# Patient Record
Sex: MALE | Race: WHITE | NOT HISPANIC OR LATINO | Employment: OTHER | URBAN - METROPOLITAN AREA
[De-identification: names, ages, dates, MRNs, and addresses within clinical notes are randomized per-mention and may not be internally consistent; named-entity substitution may affect disease eponyms.]

---

## 2017-12-13 ENCOUNTER — TRANSFERRED RECORDS (OUTPATIENT)
Dept: HEALTH INFORMATION MANAGEMENT | Facility: CLINIC | Age: 62
End: 2017-12-13

## 2017-12-13 LAB
CREAT SERPL-MCNC: 0.86 MG/DL (ref 0.7–1.2)
GFR SERPL CREATININE-BSD FRML MDRD: >60 ML/MIN/1.73M2
POTASSIUM SERPL-SCNC: 4.4 MEQ/L (ref 3.4–5.1)

## 2018-08-17 ENCOUNTER — TRANSFERRED RECORDS (OUTPATIENT)
Dept: HEALTH INFORMATION MANAGEMENT | Facility: CLINIC | Age: 63
End: 2018-08-17

## 2018-09-04 ENCOUNTER — OFFICE VISIT (OUTPATIENT)
Dept: SURGERY | Facility: OTHER | Age: 63
End: 2018-09-04
Attending: SURGERY
Payer: COMMERCIAL

## 2018-09-04 VITALS
BODY MASS INDEX: 34.21 KG/M2 | HEART RATE: 75 BPM | OXYGEN SATURATION: 96 % | SYSTOLIC BLOOD PRESSURE: 140 MMHG | HEIGHT: 67 IN | RESPIRATION RATE: 16 BRPM | DIASTOLIC BLOOD PRESSURE: 86 MMHG | WEIGHT: 218 LBS | TEMPERATURE: 98.5 F

## 2018-09-04 DIAGNOSIS — Z12.11 SPECIAL SCREENING FOR MALIGNANT NEOPLASMS, COLON: Primary | ICD-10-CM

## 2018-09-04 DIAGNOSIS — Z86.0100 HISTORY OF COLONIC POLYPS: ICD-10-CM

## 2018-09-04 DIAGNOSIS — Z01.818 ENCOUNTER FOR PREOPERATIVE EXAMINATION FOR GENERAL SURGICAL PROCEDURE: ICD-10-CM

## 2018-09-04 PROCEDURE — 93000 ELECTROCARDIOGRAM COMPLETE: CPT | Performed by: INTERNAL MEDICINE

## 2018-09-04 PROCEDURE — 99203 OFFICE O/P NEW LOW 30 MIN: CPT | Performed by: SURGERY

## 2018-09-04 RX ORDER — BISACODYL 5 MG/1
TABLET, DELAYED RELEASE ORAL
Qty: 2 TABLET | Refills: 0 | Status: SHIPPED | OUTPATIENT
Start: 2018-09-04 | End: 2019-04-26

## 2018-09-04 RX ORDER — POLYETHYLENE GLYCOL 3350 17 G/17G
POWDER, FOR SOLUTION ORAL
Qty: 1 BOTTLE | Refills: 0 | Status: SHIPPED | OUTPATIENT
Start: 2018-09-04 | End: 2019-04-26

## 2018-09-04 ASSESSMENT — PAIN SCALES - GENERAL: PAINLEVEL: NO PAIN (0)

## 2018-09-04 NOTE — MR AVS SNAPSHOT
After Visit Summary   9/4/2018    Maxx Canales    MRN: [id number]           Patient Information     Date Of Birth          1955        Visit Information        Provider Department      9/4/2018 9:00 AM Mj Parsons MD [facility]        Today's Diagnoses     Special screening for malignant neoplasms, colon    -  1      Care Instructions    Thank you for allowing Dr. Parsons and our surgical team to participate in your care.  If you have a scheduling or an appointment question please contact Sofya [facility] Health Unit Coordinator at her direct line [phone].   For nursing questions call L[facility] at [phone]      You are scheduled for a: colonoscopy  Your procedure date is: 10/4/18    You need a friend or family member available to drive you home AND stay with you for 24 hours after you leave the hospital. You will not be allowed to drive yourself. IF you need to take a taxi or the bus you MUST have a responsible person to ride with you. YOUR PROCEDURE WILL BE CANCELLED IF YOU DO NOT HAVE A RESPONSIBLE ADULT TO DRIVE YOU HOME.              You need to call our Surgery Education Nurses 1-2 weeks prior to your surgery date at  [phone] or toll free [phone]. Please have you medication and allergy lists ready.      Stop your aspirin or other NSAIDs(Ibuprofen, Motrin, Aleve, Celebrex, Naproxen, etc...) 7 days before your surgery.      Hospital admitting will call you the day before your surgery with your arrival time. If you are scheduled on a Monday admitting will call you the Friday before.      Please call your primary care physician if you should become ill within 24 hours of scheduled surgery. (ex.vomiting, diarrhea, fever)    Surgery Education will contact you the day before your procedure between the hours of noon and 5 pm with the time you need to register in admitting at the hospital. Call Ivette with any questions [phone]    Day of surgery hold all  "medications until you arrive home you may resume all medications like normal.            Follow-ups after your visit        Who to contact     If you have questions or need follow up information about today's clinic visit or your schedule please contact [facility] directly at [phone].  Normal or non-critical lab and imaging results will be communicated to you by MyChart, letter or phone within 4 business days after the clinic has received the results. If you do not hear from us within 7 days, please contact the clinic through MyChart or phone. If you have a critical or abnormal lab result, we will notify you by phone as soon as possible.  Submit refill requests through [employer] or call your pharmacy and they will forward the refill request to us. Please allow 3 business days for your refill to be completed.          Additional Information About Your Visit        Care EveryWhere ID     This is your Care EveryWhere ID. This could be used by other organizations to access your [facility] medical records  [id number]        Your Vitals Were     Pulse Temperature Respirations Height Pulse Oximetry BMI (Body Mass Index)    75 98.5  F (36.9  C) (Tympanic) 16 5' 6.5\" (1.689 m) 96% 34.66 kg/m2       Blood Pressure from Last 3 Encounters:   09/04/18 140/86   10/14/15 139/97   10/21/13 103/68    Weight from Last 3 Encounters:   09/04/18 218 lb (98.9 kg)   10/16/13 205 lb (93 kg)   10/04/13 205 lb (93 kg)              Today, you had the following     No orders found for display         Today's Medication Changes          These changes are accurate as of 9/4/18  9:13 AM.  If you have any questions, ask your nurse or doctor.               Stop taking these medicines if you haven't already. Please contact your care team if you have questions.     oxyCODONE-acetaminophen 5-325 MG per tablet   Commonly known as:  PERCOCET   Stopped by:  Mj Parsons MD           vitamin C 500 MG Chew   Stopped by:  Mj Parsons " MD ROMANA                    Primary Care Provider Office Phone # Fax #    Kaila Javier [phone]       [address] [address]        Equal Access to Services     ANDRAALEX CEDRIC : Karen fajardo kaitlin Sojanie, waaxda luqadaha, qaybta kaalmada adewallaceyada, teresita allred laAnambrina devine. So [facility] [phone].    ATENCIÓN: Si habla español, tiene a aiken disposición servicios gratuitos de asistencia lingüística. Llame al [phone].    We comply with applicable federal civil rights laws and [place] laws. We do not discriminate on the basis of race, color, national origin, age, disability, sex, sexual orientation, or gender identity.            Thank you!     Thank you for choosing [facility]  for your care. Our goal is always to provide you with excellent care. Hearing back from our patients is one way we can continue to improve our services. Please take a few minutes to complete the written survey that you may receive in the mail after your visit with us. Thank you!             Your Updated Medication List - Protect others around you: Learn how to safely use, store and throw away your medicines at www.disposemymeds.org.          This list is accurate as of 9/4/18  9:13 AM.  Always use your most recent med list.                   Brand Name Dispense Instructions for use Diagnosis    ASPIRIN PO      Take 81 mg by mouth daily        lisinopril 20 MG tablet    PRINIVIL/ZESTRIL     Take 20 mg by mouth daily        MULTIVITAMIN PO           Vitamin D-3 1000 units Caps           ZOCOR 80 MG tablet   Generic drug:  simvastatin      Take by mouth At Bedtime

## 2018-09-04 NOTE — PROGRESS NOTES
Surgery Consult Clinic Note      RE: Maxx Canales  : 1955  DAYA: 2018      Chief Complaint:  Colon Cancer Screening   History of colon polyps     History of Present Illness:  Maxx Canales is a very pleasant 62 year old year old male who I am seeing at the request of Dr. Javier for evaluation of screening colon malignant neoplasm and consideration for colonoscopy.  He denies family history of colon or rectal cancer, blood in stool, changes in bowel habits, weight loss, abdominal pain.  Surgical hx: sigmoid colectomy.  He specifically denies fever, chills, nausea, vomiting, chest pain, shortness of breath or palpitations.      Medical history:  History reviewed. No pertinent past medical history.    Surgical history:  Past Surgical History:   Procedure Laterality Date     ABDOMEN SURGERY      sigmoid resection, diverticulitis     COLONOSCOPY  10/21/2013    Procedure: COLONOSCOPY;  COLONOSCOPY with biopsy/ polypectomy;  Surgeon: Carisa Brito DO;  Location: [facility] OR     EYE SURGERY         Family history:  Family History   Problem Relation Age of Onset     Diabetes Mother      HEART DISEASE Father        Medications:  Current Outpatient Prescriptions   Medication Sig Dispense Refill     ASPIRIN PO Take 81 mg by mouth daily       Cholecalciferol (VITAMIN D-3) 1000 UNITS CAPS        lisinopril (PRINIVIL,ZESTRIL) 20 MG tablet Take 20 mg by mouth daily       Multiple Vitamins-Minerals (MULTIVITAMIN OR)        simvastatin (ZOCOR) 80 MG tablet Take by mouth At Bedtime       Allergies:  The patientis allergic to atorvastatin.  .  Social history:  Social History   Substance Use Topics     Smoking status: Former Smoker     Types: Cigarettes     Quit date: 1999     Smokeless tobacco: Former User     Types: Chew      Comment: occasional cigar     Alcohol use Yes      Comment: 3x week     Marital status: .      Review of Systems:  10 point review of systems was obtained and negative other  "than what previously mentioned in HPI    Physical Examination:  /86 (BP Location: Right arm, Patient Position: Sitting, Cuff Size: Adult Regular)  Pulse 75  Temp 98.5  F (36.9  C) (Tympanic)  Resp 16  Ht 5' 6.5\" (1.689 m)  Wt 218 lb (98.9 kg)  SpO2 96%  BMI 34.66 kg/m2  General: AAOx4, NAD, WN/WD, ambulating without assistance  HEENT:NCAT, EOMI, PERRL Sclerae anicteric; Trachea mideline,   Chest:  No acute distress, CTAB   Cardiac:  regular rate and rhythm, no murmur   Abdomen: Non-distended   Extremities: Cursory exam unremarkable.  Skin: Warm, dry,   Neuro: no focal deficit,   Psych: happy, calm, asks appropriate questions      Assessment/Plan:  62 y.o. Female with history of sigmoid colectomy for diverticulitis presents for colon cancer screening. He has a history of an adenoma 5 years ago. Satisfactory candidate for colonoscopy.  The indications, risks, benefits and technical aspects of whole colon colonoscopy were outlined with risks including, but not limited to, perforation, bleeding and inability to visualize entire colon.  Management of each was reviewed.  The need of mechanical preparation of the colon was reviewed along with the use of monitored anesthetic care.  The patient's questions were asked and answered.      Mj Parsons      "

## 2018-09-04 NOTE — NURSING NOTE
"Chief Complaint   Patient presents with     Consult     referred by Sonam for colonoscopy, previously 5 years ago with polyps, no changes in boels and no family history of colon cancer       Initial /86 (BP Location: Right arm, Patient Position: Sitting, Cuff Size: Adult Regular)  Pulse 75  Temp 98.5  F (36.9  C) (Tympanic)  Resp 16  Ht 5' 6.5\" (1.689 m)  Wt 218 lb (98.9 kg)  SpO2 96%  BMI 34.66 kg/m2 Estimated body mass index is 34.66 kg/(m^2) as calculated from the following:    Height as of this encounter: 5' 6.5\" (1.689 m).    Weight as of this encounter: 218 lb (98.9 kg).  Medication Reconciliation: complete    Alisosn Hernandez LPN    "

## 2018-09-04 NOTE — PATIENT INSTRUCTIONS
Thank you for allowing Dr. Parsons and our surgical team to participate in your care.  If you have a scheduling or an appointment question please contact Sofya our Health Unit Coordinator at her direct line [phone].   For nursing questions call Loida at [phone]      You are scheduled for a: colonoscopy  Your procedure date is: 10/4/18    You need a friend or family member available to drive you home AND stay with you for 24 hours after you leave the hospital. You will not be allowed to drive yourself. IF you need to take a taxi or the bus you MUST have a responsible person to ride with you. YOUR PROCEDURE WILL BE CANCELLED IF YOU DO NOT HAVE A RESPONSIBLE ADULT TO DRIVE YOU HOME.              You need to call our Surgery Education Nurses 1-2 weeks prior to your surgery date at  [phone] or toll free [phone]. Please have you medication and allergy lists ready.      Stop your aspirin or other NSAIDs(Ibuprofen, Motrin, Aleve, Celebrex, Naproxen, etc...) 7 days before your surgery.      Hospital admitting will call you the day before your surgery with your arrival time. If you are scheduled on a Monday admitting will call you the Friday before.      Please call your primary care physician if you should become ill within 24 hours of scheduled surgery. (ex.vomiting, diarrhea, fever)    Surgery Education will contact you the day before your procedure between the hours of noon and 5 pm with the time you need to register in admitting at the hospital. Call Ivette with any questions [phone]    Day of surgery hold all medications until you arrive home you may resume all medications like normal.

## 2018-10-02 ENCOUNTER — TRANSFERRED RECORDS (OUTPATIENT)
Dept: HEALTH INFORMATION MANAGEMENT | Facility: CLINIC | Age: 63
End: 2018-10-02

## 2018-10-02 LAB
ALT SERPL-CCNC: 24 IU/L (ref 6–40)
CHOLEST SERPL-MCNC: 197 MG/DL (ref 114–200)
CREAT SERPL-MCNC: 0.83 MG/DL (ref 0.7–1.2)
GFR SERPL CREATININE-BSD FRML MDRD: >60 ML/MIN/1.73M2
GLUCOSE SERPL-MCNC: 108 MG/DL (ref 70–99)
HDLC SERPL-MCNC: 44 MG/DL (ref 40–60)
LDLC SERPL CALC-MCNC: 112 MG/DL
POTASSIUM SERPL-SCNC: 4 MEQ/L (ref 3.4–5.1)
TRIGL SERPL-MCNC: 205 MG/DL (ref 10–200)

## 2018-10-03 ENCOUNTER — ANESTHESIA EVENT (OUTPATIENT)
Dept: SURGERY | Facility: HOSPITAL | Age: 63
End: 2018-10-03
Payer: COMMERCIAL

## 2018-10-03 ASSESSMENT — LIFESTYLE VARIABLES: TOBACCO_USE: 1

## 2018-10-03 NOTE — ANESTHESIA PREPROCEDURE EVALUATION
Anesthesia Evaluation     . Pt has had prior anesthetic.     No history of anesthetic complications          ROS/MED HX    ENT/Pulmonary:     (+)PO risk factors obese, tobacco use, Past use quit 1999 packs/day  , . .    Neurologic:  - neg neurologic ROS     Cardiovascular:     (+) Dyslipidemia, ----. : . . . :. .       METS/Exercise Tolerance:     Hematologic:  - neg hematologic  ROS       Musculoskeletal:  - neg musculoskeletal ROS       GI/Hepatic:     (+) bowel prep, Other GI/Hepatic Diverticular Disease s/p Sigmoid Colectomy 1997      Renal/Genitourinary:  - ROS Renal section negative       Endo:     (+) Obesity, .      Psychiatric:  - neg psychiatric ROS       Infectious Disease:  - neg infectious disease ROS       Malignancy:      - no malignancy   Other:    - neg other ROS                 Physical Exam  Normal systems: dental    Airway   Mallampati: III  TM distance: >3 FB  Neck ROM: full    Dental     Cardiovascular   Rhythm and rate: regular and normal      Pulmonary    breath sounds clear to auscultation                    Anesthesia Plan      History & Physical Review  History and physical reviewed and following examination; no interval change.    ASA Status:  3 .    NPO Status:  > 8 hours    Plan for MAC with Propofol and Intravenous induction. Maintenance will be TIVA.  Reason for MAC:  Chronic cardiopulmonary disease (G9) and Other - see comments  PONV prophylaxis:  Ondansetron (or other 5HT-3)  Surgeon requests deep sedation. Patient is an ASA 3. Will provide MAC.      Postoperative Care  Postoperative pain management:  IV analgesics.      Consents  Anesthetic plan, risks, benefits and alternatives discussed with:  Patient..                          .

## 2018-10-04 ENCOUNTER — HOSPITAL ENCOUNTER (OUTPATIENT)
Facility: HOSPITAL | Age: 63
Discharge: HOME | End: 2018-10-04
Attending: SURGERY | Admitting: SURGERY
Payer: COMMERCIAL

## 2018-10-04 ENCOUNTER — SURGERY (OUTPATIENT)
Age: 63
End: 2018-10-04

## 2018-10-04 ENCOUNTER — ANESTHESIA (OUTPATIENT)
Dept: SURGERY | Facility: HOSPITAL | Age: 63
End: 2018-10-04
Payer: COMMERCIAL

## 2018-10-04 VITALS
RESPIRATION RATE: 18 BRPM | HEIGHT: 67 IN | SYSTOLIC BLOOD PRESSURE: 106 MMHG | WEIGHT: 213.3 LBS | DIASTOLIC BLOOD PRESSURE: 66 MMHG | TEMPERATURE: 98.2 F | OXYGEN SATURATION: 95 % | BODY MASS INDEX: 33.48 KG/M2

## 2018-10-04 PROCEDURE — 25000125 ZZHC RX 250: Performed by: NURSE ANESTHETIST, CERTIFIED REGISTERED

## 2018-10-04 PROCEDURE — 71000027 ZZH RECOVERY PHASE 2 EACH 15 MINS: Performed by: SURGERY

## 2018-10-04 PROCEDURE — 37000008 ZZH ANESTHESIA TECHNICAL FEE, 1ST 30 MIN: Performed by: SURGERY

## 2018-10-04 PROCEDURE — 25000128 H RX IP 250 OP 636: Performed by: NURSE ANESTHETIST, CERTIFIED REGISTERED

## 2018-10-04 PROCEDURE — 36000050 ZZH SURGERY LEVEL 2 1ST 30 MIN: Performed by: SURGERY

## 2018-10-04 PROCEDURE — 27210794 ZZH OR GENERAL SUPPLY STERILE: Performed by: SURGERY

## 2018-10-04 PROCEDURE — 25000128 H RX IP 250 OP 636: Performed by: ANESTHESIOLOGY

## 2018-10-04 PROCEDURE — 45385 COLONOSCOPY W/LESION REMOVAL: CPT | Performed by: ANESTHESIOLOGY

## 2018-10-04 PROCEDURE — 88305 TISSUE EXAM BY PATHOLOGIST: CPT | Mod: TC | Performed by: SURGERY

## 2018-10-04 PROCEDURE — 40000306 ZZH STATISTIC PRE PROC ASSESS II: Performed by: SURGERY

## 2018-10-04 PROCEDURE — 45380 COLONOSCOPY AND BIOPSY: CPT | Mod: PT | Performed by: SURGERY

## 2018-10-04 PROCEDURE — 01999 UNLISTED ANES PROCEDURE: CPT | Performed by: NURSE ANESTHETIST, CERTIFIED REGISTERED

## 2018-10-04 RX ORDER — FENTANYL CITRATE 50 UG/ML
25-50 INJECTION, SOLUTION INTRAMUSCULAR; INTRAVENOUS
Status: DISCONTINUED | OUTPATIENT
Start: 2018-10-04 | End: 2018-10-04 | Stop reason: HOSPADM

## 2018-10-04 RX ORDER — SODIUM CHLORIDE, SODIUM LACTATE, POTASSIUM CHLORIDE, CALCIUM CHLORIDE 600; 310; 30; 20 MG/100ML; MG/100ML; MG/100ML; MG/100ML
INJECTION, SOLUTION INTRAVENOUS CONTINUOUS
Status: DISCONTINUED | OUTPATIENT
Start: 2018-10-04 | End: 2018-10-04 | Stop reason: HOSPADM

## 2018-10-04 RX ORDER — PROPOFOL 10 MG/ML
INJECTION, EMULSION INTRAVENOUS PRN
Status: DISCONTINUED | OUTPATIENT
Start: 2018-10-04 | End: 2018-10-04

## 2018-10-04 RX ORDER — LIDOCAINE HYDROCHLORIDE 20 MG/ML
INJECTION, SOLUTION INFILTRATION; PERINEURAL PRN
Status: DISCONTINUED | OUTPATIENT
Start: 2018-10-04 | End: 2018-10-04

## 2018-10-04 RX ORDER — ONDANSETRON 4 MG/1
4 TABLET, ORALLY DISINTEGRATING ORAL EVERY 30 MIN PRN
Status: DISCONTINUED | OUTPATIENT
Start: 2018-10-04 | End: 2018-10-04 | Stop reason: HOSPADM

## 2018-10-04 RX ORDER — NALOXONE HYDROCHLORIDE 0.4 MG/ML
.1-.4 INJECTION, SOLUTION INTRAMUSCULAR; INTRAVENOUS; SUBCUTANEOUS
Status: DISCONTINUED | OUTPATIENT
Start: 2018-10-04 | End: 2018-10-04 | Stop reason: HOSPADM

## 2018-10-04 RX ORDER — MEPERIDINE HYDROCHLORIDE 50 MG/ML
12.5 INJECTION INTRAMUSCULAR; INTRAVENOUS; SUBCUTANEOUS
Status: DISCONTINUED | OUTPATIENT
Start: 2018-10-04 | End: 2018-10-04 | Stop reason: HOSPADM

## 2018-10-04 RX ORDER — ONDANSETRON 2 MG/ML
4 INJECTION INTRAMUSCULAR; INTRAVENOUS EVERY 30 MIN PRN
Status: DISCONTINUED | OUTPATIENT
Start: 2018-10-04 | End: 2018-10-04 | Stop reason: HOSPADM

## 2018-10-04 RX ORDER — HYDRALAZINE HYDROCHLORIDE 20 MG/ML
2.5-5 INJECTION INTRAMUSCULAR; INTRAVENOUS EVERY 10 MIN PRN
Status: DISCONTINUED | OUTPATIENT
Start: 2018-10-04 | End: 2018-10-04 | Stop reason: HOSPADM

## 2018-10-04 RX ORDER — DEXAMETHASONE SODIUM PHOSPHATE 4 MG/ML
4 INJECTION, SOLUTION INTRA-ARTICULAR; INTRALESIONAL; INTRAMUSCULAR; INTRAVENOUS; SOFT TISSUE EVERY 10 MIN PRN
Status: DISCONTINUED | OUTPATIENT
Start: 2018-10-04 | End: 2018-10-04 | Stop reason: HOSPADM

## 2018-10-04 RX ORDER — ALBUTEROL SULFATE 0.83 MG/ML
2.5 SOLUTION RESPIRATORY (INHALATION) EVERY 4 HOURS PRN
Status: DISCONTINUED | OUTPATIENT
Start: 2018-10-04 | End: 2018-10-04 | Stop reason: HOSPADM

## 2018-10-04 RX ORDER — LIDOCAINE 40 MG/G
CREAM TOPICAL
Status: DISCONTINUED | OUTPATIENT
Start: 2018-10-04 | End: 2018-10-04 | Stop reason: HOSPADM

## 2018-10-04 RX ORDER — HYDROMORPHONE HYDROCHLORIDE 1 MG/ML
.3-.5 INJECTION, SOLUTION INTRAMUSCULAR; INTRAVENOUS; SUBCUTANEOUS EVERY 10 MIN PRN
Status: DISCONTINUED | OUTPATIENT
Start: 2018-10-04 | End: 2018-10-04 | Stop reason: HOSPADM

## 2018-10-04 RX ORDER — FLUMAZENIL 0.1 MG/ML
0.2 INJECTION, SOLUTION INTRAVENOUS
Status: DISCONTINUED | OUTPATIENT
Start: 2018-10-04 | End: 2018-10-04 | Stop reason: HOSPADM

## 2018-10-04 RX ORDER — KETOROLAC TROMETHAMINE 30 MG/ML
30 INJECTION, SOLUTION INTRAMUSCULAR; INTRAVENOUS EVERY 6 HOURS PRN
Status: DISCONTINUED | OUTPATIENT
Start: 2018-10-04 | End: 2018-10-04 | Stop reason: HOSPADM

## 2018-10-04 RX ADMIN — PROPOFOL 50 MG: 10 INJECTION, EMULSION INTRAVENOUS at 11:08

## 2018-10-04 RX ADMIN — PROPOFOL 50 MG: 10 INJECTION, EMULSION INTRAVENOUS at 11:01

## 2018-10-04 RX ADMIN — PROPOFOL 20 MG: 10 INJECTION, EMULSION INTRAVENOUS at 11:14

## 2018-10-04 RX ADMIN — PROPOFOL 20 MG: 10 INJECTION, EMULSION INTRAVENOUS at 11:13

## 2018-10-04 RX ADMIN — LIDOCAINE HYDROCHLORIDE 40 MG: 20 INJECTION, SOLUTION INFILTRATION; PERINEURAL at 10:59

## 2018-10-04 RX ADMIN — PROPOFOL 20 MG: 10 INJECTION, EMULSION INTRAVENOUS at 11:11

## 2018-10-04 RX ADMIN — PROPOFOL 50 MG: 10 INJECTION, EMULSION INTRAVENOUS at 10:59

## 2018-10-04 RX ADMIN — SODIUM CHLORIDE, POTASSIUM CHLORIDE, SODIUM LACTATE AND CALCIUM CHLORIDE: 600; 310; 30; 20 INJECTION, SOLUTION INTRAVENOUS at 10:50

## 2018-10-04 RX ADMIN — PROPOFOL 50 MG: 10 INJECTION, EMULSION INTRAVENOUS at 11:04

## 2018-10-04 RX ADMIN — PROPOFOL 20 MG: 10 INJECTION, EMULSION INTRAVENOUS at 11:16

## 2018-10-04 NOTE — IP AVS SNAPSHOT
[facility] Preop/Phase II    [address] [address]    Phone:  [phone]                                       After Visit Summary   10/4/2018    Maxx Canales    MRN: [id number]           After Visit Summary Signature Page     I have received my discharge instructions, and my questions have been answered. I have discussed any challenges I see with this plan with the nurse or doctor.    ..........................................................................................................................................  Patient/Patient Representative Signature      ..........................................................................................................................................  Patient Representative Print Name and Relationship to Patient    ..................................................               ................................................  Date                                   Time    ..........................................................................................................................................  Reviewed by Signature/Title    ...................................................              ..............................................  Date                                               Time          22EPIC Rev 08/18

## 2018-10-04 NOTE — ANESTHESIA POSTPROCEDURE EVALUATION
Patient: Maxx Canales    Procedure(s):  COLONOSCOPY WITH POLYPECTOMY - Wound Class: IV-Dirty or Infected    Diagnosis:HX OF POLYPS  Diagnosis Additional Information: No value filed.    Anesthesia Type:  MAC    Note:  Anesthesia Post Evaluation    Patient location during evaluation: Phase 2 and Bedside  Patient participation: Able to fully participate in evaluation  Level of consciousness: awake and alert  Pain management: adequate  Airway patency: patent  Cardiovascular status: acceptable  Respiratory status: acceptable  Hydration status: stable  PONV: none     Anesthetic complications: None          Last vitals:  Vitals:    10/04/18 1135 10/04/18 1140 10/04/18 1145   BP:  108/68 106/66   Resp:  16 18   Temp:   98.2  F (36.8  C)   SpO2: 94% 94% 95%         Electronically Signed By: Michael Seay MD  October 4, 2018  1:32 PM

## 2018-10-04 NOTE — OR NURSING
Pt eating, drinking, and tolerating well. Passing gas. Denies pain. DIscharge instructions discussed with Pt and spouse. Requesting to leave, and doesn't want to wait to talk to surgeon. States he will wait for the phone call with the results. IV removed. Dressed independently, and escorted to SDS exit via ambulatory.

## 2018-10-04 NOTE — ANESTHESIA CARE TRANSFER NOTE
Patient: Maxx Canales    Procedure(s):  COLONOSCOPY WITH POLYPECTOMY - Wound Class: IV-Dirty or Infected    Diagnosis: HX OF POLYPS  Diagnosis Additional Information: No value filed.    Anesthesia Type:   MAC     Note:  Airway :Room Air  Patient transferred to:Phase II  Handoff Report: Identifed the Patient, Identified the Reponsible Provider, Reviewed the pertinent medical history, Discussed the surgical course, Reviewed Intra-OP anesthesia mangement and issues during anesthesia, Set expectations for post-procedure period and Allowed opportunity for questions and acknowledgement of understanding      Vitals: (Last set prior to Anesthesia Care Transfer)    CRNA VITALS  10/4/2018 1050 - 10/4/2018 1123      10/4/2018             Pulse: 78    Ht Rate: 78    SpO2: 98 %    Resp Rate (set): 8                Electronically Signed By: SEPIDEH Osman CRNA  October 4, 2018  11:23 AM

## 2018-10-04 NOTE — BRIEF OP NOTE
[facility] - Brief Operative Note                Pre-operative diagnosis:                     HX OF POLYPS   Post-operative diagnosis * No post-op diagnosis entered *   Procedure: Procedure(s):  COLONOSCOPY WITH POLYPECTOMY - Wound Class: IV-Dirty or Infected   Surgeon: Mj Parsons MD,    Anesthesia: Monitor Anesthesia Care    Estimated blood loss: * No values recorded between 10/4/2018 11:01 AM and 10/4/2018 11:21 AM *   Blood transfusion: No transfusion was given during surgery   Drains: None    Specimens:   ID Type Source Tests Collected by Time Destination   A : ASCENDING COLON POLYP x 2 Polyp Large Intestine, Right/Ascending SURGICAL PATHOLOGY EXAM Mj Parsons MD 10/4/2018 11:07 AM    B : rectum polyp x 2 Polyp Large Intestine, Rectum SURGICAL PATHOLOGY EXAM Mj Parsons MD 10/4/2018 11:14 AM       Findings: Sigmoid colectomy, ascending colon polyps x2, rectal polyps x 2    Complications: None   Condition: Stable   Comments: Details included in dictated operative note.

## 2018-10-04 NOTE — DISCHARGE INSTRUCTIONS
INSTRUCTIONS AFTER COLONOSCOPY    WHEN YOU ARE BACK HOME:    Plan to rest for an hour or two after you get home.    You may have some cramping or pressure until you pass gas.    You may resume your regular medications.    Eat a small, light meal at first, and then gradually return to normal meal sizes.  If you had a polyp removed:    Slight bleeding may occur.  You may have a slight blood stain on the toilet paper after a bowel movement.    To lessen the chance of bleeding, avoid heavy exercise for ONE WEEK.  This includes heavy lifting, vigorous sport activities, and heavy physical labor.  You may resume your normal sexual activity.      Avoid aspirin or aspirin products if instructed by your doctor.    WHAT TO WATCH FOR:  Problems rarely occur after the exam; however, it is important for you to watch for early signs of possible problems.  If you have     Unusual pain in your abdomen    Nausea and vomiting that persists    Excessive bleeding    Black or bloody bowel movements    Fever or temperature above 100.6 F  Please call your doctor ([facility] [phone]) or go to the nearest hospital emergency room.    Post-Anesthesia Patient Instructions    IMMEDIATELY FOLLOWING SURGERY:  Do not drive or operate machinery for the first twenty four hours after surgery.  Do not make any important decisions for twenty four hours after surgery or while taking narcotic pain medications or sedatives.  If you develop intractable nausea and vomiting or a severe headache please notify your doctor immediately.    FOLLOW-UP:  Please make an appointment with your surgeon as instructed. You do not need to follow up with anesthesia unless specifically instructed to do so.    WOUND CARE INSTRUCTIONS (if applicable):  Keep a dry clean dressing on the anesthesia/puncture wound site if there is drainage.  Once the wound has quit draining you may leave it open to air.  Generally you should leave the bandage intact for twenty four  hours unless there is drainage.  If the epidural site drains for more than 36-48 hours please call the anesthesia department.    QUESTIONS?:  Please feel free to call your physician or the hospital  if you have any questions, and they will be happy to assist you.

## 2018-10-04 NOTE — IP AVS SNAPSHOT
MRN:[id number]                      After Visit Summary   10/4/2018    Maxx Canales    MRN: [id number]           Thank you!     Thank you for choosing [facility] for your care. Our goal is always to provide you with excellent care. Hearing back from our patients is one way we can continue to improve our services. Please take a few minutes to complete the written survey that you may receive in the mail after you visit with us. Thank you!        Patient Information     Date Of Birth          1955        About your hospital stay     You were admitted on:  October 4, 2018 You last received care in the:  [facility]    You were discharged on:  October 4, 2018       Who to Call     For medical emergencies, please call 911.  For non-urgent questions about your medical care, please call your primary care provider or clinic, [phone]  For questions related to your surgery, please call your surgery clinic        Attending Provider     Provider Mj Steele MD General Surgery       Primary Care Provider Office Phone # Fax #    Kaila Javier [phone]      After Care Instructions     Discharge Instructions       Resume pre procedure diet            Discharge Instructions       Restart home medications.                  Further instructions from your care team           INSTRUCTIONS AFTER COLONOSCOPY    WHEN YOU ARE BACK HOME:    Plan to rest for an hour or two after you get home.    You may have some cramping or pressure until you pass gas.    You may resume your regular medications.    Eat a small, light meal at first, and then gradually return to normal meal sizes.  If you had a polyp removed:    Slight bleeding may occur.  You may have a slight blood stain on the toilet paper after a bowel movement.    To lessen the chance of bleeding, avoid heavy exercise for ONE WEEK.  This includes heavy lifting, vigorous sport activities, and heavy physical  labor.  You may resume your normal sexual activity.      Avoid aspirin or aspirin products if instructed by your doctor.    WHAT TO WATCH FOR:  Problems rarely occur after the exam; however, it is important for you to watch for early signs of possible problems.  If you have     Unusual pain in your abdomen    Nausea and vomiting that persists    Excessive bleeding    Black or bloody bowel movements    Fever or temperature above 100.6 F  Please call your doctor ([facility] [phone]) or go to the nearest hospital emergency room.    Post-Anesthesia Patient Instructions    IMMEDIATELY FOLLOWING SURGERY:  Do not drive or operate machinery for the first twenty four hours after surgery.  Do not make any important decisions for twenty four hours after surgery or while taking narcotic pain medications or sedatives.  If you develop intractable nausea and vomiting or a severe headache please notify your doctor immediately.    FOLLOW-UP:  Please make an appointment with your surgeon as instructed. You do not need to follow up with anesthesia unless specifically instructed to do so.    WOUND CARE INSTRUCTIONS (if applicable):  Keep a dry clean dressing on the anesthesia/puncture wound site if there is drainage.  Once the wound has quit draining you may leave it open to air.  Generally you should leave the bandage intact for twenty four hours unless there is drainage.  If the epidural site drains for more than 36-48 hours please call the anesthesia department.    QUESTIONS?:  Please feel free to call your physician or the hospital  if you have any questions, and they will be happy to assist you.       Pending Results     No orders found from 10/2/2018 to 10/5/2018.            Admission Information     Date & Time Provider Department Dept. Phone    10/4/2018 Mj Parsons MD [facility] Preop/Phase [phone]      Your Vitals Were     Blood Pressure Temperature Respirations Height Weight Pulse Oximetry    91/55 98.1  " F (36.7  C) (Oral) 16 1.689 m (5' 6.5\") 96.8 kg (213 lb 4.8 oz) 93%    BMI (Body Mass Index)                   33.91 kg/m2           Care EveryWhere ID     This is your Care EveryWhere ID. This could be used by other organizations to access your [facility] medical records  [id number]        Equal Access to Services     RADHA STEELE AH: Hadii fouzia ku hadasho Soomaali, waaxda luqadaha, qaybta kaalmada adeegyada, waxay idiin haywilliamjac colemanvenanciojaison devine. So [facility] [phone].    ATENCIÓN: Si habla español, tiene a aiken disposición servicios gratuitos de asistencia lingüística. Llame al [phone].    We comply with applicable federal civil rights laws and [place] laws. We do not discriminate on the basis of race, color, national origin, age, disability, sex, sexual orientation, or gender identity.               Review of your medicines      CONTINUE these medicines which have NOT CHANGED        Dose / Directions    ASPIRIN PO        Dose:  81 mg   Take 81 mg by mouth daily   Refills:  0       bisacodyl 5 MG EC tablet   Commonly known as:  DULCOLAX   Used for:  Special screening for malignant neoplasms, colon        Take 2 tablets at noon day before procedure   Quantity:  2 tablet   Refills:  0       lisinopril 20 MG tablet   Commonly known as:  PRINIVIL/ZESTRIL        Dose:  20 mg   Take 20 mg by mouth daily   Refills:  0       magnesium citrate solution   Used for:  Special screening for malignant neoplasms, colon        At 4 am day of procedure drink entire bottle   Quantity:  296 mL   Refills:  0       MULTIVITAMIN PO        Refills:  0       polyethylene glycol powder   Commonly known as:  MIRALAX   Used for:  Special screening for malignant neoplasms, colon        Mix 8.3 ounces with 64 ounces of gatorade at 6 pm the day before your procedure and drink 8 ounces of 10 min   Quantity:  1 Bottle   Refills:  0       Vitamin D-3 1000 units Caps        Refills:  0       ZOCOR 80 MG tablet   Generic drug:  simvastatin "        Take by mouth At Bedtime   Refills:  0                Protect others around you: Learn how to safely use, store and throw away your medicines at www.disposemymeds.org.             Medication List: This is a list of all your medications and when to take them. Check marks below indicate your daily home schedule. Keep this list as a reference.      Medications           Morning Afternoon Evening Bedtime As Needed    ASPIRIN PO   Take 81 mg by mouth daily                                bisacodyl 5 MG EC tablet   Commonly known as:  DULCOLAX   Take 2 tablets at noon day before procedure                                lisinopril 20 MG tablet   Commonly known as:  PRINIVIL/ZESTRIL   Take 20 mg by mouth daily                                magnesium citrate solution   At 4 am day of procedure drink entire bottle                                MULTIVITAMIN PO                                polyethylene glycol powder   Commonly known as:  MIRALAX   Mix 8.3 ounces with 64 ounces of gatorade at 6 pm the day before your procedure and drink 8 ounces of 10 min                                Vitamin D-3 1000 units Caps                                ZOCOR 80 MG tablet   Take by mouth At Bedtime   Generic drug:  simvastatin

## 2018-10-05 LAB — COPATH REPORT: NORMAL

## 2018-10-05 NOTE — OP NOTE
Maxx Canales MRN# [id number]   YOB: 1955 Age: 62 year old      Date of Admission:  10/4/2018  Date of Service:   10/04/18    Primary care provider: Kaila Javier    PREOPERATIVE DIAGNOSIS:  Colon Cancer screening        POSTOPERATIVE DIAGNOSIS:  Colon polyps x 4, sigmoid colectomy          PROCEDURE:  Colonoscopy polypectomy x 4            INDICATIONS:  Screening colonoscopy.      Specimen:   ID Type Source Tests Collected by Time Destination   A : ASCENDING COLON POLYP x 2 Polyp Large Intestine, Right/Ascending SURGICAL PATHOLOGY EXAM Mj Parsons MD 10/4/2018 11:07 AM    B : rectum polyp x 2 Polyp Large Intestine, Rectum SURGICAL PATHOLOGY EXAM Mj Parsons MD 10/4/2018 11:14 AM        SURGEON: Mj Parsons    DESCRIPTION OF PROCEDURE: Maxx Canales was brought into the endoscopy suite and placed in the left lateral decubitus position. After preprocedural pause and attended monitored anesthesia was administered, the external anus was inspected and was normal. Digital rectal exam was normal. The colonoscope was inserted and advanced under direct visualization to the level of the cecum which was identified by the appendiceal orifice and the ileocecal valve. The prep was excellent.. Upon slow withdrawal of the colonoscope, approximately 95% of the mucosa was directly visualized. The sigmoid colon was noted to be absent. There were 2 small polyps in the rectum and 2 in the ascending colon that were removed with biopsy forceps. The rest of the colon was without mucosal abnormality. There was no evidence of further polyps, inflammation, bleeding or AVMs. Retroflexion of the rectum was normal. The extra air was removed from the colon, and the colonoscope withdrawn. The patient tolerated the procedure well and was taken to postanesthesia care unit.     We invite the patient to return in 3-5 years for follow up screening evaluation, pending pathology related to polyps removed  .    Mj Parsons

## 2019-04-26 ENCOUNTER — OFFICE VISIT (OUTPATIENT)
Dept: FAMILY MEDICINE | Facility: OTHER | Age: 64
End: 2019-04-26
Attending: PHYSICIAN ASSISTANT
Payer: COMMERCIAL

## 2019-04-26 VITALS
TEMPERATURE: 97.6 F | HEIGHT: 67 IN | SYSTOLIC BLOOD PRESSURE: 134 MMHG | WEIGHT: 220 LBS | OXYGEN SATURATION: 96 % | DIASTOLIC BLOOD PRESSURE: 70 MMHG | HEART RATE: 74 BPM | BODY MASS INDEX: 34.53 KG/M2
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
## 2019-04-26 DIAGNOSIS — S30.861A TICK BITE OF ABDOMEN, INITIAL ENCOUNTER: Primary | ICD-10-CM

## 2019-04-26 PROCEDURE — 99202 OFFICE O/P NEW SF 15 MIN: CPT | Performed by: PHYSICIAN ASSISTANT

## 2019-04-26 RX ORDER — DOXYCYCLINE HYCLATE 100 MG
TABLET ORAL
Qty: 2 TABLET | Refills: 0 | Status: SHIPPED | OUTPATIENT
Start: 2019-04-26 | End: 2019-07-29

## 2019-04-26 ASSESSMENT — PAIN SCALES - GENERAL: PAINLEVEL: NO PAIN (0)

## 2019-04-26 ASSESSMENT — MIFFLIN-ST. JEOR: SCORE: 1748.6

## 2019-04-26 NOTE — PROGRESS NOTES
SUBJECTIVE:   Maxx Canales is a 63 year old male who presents to clinic today for the following   health issues:          Tick bite      Duration: 1 day    Description (location/character/radiation): red [place] with scab in middle    Intensity:  mild    Accompanying signs and symptoms: None    History (similar episodes/previous evaluation): None    Precipitating or alleviating factors: None    Therapies tried and outcome: None           Additional history: as documented    Reviewed  and updated as needed this visit by clinical staff         Reviewed and updated as needed this visit by Provider         Patient Active Problem List   Diagnosis     Diverticulitis of colon     Past Surgical History:   Procedure Laterality Date     ABDOMEN SURGERY      sigmoid resection, diverticulitis     COLONOSCOPY  10/21/2013    Procedure: COLONOSCOPY;  COLONOSCOPY with biopsy/ polypectomy;  Surgeon: Carisa Brito DO;  Location: [facility]     COLONOSCOPY N/A 10/4/2018    Procedure: COLONOSCOPY;  COLONOSCOPY WITH POLYPECTOMY;  Surgeon: Mj Parsons MD;  Location: [facility]     EYE SURGERY         Social History     Tobacco Use     Smoking status: Former Smoker     Types: Cigarettes     Last attempt to quit: 1999     Years since quittin.3     Smokeless tobacco: Former User     Types: Chew     Tobacco comment: occasional cigar   Substance Use Topics     Alcohol use: Yes     Comment: 3x week     Family History   Problem Relation Age of Onset     Diabetes Mother      Heart Disease Father          Current Outpatient Medications   Medication Sig Dispense Refill     ASPIRIN PO Take 81 mg by mouth daily       Cholecalciferol (VITAMIN D-3) 1000 UNITS CAPS        lisinopril (PRINIVIL,ZESTRIL) 20 MG tablet Take 20 mg by mouth daily       Multiple Vitamins-Minerals (MULTIVITAMIN OR)        simvastatin (ZOCOR) 80 MG tablet Take by mouth At Bedtime       Allergies   Allergen Reactions     Atorvastatin      Recent Labs   Lab Test  "10/02/18 12/13/17 10/14/15  1125     --   --    HDL 44  --   --    TRIG 205*  --   --    ALT 24  --  41   CR 0.83 0.86 0.81   GFRESTIMATED >60 >60 >90  Non  GFR Calc     GFRESTBLACK  --   --  >90  African American GFR Calc     POTASSIUM 4.0 4.4 4.2      BP Readings from Last 3 Encounters:   04/26/19 134/70   10/04/18 106/66   09/04/18 140/86    Wt Readings from Last 3 Encounters:   04/26/19 99.8 kg (220 lb)   10/04/18 96.8 kg (213 lb 4.8 oz)   09/04/18 98.9 kg (218 lb)                    ROS:  Constitutional, neuro, ENT, endocrine, pulmonary, cardiac, gastrointestinal, genitourinary, musculoskeletal, integument and psychiatric systems are negative, except as otherwise noted.    OBJECTIVE:                                                    /70 (BP Location: Left arm, Patient Position: Sitting, Cuff Size: Adult Large)   Pulse 74   Temp 97.6  F (36.4  C) (Tympanic)   Ht 1.689 m (5' 6.5\")   Wt 99.8 kg (220 lb)   SpO2 96%   BMI 34.98 kg/m    Body mass index is 34.98 kg/m .  GENERAL APPEARANCE: healthy, alert and no distress  SKIN: large bit on right posterior trunk by his belt line.   NEURO: Normal strength and tone, mentation intact and speech normal  PSYCH: mentation appears normal and affect normal/bright    Diagnostic test results:  Diagnostic Test Results:  No results found for this or any previous visit (from the past 24 hour(s)).     ASSESSMENT/PLAN:                                                        1. Tick bite of abdomen, initial encounter  Has been on less than 72 hours.  This does appear to be a deer tick.    - doxycycline hyclate (VIBRA-TABS) 100 MG tablet; Take 2 now.  Dispense: 2 tablet; Refill: 0    See Patient Instructions    Aviva Rai PA-C  [facility] - [place]        "

## 2019-04-26 NOTE — NURSING NOTE
"Chief Complaint   Patient presents with     Insect Bites       Initial /70 (BP Location: Left arm, Patient Position: Sitting, Cuff Size: Adult Large)   Pulse 74   Temp 97.6  F (36.4  C) (Tympanic)   Ht 1.689 m (5' 6.5\")   Wt 99.8 kg (220 lb)   SpO2 96%   BMI 34.98 kg/m   Estimated body mass index is 34.98 kg/m  as calculated from the following:    Height as of this encounter: 1.689 m (5' 6.5\").    Weight as of this encounter: 99.8 kg (220 lb).  Medication Reconciliation: complete    Felipa Jasmine LPN  "

## 2019-04-26 NOTE — PATIENT INSTRUCTIONS
Patient Education     Tick Bite, Antibiotic Treatment    Ticks are small arachnids that feed on the blood of rodents, rabbits, birds, deer, dogs and humans. The bite may cause a local reaction like that of a spider, with a small amount of local redness, itching and slight swelling. Sometimes there is no local reaction.  Most tick bites are harmless. But some ticks carry diseases, such as Lyme disease or [place] spotted fever. These can be passed to people at the time of the bite. Lyme disease is of greatest concern. Right now you have no symptoms of Lyme disease or other serious reaction to the bite. It is important to watch for the warning signs, which could appear weeks to months after the tick bite.  Home care  The following guidelines can help you care for your bite at home:    If itching is a problem, avoid tight clothing and anything that heats up your skin. This includes hot showers or baths and direct sunlight. This often makes the itching worse.    An ice pack will reduce local areas of redness and itching. Make your own ice pack by putting ice cubes in a zip-top plastic bag and wrapping it in a thin towel. Over-the-counter creams containing benzocaine may help with itching.    You can use an antihistamine with diphenhydramine if your doctor did not give you another antihistamine. This medicine may be used to reduce itching if large areas of the skin are involved. It is available at drugstores and grocery stores. If symptoms continue, talk with your doctor or pharmacist about other over-the counter medicines that may be helpful.    Your doctor may prescribe antibiotics to reduce your risk of getting Lyme disease. It is very important that you take them exactly as directed until they are completely finished.  Follow-up care  Follow up with your healthcare provider, or as advised.  Call 911  Call 911 if any of these occur:    Irregular or rapid heartbeat    Numbness, tingling, or weakness in the arms  or legs  When to seek medical advice  Call your healthcare provider right away if any of these occur:  Signs of local infection. Watch for these during the next few days:    Increasing redness around the bite site    Increased pain or swelling    Fever over 100.4 F (38 C), or as directed by your healthcare provider    Fluid draining from the bite area  Signs of tick-related disease. Watch for these over the next few weeks to months:    Circular, red, ring-like rash appears at the bite area within 1 to 3 weeks    Tiredness, fever or chills, nausea or vomiting    Neck pain or stiffness, headache, or confusion    Muscle or bone aches    Joint pain or swelling, especially in the knee    Weakness on one side of the face  Date Last Reviewed: 10/1/2016    4008-9788 [employer]. [address] [place] [address], [place] [address]. All rights reserved. This information is not intended as a substitute for professional medical care. Always follow your healthcare professional's instructions.

## 2019-07-17 NOTE — PROGRESS NOTES
Subjective     Maxx Canales is a 63 year old male who presents to clinic today for the following health issues:    HPI   New Patient/Transfer of Care    Patient Active Problem List   Diagnosis     Diverticulitis of colon     Class 1 obesity with serious comorbidity and body mass index (BMI) of 33.0 to 33.9 in adult, unspecified obesity type     Dysmetabolic syndrome X     Erectile dysfunction, unspecified erectile dysfunction type     Essential hypertension     Partially resolved traumatic cataract of right eye     Primary localized osteoarthrosis, lower leg     Pure hypercholesterolemia     Thalassemia     Past Surgical History:   Procedure Laterality Date     ABDOMEN SURGERY      sigmoid resection, diverticulitis     COLONOSCOPY  10/21/2013    Procedure: COLONOSCOPY;  COLONOSCOPY with biopsy/ polypectomy;  Surgeon: Carisa Brito DO;  [facility]: [facility]     COLONOSCOPY N/A 10/4/2018    Procedure: COLONOSCOPY;  COLONOSCOPY WITH POLYPECTOMY;  Surgeon: Mj Parsons MD;  Location: [facility]     EYE SURGERY  2018    right     IR RENAL STONE REMOVAL RIGHT  2018    8 cm     VASECTOMY         Social History     Tobacco Use     Smoking status: Former Smoker     Types: Cigarettes     Last attempt to quit: 1999     Years since quittin.5     Smokeless tobacco: Former User     Types: Chew     Tobacco comment: occasional cigar   Substance Use Topics     Alcohol use: Yes     Frequency: 4 or more times a week     Drinks per session: 1 or 2     Comment: 3x week     Family History   Problem Relation Age of Onset     Diabetes Mother      Heart Disease Father          Current Outpatient Medications   Medication Sig Dispense Refill     ASPIRIN PO Take 81 mg by mouth daily       Cholecalciferol (VITAMIN D-3) 1000 UNITS CAPS        lisinopril (PRINIVIL,ZESTRIL) 20 MG tablet Take 40 mg by mouth daily        Multiple Vitamins-Minerals (MULTIVITAMIN OR)        simvastatin (ZOCOR) 80 MG tablet Take by mouth At Bedtime    "    Allergies   Allergen Reactions     Atorvastatin      BP Readings from Last 3 Encounters:   07/29/19 136/78   04/26/19 134/70   10/04/18 106/66    Wt Readings from Last 3 Encounters:   07/29/19 92.1 kg (203 lb)   04/26/19 99.8 kg (220 lb)   10/04/18 96.8 kg (213 lb 4.8 oz)                      Reviewed and updated as needed this visit by Provider  Tobacco  Allergies  Meds  Problems  Med Hx  Surg Hx  Soc Hx        Review of Systems   ROS COMP: CONSTITUTIONAL: NEGATIVE for fever, chills, change in weight  INTEGUMENTARY/SKIN: NEGATIVE for worrisome rashes, moles or lesions  EYES: NEGATIVE for vision changes or irritation  ENT/MOUTH: NEGATIVE for ear, mouth and throat problems  RESP: NEGATIVE for significant cough or SOB  BREAST: NEGATIVE for masses, tenderness or discharge  CV: NEGATIVE for chest pain, palpitations or peripheral edema  GI: NEGATIVE for nausea, abdominal pain, heartburn, or change in bowel habits  : NEGATIVE for frequency, dysuria, or hematuria  MUSCULOSKELETAL: NEGATIVE for significant arthralgias or myalgia  NEURO: NEGATIVE for weakness, dizziness or paresthesias  ENDOCRINE: NEGATIVE for temperature intolerance, skin/hair changes  HEME: NEGATIVE for bleeding problems  PSYCHIATRIC: NEGATIVE for changes in mood or affect      Objective    /78   Pulse 77   Resp 20   Ht 1.689 m (5' 6.5\")   Wt 92.1 kg (203 lb)   SpO2 98%   BMI 32.27 kg/m     Body mass index is 32.27 kg/m .  Physical Exam   GENERAL: healthy, alert and no distress  EYES: Eyes grossly normal to inspection, PERRL and conjunctivae and sclerae normal  HENT: ear canals and TM's normal, nose and mouth without ulcers or lesions  NECK: no adenopathy, no asymmetry, masses, or scars and thyroid normal to palpation  RESP: lungs clear to auscultation - no rales, rhonchi or wheezes  CV: regular rate and rhythm, normal S1 S2, no S3 or S4, no murmur, click or rub, no peripheral edema and peripheral pulses strong  ABDOMEN: soft, " "nontender, no hepatosplenomegaly, no masses and bowel sounds normal  MS: no gross musculoskeletal defects noted, no edema  SKIN: no suspicious lesions or rashes  NEURO: Normal strength and tone, mentation intact and speech normal  PSYCH: mentation appears normal, affect normal/bright            Assessment & Plan     1. Encounter to establish care  PMH, PSH, SH, FH, medications, Allergies renviewed    2. Benign essential hypertension  He will follow up in October for his PE. Lab orders done  - CBC with platelets; Future  - Comprehensive metabolic panel; Future    3. Thalassemia, unspecified type      4. Hyperlipidemia LDL goal <100    - Lipid Profile; Future    5. Screening for prostate cancer  He had had this done annually, no FH history of prostate cancer  - PSA, screen; Future    6. Elevated glucose    - Hemoglobin A1c; Future     BMI:   Estimated body mass index is 32.27 kg/m  as calculated from the following:    Height as of this encounter: 1.689 m (5' 6.5\").    Weight as of this encounter: 92.1 kg (203 lb).   Weight management plan: Discussed healthy diet and exercise guidelines            Return in about 2 months (around 9/29/2019) for Physical Exam.    Alison Duffy MD  [facility] - [place]      "

## 2019-07-29 ENCOUNTER — OFFICE VISIT (OUTPATIENT)
Dept: FAMILY MEDICINE | Facility: OTHER | Age: 64
End: 2019-07-29
Attending: FAMILY MEDICINE
Payer: COMMERCIAL

## 2019-07-29 VITALS
HEART RATE: 77 BPM | OXYGEN SATURATION: 98 % | RESPIRATION RATE: 20 BRPM | SYSTOLIC BLOOD PRESSURE: 136 MMHG | WEIGHT: 203 LBS | HEIGHT: 67 IN | DIASTOLIC BLOOD PRESSURE: 78 MMHG | BODY MASS INDEX: 31.86 KG/M2

## 2019-07-29 DIAGNOSIS — E78.5 HYPERLIPIDEMIA LDL GOAL <100: ICD-10-CM

## 2019-07-29 DIAGNOSIS — D56.9 THALASSEMIA, UNSPECIFIED TYPE: ICD-10-CM

## 2019-07-29 DIAGNOSIS — R73.09 ELEVATED GLUCOSE: ICD-10-CM

## 2019-07-29 DIAGNOSIS — Z76.89 ENCOUNTER TO ESTABLISH CARE: Primary | ICD-10-CM

## 2019-07-29 DIAGNOSIS — Z12.5 SCREENING FOR PROSTATE CANCER: ICD-10-CM

## 2019-07-29 DIAGNOSIS — I10 BENIGN ESSENTIAL HYPERTENSION: ICD-10-CM

## 2019-07-29 PROBLEM — N52.9 ERECTILE DYSFUNCTION, UNSPECIFIED ERECTILE DYSFUNCTION TYPE: Status: ACTIVE | Noted: 2018-10-02

## 2019-07-29 PROBLEM — E66.811 CLASS 1 OBESITY WITH SERIOUS COMORBIDITY AND BODY MASS INDEX (BMI) OF 33.0 TO 33.9 IN ADULT, UNSPECIFIED OBESITY TYPE: Status: ACTIVE | Noted: 2017-09-28

## 2019-07-29 PROBLEM — H26.121: Status: ACTIVE | Noted: 2018-01-18

## 2019-07-29 PROCEDURE — 99214 OFFICE O/P EST MOD 30 MIN: CPT | Performed by: FAMILY MEDICINE

## 2019-07-29 SDOH — HEALTH STABILITY: MENTAL HEALTH: HOW MANY STANDARD DRINKS CONTAINING ALCOHOL DO YOU HAVE ON A TYPICAL DAY?: 1 OR 2

## 2019-07-29 SDOH — HEALTH STABILITY: MENTAL HEALTH: HOW OFTEN DO YOU HAVE A DRINK CONTAINING ALCOHOL?: 4 OR MORE TIMES A WEEK

## 2019-07-29 ASSESSMENT — ANXIETY QUESTIONNAIRES
GAD7 TOTAL SCORE: 0
7. FEELING AFRAID AS IF SOMETHING AWFUL MIGHT HAPPEN: NOT AT ALL
3. WORRYING TOO MUCH ABOUT DIFFERENT THINGS: NOT AT ALL
4. TROUBLE RELAXING: NOT AT ALL
6. BECOMING EASILY ANNOYED OR IRRITABLE: NOT AT ALL
5. BEING SO RESTLESS THAT IT IS HARD TO SIT STILL: NOT AT ALL
IF YOU CHECKED OFF ANY PROBLEMS ON THIS QUESTIONNAIRE, HOW DIFFICULT HAVE THESE PROBLEMS MADE IT FOR YOU TO DO YOUR WORK, TAKE CARE OF THINGS AT HOME, OR GET ALONG WITH OTHER PEOPLE: NOT DIFFICULT AT ALL
1. FEELING NERVOUS, ANXIOUS, OR ON EDGE: NOT AT ALL
2. NOT BEING ABLE TO STOP OR CONTROL WORRYING: NOT AT ALL

## 2019-07-29 ASSESSMENT — PATIENT HEALTH QUESTIONNAIRE - PHQ9: SUM OF ALL RESPONSES TO PHQ QUESTIONS 1-9: 2

## 2019-07-29 ASSESSMENT — PAIN SCALES - GENERAL: PAINLEVEL: NO PAIN (0)

## 2019-07-29 ASSESSMENT — MIFFLIN-ST. JEOR: SCORE: 1671.49

## 2019-07-29 NOTE — NURSING NOTE
"Chief Complaint   Patient presents with     Establish Care       Initial /78   Pulse 77   Resp 20   Ht 1.689 m (5' 6.5\")   Wt 92.1 kg (203 lb)   SpO2 98%   BMI 32.27 kg/m   Estimated body mass index is 32.27 kg/m  as calculated from the following:    Height as of this encounter: 1.689 m (5' 6.5\").    Weight as of this encounter: 92.1 kg (203 lb).  Medication Reconciliation: complete   Shlel Rinaldi      "

## 2019-07-30 ASSESSMENT — ANXIETY QUESTIONNAIRES: GAD7 TOTAL SCORE: 0

## 2019-10-29 NOTE — PROGRESS NOTES
3  SUBJECTIVE:   CC: Maxx Canales is an 63 year old male who presents for preventive health visit.     Healthy Habits:    Do you get at least three servings of calcium containing foods daily (dairy, green leafy vegetables, etc.)? yes    Amount of exercise or daily activities, outside of work: none    Problems taking medications regularly No    Medication side effects: No    Have you had an eye exam in the past two years? yes    Do you see a dentist twice per year? yes    Do you have sleep apnea, excessive snoring or daytime drowsiness?no      Hyperlipidemia Follow-Up      Are you having any of the following symptoms? (Select all that apply)  No complaints of shortness of breath, chest pain or pressure.  No increased sweating or nausea with activity.  No left-sided neck or arm pain.  No complaints of pain in calves when walking 1-2 blocks.    Are you regularly taking any medication or supplement to lower your cholesterol?   Yes- Zocor    Are you having muscle aches or other side effects that you think could be caused by your cholesterol lowering medication?  No    Hypertension Follow-up      Do you check your blood pressure regularly outside of the clinic? No     Are you following a low salt diet? Yes    Are your blood pressures ever more than 140 on the top number (systolic) OR more   than 90 on the bottom number (diastolic), for example 140/90? No      Today's PHQ-2 Score:   PHQ-2 (  [employer]) 2019   Q1: Little interest or pleasure in doing things 0   Q2: Feeling down, depressed or hopeless 0   PHQ-2 Score 0       Abuse: Current or Past(Physical, Sexual or Emotional)- No  Do you feel safe in your environment? Yes        Social History     Tobacco Use     Smoking status: Former Smoker     Types: Cigarettes     Last attempt to quit: 1999     Years since quittin.8     Smokeless tobacco: Former User     Types: Chew     Tobacco comment: occasional cigar   Substance Use Topics     Alcohol use: Yes      Frequency: 4 or more times a week     Drinks per session: 1 or 2     Comment: 3x week     If you drink alcohol do you typically have >3 drinks per day or >7 drinks per week? No                      Last PSA:   PSA   Date Value Ref Range Status   2019 0.44 0 - 4 ug/L Final     Comment:     Assay Method:  Chemiluminescence using Siemens Vista analyzer       Reviewed orders with patient. Reviewed health maintenance and updated orders accordingly - Yes  BP Readings from Last 3 Encounters:   19 122/74   19 136/78   19 134/70    Wt Readings from Last 3 Encounters:   19 95.3 kg (210 lb)   19 92.1 kg (203 lb)   19 99.8 kg (220 lb)                  Patient Active Problem List   Diagnosis     Diverticulitis of colon     Class 1 obesity with serious comorbidity and body mass index (BMI) of 33.0 to 33.9 in adult, unspecified obesity type     Dysmetabolic syndrome X     Erectile dysfunction, unspecified erectile dysfunction type     Essential hypertension     Partially resolved traumatic cataract of right eye     Primary localized osteoarthrosis, lower leg     Pure hypercholesterolemia     Thalassemia     Past Surgical History:   Procedure Laterality Date     ABDOMEN SURGERY      sigmoid resection, diverticulitis     COLONOSCOPY  10/21/2013    Procedure: COLONOSCOPY;  COLONOSCOPY with biopsy/ polypectomy;  Surgeon: Carisa Brito DO;  Location: [facility]     COLONOSCOPY N/A 10/4/2018    Procedure: COLONOSCOPY;  COLONOSCOPY WITH POLYPECTOMY;  Surgeon: Mj Parsons MD;  Location: [facility]     EYE SURGERY  2018    right     IR RENAL STONE REMOVAL RIGHT  2018    8 cm     VASECTOMY         Social History     Tobacco Use     Smoking status: Former Smoker     Types: Cigarettes     Last attempt to quit: 1999     Years since quittin.8     Smokeless tobacco: Former User     Types: Chew     Tobacco comment: occasional cigar   Substance Use Topics     Alcohol use: Yes     Frequency: 4  or more times a week     Drinks per session: 1 or 2     Comment: 3x week     Family History   Problem Relation Age of Onset     Diabetes Mother         healthy in 90s     Heart Disease Father 49        5 MIs,  of MI         Current Outpatient Medications   Medication Sig Dispense Refill     ASPIRIN PO Take 81 mg by mouth daily       lisinopril (PRINIVIL/ZESTRIL) 20 MG tablet Take 2 tablets (40 mg) by mouth daily 90 tablet 3     Multiple Vitamins-Minerals (MULTIVITAMIN OR)        simvastatin (ZOCOR) 80 MG tablet Take 1 tablet (80 mg) by mouth At Bedtime 90 tablet 3     Allergies   Allergen Reactions     Atorvastatin      Recent Labs   Lab Test 19  0914 10/02/18  10/14/15  1125   A1C 5.6  --   --   --    LDL 98 112  --   --    HDL 56 44  --   --    TRIG 262* 205*  --   --    ALT 29 24  --  41   CR 0.87 0.83   < > 0.81   GFRESTIMATED >90 >60   < > >90  Non  GFR Calc     GFRESTBLACK >90  --   --  >90  African American GFR Calc     POTASSIUM 4.0 4.0   < > 4.2    < > = values in this interval not displayed.        Reviewed and updated as needed this visit by clinical staff  Tobacco  Allergies  Meds  Med Hx  Surg Hx  Fam Hx  Soc Hx        Reviewed and updated as needed this visit by Provider  Tobacco  Allergies  Meds  Med Hx  Surg Hx  Fam Hx  Soc Hx       Past Medical History:   Diagnosis Date     Class 1 obesity with serious comorbidity and body mass index (BMI) of 33.0 to 33.9 in adult, unspecified obesity type 2017     Diverticulitis of colon 10/10/2013     Dysmetabolic syndrome X 10/3/2007     Erectile dysfunction, unspecified erectile dysfunction type 10/2/2018     Essential hypertension 2003    Overview:  IMO Update     Partially resolved traumatic cataract of right eye 2018     Primary localized osteoarthrosis, lower leg 2013     Pure hypercholesterolemia 2003     Thalassemia 10/3/2007      Past Surgical History:   Procedure Laterality Date     ABDOMEN  "SURGERY  1997    sigmoid resection, diverticulitis     COLONOSCOPY  10/21/2013    Procedure: COLONOSCOPY;  COLONOSCOPY with biopsy/ polypectomy;  Surgeon: Carisa Brito DO;  Location: [facility]     COLONOSCOPY N/A 10/4/2018    Procedure: COLONOSCOPY;  COLONOSCOPY WITH POLYPECTOMY;  Surgeon: Mj Parsons MD;  Location: [facility]     EYE SURGERY  2018    right     IR RENAL STONE REMOVAL RIGHT  2018    8 cm     VASECTOMY         ROS:  CONSTITUTIONAL: NEGATIVE for fever, chills, change in weight  INTEGUMENTARY/SKIN: NEGATIVE for worrisome rashes, moles or lesions  EYES: NEGATIVE for vision changes or irritation  ENT: NEGATIVE for ear, mouth and throat problems  RESP: NEGATIVE for significant cough or SOB  CV: NEGATIVE for chest pain, palpitations or peripheral edema  GI: NEGATIVE for nausea, abdominal pain, heartburn, or change in bowel habits   male: negative for dysuria, hematuria, decreased urinary stream, erectile dysfunction, urethral discharge  MUSCULOSKELETAL: NEGATIVE for significant arthralgias or myalgia  NEURO: NEGATIVE for weakness, dizziness or paresthesias  ENDOCRINE: NEGATIVE for temperature intolerance, skin/hair changes  HEME/ALLERGY/IMMUNE: NEGATIVE for bleeding problems  PSYCHIATRIC: NEGATIVE for changes in mood or affect    OBJECTIVE:   /74   Pulse 74   Resp 19   Ht 1.689 m (5' 6.5\")   Wt 95.3 kg (210 lb)   SpO2 98%   BMI 33.39 kg/m    EXAM:  GENERAL: healthy, alert and no distress  EYES: Eyes grossly normal to inspection, PERRL and conjunctivae and sclerae normal  HENT: ear canals and TM's normal, nose and mouth without ulcers or lesions  NECK: no adenopathy, no asymmetry, masses, or scars and thyroid normal to palpation  RESP: lungs clear to auscultation - no rales, rhonchi or wheezes  CV: regular rate and rhythm, normal S1 S2, no S3 or S4, no murmur, click or rub, no peripheral edema and peripheral pulses strong  ABDOMEN: soft, nontender, no hepatosplenomegaly, no masses and bowel " sounds normal   (male): normal male genitalia without lesions or urethral discharge, no hernia  RECTAL: normal sphincter tone, no rectal masses, prostate normal size, smooth, nontender without nodules or masses  MS: no gross musculoskeletal defects noted, no edema  SKIN: no suspicious lesions or rashes  NEURO: Normal strength and tone, mentation intact and speech normal  PSYCH: mentation appears normal, affect normal/bright    Results for orders placed or performed in visit on 11/05/19   Hemoglobin A1c     Status: None   Result Value Ref Range    Hemoglobin A1C 5.6 0 - 5.6 %   Lipid Profile     Status: Abnormal   Result Value Ref Range    Cholesterol 206 (H) <200 mg/dL    Triglycerides 262 (H) <150 mg/dL    HDL Cholesterol 56 >39 mg/dL    LDL Cholesterol Calculated 98 <100 mg/dL    Non HDL Cholesterol 150 (H) <130 mg/dL   Comprehensive metabolic panel     Status: Abnormal   Result Value Ref Range    Sodium 140 133 - 144 mmol/L    Potassium 4.0 3.4 - 5.3 mmol/L    Chloride 109 94 - 109 mmol/L    Carbon Dioxide 25 20 - 32 mmol/L    Anion Gap 6 3 - 14 mmol/L    Glucose 104 (H) 70 - 99 mg/dL    Urea Nitrogen 14 7 - 30 mg/dL    Creatinine 0.87 0.66 - 1.25 mg/dL    GFR Estimate >90 >60 mL/min/[1.73_m2]    GFR Estimate If Black >90 >60 mL/min/[1.73_m2]    Calcium 8.8 8.5 - 10.1 mg/dL    Bilirubin Total 0.9 0.2 - 1.3 mg/dL    Albumin 4.2 3.4 - 5.0 g/dL    Protein Total 7.9 6.8 - 8.8 g/dL    Alkaline Phosphatase 67 40 - 150 U/L    ALT 29 0 - 70 U/L    AST 19 0 - 45 U/L   CBC with platelets     Status: Abnormal   Result Value Ref Range    WBC 10.1 4.0 - 11.0 10e9/L    RBC Count 6.28 (H) 4.4 - 5.9 10e12/L    Hemoglobin 12.7 (L) 13.3 - 17.7 g/dL    Hematocrit 39.8 (L) 40.0 - 53.0 %    MCV 63 (L) 78 - 100 fl    MCH 20.2 (L) 26.5 - 33.0 pg    MCHC 31.9 31.5 - 36.5 g/dL    RDW 17.2 (H) 10.0 - 15.0 %    Platelet Count 183 150 - 450 10e9/L   PSA, screen     Status: None   Result Value Ref Range    PSA 0.44 0 - 4 ug/L   Estimated  "Average Glucose     Status: None   Result Value Ref Range    Estimated Average Glucose 114 mg/dL         ASSESSMENT/PLAN:   1. Routine general medical examination at a health care facility  Doing well. Influenza vaccine was given at work. PSA normal    2. Essential hypertension  Good control, medication renewed, recheck in 1 years. Labs reviewed   - lisinopril (PRINIVIL/ZESTRIL) 20 MG tablet; Take 2 tablets (40 mg) by mouth daily  Dispense: 90 tablet; Refill: 3    3. Pure hypercholesterolemia  Labs reviewed with him. Good control. LDL at goal. Discussed that simvastatin may not be covered by insurance per computer with renewal, discussed process for pre authorization. He has been on this for years and has had excellent control   - simvastatin (ZOCOR) 80 MG tablet; Take 1 tablet (80 mg) by mouth At Bedtime  Dispense: 90 tablet; Refill: 3    4. Dysmetabolic syndrome X  Watching diet, exercising     5. Thalassemia, unspecified type  Reviewed lab with him, stable       COUNSELING:  Reviewed preventive health counseling, as reflected in patient instructions       Regular exercise       Healthy diet/nutrition       Prostate cancer screening    Estimated body mass index is 33.39 kg/m  as calculated from the following:    Height as of this encounter: 1.689 m (5' 6.5\").    Weight as of this encounter: 95.3 kg (210 lb).    Weight management plan: Discussed healthy diet and exercise guidelines     reports that he quit smoking about 20 years ago. His smoking use included cigarettes. He has quit using smokeless tobacco.  His smokeless tobacco use included chew.      Counseling Resources:  ATP IV Guidelines  Pooled Cohorts Equation Calculator  FRAX Risk Assessment  ICSI Preventive Guidelines  Dietary Guidelines for Americans, 2010  USDA's MyPlate  ASA Prophylaxis  Lung CA Screening    Alison Duffy MD  [facility] [place]  "

## 2019-11-05 ENCOUNTER — OFFICE VISIT (OUTPATIENT)
Dept: FAMILY MEDICINE | Facility: OTHER | Age: 64
End: 2019-11-05
Attending: FAMILY MEDICINE
Payer: COMMERCIAL

## 2019-11-05 VITALS
RESPIRATION RATE: 19 BRPM | OXYGEN SATURATION: 98 % | SYSTOLIC BLOOD PRESSURE: 122 MMHG | BODY MASS INDEX: 32.96 KG/M2 | WEIGHT: 210 LBS | HEIGHT: 67 IN | HEART RATE: 74 BPM | DIASTOLIC BLOOD PRESSURE: 74 MMHG

## 2019-11-05 DIAGNOSIS — E78.5 HYPERLIPIDEMIA LDL GOAL <100: ICD-10-CM

## 2019-11-05 DIAGNOSIS — D56.9 THALASSEMIA, UNSPECIFIED TYPE: ICD-10-CM

## 2019-11-05 DIAGNOSIS — Z00.00 ROUTINE GENERAL MEDICAL EXAMINATION AT A HEALTH CARE FACILITY: Primary | ICD-10-CM

## 2019-11-05 DIAGNOSIS — E88.810 DYSMETABOLIC SYNDROME X: ICD-10-CM

## 2019-11-05 DIAGNOSIS — I10 ESSENTIAL HYPERTENSION: ICD-10-CM

## 2019-11-05 DIAGNOSIS — I10 BENIGN ESSENTIAL HYPERTENSION: ICD-10-CM

## 2019-11-05 DIAGNOSIS — Z12.5 SCREENING FOR PROSTATE CANCER: ICD-10-CM

## 2019-11-05 DIAGNOSIS — R73.09 ELEVATED GLUCOSE: ICD-10-CM

## 2019-11-05 DIAGNOSIS — E78.00 PURE HYPERCHOLESTEROLEMIA: ICD-10-CM

## 2019-11-05 LAB
ALBUMIN SERPL-MCNC: 4.2 G/DL (ref 3.4–5)
ALP SERPL-CCNC: 67 U/L (ref 40–150)
ALT SERPL W P-5'-P-CCNC: 29 U/L (ref 0–70)
ANION GAP SERPL CALCULATED.3IONS-SCNC: 6 MMOL/L (ref 3–14)
AST SERPL W P-5'-P-CCNC: 19 U/L (ref 0–45)
BILIRUB SERPL-MCNC: 0.9 MG/DL (ref 0.2–1.3)
BUN SERPL-MCNC: 14 MG/DL (ref 7–30)
CALCIUM SERPL-MCNC: 8.8 MG/DL (ref 8.5–10.1)
CHLORIDE SERPL-SCNC: 109 MMOL/L (ref 94–109)
CHOLEST SERPL-MCNC: 206 MG/DL
CO2 SERPL-SCNC: 25 MMOL/L (ref 20–32)
CREAT SERPL-MCNC: 0.87 MG/DL (ref 0.66–1.25)
ERYTHROCYTE [DISTWIDTH] IN BLOOD BY AUTOMATED COUNT: 17.2 % (ref 10–15)
EST. AVERAGE GLUCOSE BLD GHB EST-MCNC: 114 MG/DL
GFR SERPL CREATININE-BSD FRML MDRD: >90 ML/MIN/{1.73_M2}
GLUCOSE SERPL-MCNC: 104 MG/DL (ref 70–99)
HBA1C MFR BLD: 5.6 % (ref 0–5.6)
HCT VFR BLD AUTO: 39.8 % (ref 40–53)
HDLC SERPL-MCNC: 56 MG/DL
HGB BLD-MCNC: 12.7 G/DL (ref 13.3–17.7)
LDLC SERPL CALC-MCNC: 98 MG/DL
MCH RBC QN AUTO: 20.2 PG (ref 26.5–33)
MCHC RBC AUTO-ENTMCNC: 31.9 G/DL (ref 31.5–36.5)
MCV RBC AUTO: 63 FL (ref 78–100)
NONHDLC SERPL-MCNC: 150 MG/DL
PLATELET # BLD AUTO: 183 10E9/L (ref 150–450)
POTASSIUM SERPL-SCNC: 4 MMOL/L (ref 3.4–5.3)
PROT SERPL-MCNC: 7.9 G/DL (ref 6.8–8.8)
PSA SERPL-ACNC: 0.44 UG/L (ref 0–4)
RBC # BLD AUTO: 6.28 10E12/L (ref 4.4–5.9)
SODIUM SERPL-SCNC: 140 MMOL/L (ref 133–144)
TRIGL SERPL-MCNC: 262 MG/DL
WBC # BLD AUTO: 10.1 10E9/L (ref 4–11)

## 2019-11-05 PROCEDURE — 83036 HEMOGLOBIN GLYCOSYLATED A1C: CPT | Performed by: FAMILY MEDICINE

## 2019-11-05 PROCEDURE — G0103 PSA SCREENING: HCPCS | Performed by: FAMILY MEDICINE

## 2019-11-05 PROCEDURE — 36415 COLL VENOUS BLD VENIPUNCTURE: CPT | Performed by: FAMILY MEDICINE

## 2019-11-05 PROCEDURE — 80061 LIPID PANEL: CPT | Performed by: FAMILY MEDICINE

## 2019-11-05 PROCEDURE — 85027 COMPLETE CBC AUTOMATED: CPT | Performed by: FAMILY MEDICINE

## 2019-11-05 PROCEDURE — 80053 COMPREHEN METABOLIC PANEL: CPT | Performed by: FAMILY MEDICINE

## 2019-11-05 PROCEDURE — 99396 PREV VISIT EST AGE 40-64: CPT | Performed by: FAMILY MEDICINE

## 2019-11-05 RX ORDER — LISINOPRIL 20 MG/1
40 TABLET ORAL DAILY
Qty: 90 TABLET | Refills: 3 | Status: SHIPPED | OUTPATIENT
Start: 2019-11-05 | End: 2020-04-16 | Stop reason: DRUGHIGH

## 2019-11-05 RX ORDER — SIMVASTATIN 80 MG
80 TABLET ORAL AT BEDTIME
Qty: 90 TABLET | Refills: 3 | Status: SHIPPED | OUTPATIENT
Start: 2019-11-05 | End: 2020-11-09

## 2019-11-05 ASSESSMENT — ANXIETY QUESTIONNAIRES
1. FEELING NERVOUS, ANXIOUS, OR ON EDGE: NOT AT ALL
IF YOU CHECKED OFF ANY PROBLEMS ON THIS QUESTIONNAIRE, HOW DIFFICULT HAVE THESE PROBLEMS MADE IT FOR YOU TO DO YOUR WORK, TAKE CARE OF THINGS AT HOME, OR GET ALONG WITH OTHER PEOPLE: NOT DIFFICULT AT ALL
2. NOT BEING ABLE TO STOP OR CONTROL WORRYING: NOT AT ALL
4. TROUBLE RELAXING: NOT AT ALL
GAD7 TOTAL SCORE: 0
6. BECOMING EASILY ANNOYED OR IRRITABLE: NOT AT ALL
5. BEING SO RESTLESS THAT IT IS HARD TO SIT STILL: NOT AT ALL
3. WORRYING TOO MUCH ABOUT DIFFERENT THINGS: NOT AT ALL
7. FEELING AFRAID AS IF SOMETHING AWFUL MIGHT HAPPEN: NOT AT ALL

## 2019-11-05 ASSESSMENT — PATIENT HEALTH QUESTIONNAIRE - PHQ9: SUM OF ALL RESPONSES TO PHQ QUESTIONS 1-9: 0

## 2019-11-05 ASSESSMENT — MIFFLIN-ST. JEOR: SCORE: 1703.24

## 2019-11-05 ASSESSMENT — PAIN SCALES - GENERAL: PAINLEVEL: NO PAIN (0)

## 2019-11-05 NOTE — NURSING NOTE
"Chief Complaint   Patient presents with     Physical       Initial /74   Pulse 74   Resp 19   Ht 1.689 m (5' 6.5\")   Wt 95.3 kg (210 lb)   SpO2 98%   BMI 33.39 kg/m   Estimated body mass index is 33.39 kg/m  as calculated from the following:    Height as of this encounter: 1.689 m (5' 6.5\").    Weight as of this encounter: 95.3 kg (210 lb).  Medication Reconciliation: complete  Shell Rinaldi LPN    "

## 2019-11-06 ASSESSMENT — ANXIETY QUESTIONNAIRES: GAD7 TOTAL SCORE: 0

## 2020-01-16 DIAGNOSIS — I10 ESSENTIAL HYPERTENSION: ICD-10-CM

## 2020-01-16 RX ORDER — LISINOPRIL 20 MG/1
40 TABLET ORAL DAILY
Qty: 90 TABLET | Refills: 3 | OUTPATIENT
Start: 2020-01-16

## 2020-04-15 DIAGNOSIS — I10 BENIGN ESSENTIAL HYPERTENSION: Primary | ICD-10-CM

## 2020-04-15 RX ORDER — LISINOPRIL 40 MG/1
40 TABLET ORAL DAILY
Qty: 90 TABLET | Refills: 1 | Status: SHIPPED | OUTPATIENT
Start: 2020-04-15 | End: 2020-11-09

## 2020-04-15 NOTE — TELEPHONE ENCOUNTER
Patient called and is requesting that the lisinopril script be changed to a 40mg tablet take once a day and a 90 day supply.    lisinopril (PRINIVIL/ZESTRIL) 20 MG tablet       Last Written Prescription Date:  11/5/19  Last Fill Quantity: 90,   # refills: 3  Last Office Visit: 11/5/19  Future Office visit:       Routing refill request to provider for review/approval because:  Drug not on the [facility] or [facility] refill protocol or controlled substance

## 2020-05-14 ENCOUNTER — HOSPITAL ENCOUNTER (EMERGENCY)
Facility: HOSPITAL | Age: 65
Discharge: HOME | End: 2020-05-14
Attending: NURSE PRACTITIONER | Admitting: NURSE PRACTITIONER
Payer: COMMERCIAL

## 2020-05-14 VITALS
TEMPERATURE: 97.3 F | SYSTOLIC BLOOD PRESSURE: 134 MMHG | RESPIRATION RATE: 16 BRPM | OXYGEN SATURATION: 96 % | DIASTOLIC BLOOD PRESSURE: 79 MMHG

## 2020-05-14 DIAGNOSIS — R10.9 FLANK PAIN: Primary | ICD-10-CM

## 2020-05-14 DIAGNOSIS — R31.29 MICROSCOPIC HEMATURIA: ICD-10-CM

## 2020-05-14 LAB
ALBUMIN SERPL-MCNC: 4.3 G/DL (ref 3.4–5)
ALBUMIN UR-MCNC: 30 MG/DL
ALP SERPL-CCNC: 65 U/L (ref 40–150)
ALT SERPL W P-5'-P-CCNC: 38 U/L (ref 0–70)
ANION GAP SERPL CALCULATED.3IONS-SCNC: 5 MMOL/L (ref 3–14)
APPEARANCE UR: ABNORMAL
AST SERPL W P-5'-P-CCNC: 24 U/L (ref 0–45)
BACTERIA #/AREA URNS HPF: ABNORMAL /HPF
BASOPHILS # BLD AUTO: 0.1 10E9/L (ref 0–0.2)
BASOPHILS NFR BLD AUTO: 0.3 %
BILIRUB SERPL-MCNC: 0.7 MG/DL (ref 0.2–1.3)
BILIRUB UR QL STRIP: NEGATIVE
BUN SERPL-MCNC: 18 MG/DL (ref 7–30)
CALCIUM SERPL-MCNC: 9 MG/DL (ref 8.5–10.1)
CHLORIDE SERPL-SCNC: 109 MMOL/L (ref 94–109)
CO2 SERPL-SCNC: 24 MMOL/L (ref 20–32)
COLOR UR AUTO: YELLOW
CREAT SERPL-MCNC: 1.27 MG/DL (ref 0.66–1.25)
CRP SERPL-MCNC: <2.9 MG/L (ref 0–8)
DIFFERENTIAL METHOD BLD: ABNORMAL
EOSINOPHIL # BLD AUTO: 0.1 10E9/L (ref 0–0.7)
EOSINOPHIL NFR BLD AUTO: 0.7 %
ERYTHROCYTE [DISTWIDTH] IN BLOOD BY AUTOMATED COUNT: 16.7 % (ref 10–15)
GFR SERPL CREATININE-BSD FRML MDRD: 60 ML/MIN/{1.73_M2}
GLUCOSE SERPL-MCNC: 133 MG/DL (ref 70–99)
GLUCOSE UR STRIP-MCNC: NEGATIVE MG/DL
HCT VFR BLD AUTO: 37.6 % (ref 40–53)
HGB BLD-MCNC: 12.1 G/DL (ref 13.3–17.7)
HGB UR QL STRIP: ABNORMAL
HYALINE CASTS #/AREA URNS LPF: 35 /LPF
IMM GRANULOCYTES # BLD: 0.1 10E9/L (ref 0–0.4)
IMM GRANULOCYTES NFR BLD: 0.8 %
KETONES UR STRIP-MCNC: NEGATIVE MG/DL
LEUKOCYTE ESTERASE UR QL STRIP: NEGATIVE
LYMPHOCYTES # BLD AUTO: 1.7 10E9/L (ref 0.8–5.3)
LYMPHOCYTES NFR BLD AUTO: 11.7 %
MCH RBC QN AUTO: 20.2 PG (ref 26.5–33)
MCHC RBC AUTO-ENTMCNC: 32.2 G/DL (ref 31.5–36.5)
MCV RBC AUTO: 63 FL (ref 78–100)
MONOCYTES # BLD AUTO: 1.7 10E9/L (ref 0–1.3)
MONOCYTES NFR BLD AUTO: 11.6 %
MUCOUS THREADS #/AREA URNS LPF: PRESENT /LPF
NEUTROPHILS # BLD AUTO: 10.9 10E9/L (ref 1.6–8.3)
NEUTROPHILS NFR BLD AUTO: 74.9 %
NITRATE UR QL: NEGATIVE
NRBC # BLD AUTO: 0 10*3/UL
NRBC BLD AUTO-RTO: 0 /100
PH UR STRIP: 5.5 PH (ref 4.7–8)
PLATELET # BLD AUTO: 180 10E9/L (ref 150–450)
POTASSIUM SERPL-SCNC: 4 MMOL/L (ref 3.4–5.3)
PROT SERPL-MCNC: 7.9 G/DL (ref 6.8–8.8)
RBC # BLD AUTO: 5.98 10E12/L (ref 4.4–5.9)
RBC #/AREA URNS AUTO: >182 /HPF (ref 0–2)
SODIUM SERPL-SCNC: 138 MMOL/L (ref 133–144)
SOURCE: ABNORMAL
SP GR UR STRIP: 1.02 (ref 1–1.03)
UROBILINOGEN UR STRIP-MCNC: NORMAL MG/DL (ref 0–2)
WBC # BLD AUTO: 14.5 10E9/L (ref 4–11)
WBC #/AREA URNS AUTO: 6 /HPF (ref 0–5)

## 2020-05-14 PROCEDURE — 99284 EMERGENCY DEPT VISIT MOD MDM: CPT | Mod: Z6 | Performed by: NURSE PRACTITIONER

## 2020-05-14 PROCEDURE — 81001 URINALYSIS AUTO W/SCOPE: CPT | Performed by: NURSE PRACTITIONER

## 2020-05-14 PROCEDURE — 86140 C-REACTIVE PROTEIN: CPT | Performed by: NURSE PRACTITIONER

## 2020-05-14 PROCEDURE — 80053 COMPREHEN METABOLIC PANEL: CPT | Performed by: NURSE PRACTITIONER

## 2020-05-14 PROCEDURE — 85025 COMPLETE CBC W/AUTO DIFF WBC: CPT | Performed by: NURSE PRACTITIONER

## 2020-05-14 PROCEDURE — 25000132 ZZH RX MED GY IP 250 OP 250 PS 637: Performed by: NURSE PRACTITIONER

## 2020-05-14 PROCEDURE — 36415 COLL VENOUS BLD VENIPUNCTURE: CPT | Performed by: NURSE PRACTITIONER

## 2020-05-14 PROCEDURE — 99283 EMERGENCY DEPT VISIT LOW MDM: CPT

## 2020-05-14 RX ORDER — SULFAMETHOXAZOLE/TRIMETHOPRIM 800-160 MG
1 TABLET ORAL 2 TIMES DAILY
Qty: 6 TABLET | Refills: 0 | Status: SHIPPED | OUTPATIENT
Start: 2020-05-14 | End: 2020-05-17

## 2020-05-14 RX ORDER — SULFAMETHOXAZOLE/TRIMETHOPRIM 800-160 MG
1 TABLET ORAL ONCE
Status: COMPLETED | OUTPATIENT
Start: 2020-05-14 | End: 2020-05-14

## 2020-05-14 RX ORDER — NAPROXEN SODIUM 220 MG
220 TABLET ORAL 2 TIMES DAILY WITH MEALS
COMMUNITY
End: 2020-11-09

## 2020-05-14 RX ADMIN — SULFAMETHOXAZOLE AND TRIMETHOPRIM 1 TABLET: 800; 160 TABLET ORAL at 21:27

## 2020-05-14 ASSESSMENT — ENCOUNTER SYMPTOMS
BLOOD IN STOOL: 0
SHORTNESS OF BREATH: 0
ABDOMINAL PAIN: 1
LIGHT-HEADEDNESS: 0
DYSURIA: 0
DIFFICULTY URINATING: 0
CONSTIPATION: 0
DIZZINESS: 0
FEVER: 0
HEADACHES: 0
COUGH: 0
DIARRHEA: 0
FREQUENCY: 0
FLANK PAIN: 1
NAUSEA: 0
HEMATURIA: 0
SORE THROAT: 0
CHILLS: 0
VOMITING: 0

## 2020-05-14 NOTE — ED AVS SNAPSHOT
[facility]  [address] [address]  Phone:  [phone]                                    Maxx Canales   MRN: [id number]    Department:  [facility]   Date of Visit:  5/14/2020           After Visit Summary Signature Page    I have received my discharge instructions, and my questions have been answered. I have discussed any challenges I see with this plan with the nurse or doctor.    ..........................................................................................................................................  Patient/Patient Representative Signature      ..........................................................................................................................................  Patient Representative Print Name and Relationship to Patient    ..................................................               ................................................  Date                                   Time    ..........................................................................................................................................  Reviewed by Signature/Title    ...................................................              ..............................................  Date                                               Time          22EPIC Rev 08/18

## 2020-05-15 NOTE — ED TRIAGE NOTES
Pt stated at 1700 he all of a sudden got abdominal pain. Stated abdominal pain stopped but now c/o left flank pain. States history of kidney stones.

## 2020-05-15 NOTE — ED NOTES
Pt ambulatory to ED room 11 with c/o lower abd pain and left sided flank pain that has subsided. Pt states that he has had kidney stones x4 and this pain felt similar. Pt denies dysuria and n/v/d. Last BM was today and pt states that it was not as much as he normally goes.

## 2020-05-15 NOTE — DISCHARGE INSTRUCTIONS
(R10.9) Flank pain  (primary encounter diagnosis)  (R31.29) Microscopic hematuria  (R79.89) Creatinine elevation  64-year old male presents ambulatory from home with concerns of abrupt onset left sided abdominal/flank pain of 8/10 around 1830 today. Pain improved on his drive in, in triage 3/10, and currently 0/10. He has a history of diverticulitis with colectomy, renal calculi. No associated nausea, vomiting, diarrhea, constipation, urinary symptoms including dysuria, urinary frequency, gross hematuria. No CVA tenderness or abdominal tenderness on exam. Urinalysis with microscopic hematuria, WBC, bacteria- suspecting he passed a stone. Given WBCs and bacteria will treat with bactrim DS twice daily x 3 days. Low suspicion of diverticulitis given abrupt onset and resolution of pain. At this time he declines scan and with resolution of symptoms I think this is reasonable given how comfortable he is and no concerning exam findings.   No acute electrolyte abnormalities. Creatinine is slightly elevated at 1.27- recommend drinking plenty of fluids, avoid alcohol, avoid NSAIDs such as ibuprofen (Advil), naproxen (Aleve). Watch intake of carbohydrates/sugars- random glucose is 133. He has leukocytosis of 14.5 with left shift, afebrile, CRP is normal, continues to be asymptomatic. He is given first dose of bactrim in ED and will  prescription to complete course tomorrow.       RETURN TO THE ED WITH NEW OR WORSENING SYMPTOMS INCLUDING BUT NOT LIMITED TO RECURRENCE OF PAIN, VOMITING, URINARY SYMPTOMS, ETC.    FOLLOW-UP WITH YOUR PRIMARY CARE PROVIDER IN 5-7 DAYS.      Felipa Ortiz, CNP

## 2020-05-15 NOTE — ED NOTES
Discharge instructions reviewed with patient, and patient verbalized understanding. Rx sent to [facility] in [place]. Pt ambulated with a steady gait to the exit.

## 2020-05-15 NOTE — ED PROVIDER NOTES
"  History     Chief Complaint   Patient presents with     Flank Pain     HPI     Maxx Canales is a 64 year old male who presents ambulatory with concerns of sudden onset of abdominal pain at about 1830  8/10 pain for about 2 hours with about 1 hour of left flank pain. In triage pain was 3/10. Pain currently 0/10 pain. Denies associated nausea, vomiting, diarrhea, constipation. While he was in pain \"I was sweating like mad. The closer we got here the better I felt.\" Denies dysuria, urinary frequency, urinary urgency, hematuria. Denies recent cough, SOB, chest pain.     History of renal calculi, diverticulitis with colectomy.       Allergies:  Allergies   Allergen Reactions     Atorvastatin        Problem List:    Patient Active Problem List    Diagnosis Date Noted     Erectile dysfunction, unspecified erectile dysfunction type 10/02/2018     Priority: Medium     Partially resolved traumatic cataract of right eye 01/18/2018     Priority: Medium     Class 1 obesity with serious comorbidity and body mass index (BMI) of 33.0 to 33.9 in adult, unspecified obesity type 09/28/2017     Priority: Medium     Diverticulitis of colon 10/10/2013     Priority: Medium     Primary localized osteoarthrosis, lower leg 02/19/2013     Priority: Medium     Dysmetabolic syndrome X 10/03/2007     Priority: Medium     Thalassemia 10/03/2007     Priority: Medium     Pure hypercholesterolemia 05/12/2003     Priority: Medium     Essential hypertension 04/25/2003     Priority: Medium     Overview:   IMO Update          Past Medical History:    Past Medical History:   Diagnosis Date     Class 1 obesity with serious comorbidity and body mass index (BMI) of 33.0 to 33.9 in adult, unspecified obesity type 9/28/2017     Diverticulitis of colon 10/10/2013     Dysmetabolic syndrome X 10/3/2007     Erectile dysfunction, unspecified erectile dysfunction type 10/2/2018     Essential hypertension 4/25/2003     Partially resolved traumatic cataract of " right eye 2018     Primary localized osteoarthrosis, lower leg 2013     Pure hypercholesterolemia 2003     Thalassemia 10/3/2007       Past Surgical History:    Past Surgical History:   Procedure Laterality Date     ABDOMEN SURGERY      sigmoid resection, diverticulitis     COLONOSCOPY  10/21/2013    Procedure: COLONOSCOPY;  COLONOSCOPY with biopsy/ polypectomy;  Surgeon: Carisa Brito DO;  Location: [facility]     COLONOSCOPY N/A 10/4/2018    Procedure: COLONOSCOPY;  COLONOSCOPY WITH POLYPECTOMY;  Surgeon: Mj Parsons MD;  Location: [facility]     EYE SURGERY  2018    right     IR RENAL STONE REMOVAL RIGHT  2018    8 cm     VASECTOMY         Family History:    Family History   Problem Relation Age of Onset     Diabetes Mother         healthy in 90s     Heart Disease Father 49        5 MIs,  of MI       Social History:  Marital Status:   [2]  Social History     Tobacco Use     Smoking status: Former Smoker     Types: Cigarettes     Last attempt to quit: 1999     Years since quittin.3     Smokeless tobacco: Former User     Types: Chew     Tobacco comment: occasional cigar   Substance Use Topics     Alcohol use: Yes     Frequency: 4 or more times a week     Drinks per session: 1 or 2     Comment: 3x week     Drug use: No        Medications:    ASPIRIN PO  cholecalciferol (VITAMIN D3) 5000 units (125 mcg) capsule  lisinopril (ZESTRIL) 40 MG tablet  Multiple Vitamins-Minerals (MULTIVITAMIN OR)  naproxen sodium (ANAPROX) 220 MG tablet  simvastatin (ZOCOR) 80 MG tablet  sulfamethoxazole-trimethoprim (BACTRIM DS) 800-160 MG tablet          Review of Systems   Constitutional: Negative for chills and fever.   HENT: Negative for congestion and sore throat.    Respiratory: Negative for cough and shortness of breath.    Cardiovascular: Negative for chest pain.   Gastrointestinal: Positive for abdominal pain. Negative for blood in stool, constipation, diarrhea, nausea and vomiting.    Genitourinary: Positive for flank pain (left). Negative for difficulty urinating, dysuria, frequency and hematuria.   Skin: Negative for rash.   Neurological: Negative for dizziness, light-headedness and headaches.       Physical Exam   BP: 134/79  Heart Rate: 84  Temp: 97.3  F (36.3  C)  Resp: 16  SpO2: 96 %      Physical Exam  Constitutional:       General: He is not in acute distress.     Appearance: He is not ill-appearing, toxic-appearing or diaphoretic.   Cardiovascular:      Rate and Rhythm: Normal rate and regular rhythm.      Heart sounds: S1 normal and S2 normal. No murmur. No friction rub. No gallop.    Pulmonary:      Effort: Pulmonary effort is normal.      Breath sounds: Normal breath sounds.   Abdominal:      General: Abdomen is protuberant. Bowel sounds are normal.      Palpations: Abdomen is soft.      Tenderness: There is no abdominal tenderness. There is no right CVA tenderness, left CVA tenderness, guarding or rebound.   Skin:     General: Skin is warm and dry.      Capillary Refill: Capillary refill takes less than 2 seconds.   Neurological:      Mental Status: He is alert and oriented to person, place, and time.      GCS: GCS eye subscore is 4. GCS verbal subscore is 5. GCS motor subscore is 6.      Gait: Gait is intact.   Psychiatric:         Mood and Affect: Mood normal.         Speech: Speech normal.         Behavior: Behavior normal. Behavior is cooperative.         ED Course        Procedures               Results for orders placed or performed during the hospital encounter of 05/14/20 (from the past 24 hour(s))   UA reflex to Microscopic and Culture    Specimen: Midstream Urine   Result Value Ref Range    Color Urine Yellow     Appearance Urine Slightly Cloudy     Glucose Urine Negative NEG^Negative mg/dL    Bilirubin Urine Negative NEG^Negative    Ketones Urine Negative NEG^Negative mg/dL    Specific Gravity Urine 1.019 1.003 - 1.035    Blood Urine Large (A) NEG^Negative    pH Urine  5.5 4.7 - 8.0 pH    Protein Albumin Urine 30 (A) NEG^Negative mg/dL    Urobilinogen mg/dL Normal 0.0 - 2.0 mg/dL    Nitrite Urine Negative NEG^Negative    Leukocyte Esterase Urine Negative NEG^Negative    Source Midstream Urine     RBC Urine >182 (H) 0 - 2 /HPF    WBC Urine 6 (H) 0 - 5 /HPF    Bacteria Urine Few (A) NEG^Negative /HPF    Mucous Urine Present (A) NEG^Negative /LPF    Hyaline Casts 35 (A) OTO2^O - 2 /LPF   CBC with platelets differential   Result Value Ref Range    WBC 14.5 (H) 4.0 - 11.0 10e9/L    RBC Count 5.98 (H) 4.4 - 5.9 10e12/L    Hemoglobin 12.1 (L) 13.3 - 17.7 g/dL    Hematocrit 37.6 (L) 40.0 - 53.0 %    MCV 63 (L) 78 - 100 fl    MCH 20.2 (L) 26.5 - 33.0 pg    MCHC 32.2 31.5 - 36.5 g/dL    RDW 16.7 (H) 10.0 - 15.0 %    Platelet Count 180 150 - 450 10e9/L    Diff Method Automated Method     % Neutrophils 74.9 %    % Lymphocytes 11.7 %    % Monocytes 11.6 %    % Eosinophils 0.7 %    % Basophils 0.3 %    % Immature Granulocytes 0.8 %    Nucleated RBCs 0 0 /100    Absolute Neutrophil 10.9 (H) 1.6 - 8.3 10e9/L    Absolute Lymphocytes 1.7 0.8 - 5.3 10e9/L    Absolute Monocytes 1.7 (H) 0.0 - 1.3 10e9/L    Absolute Eosinophils 0.1 0.0 - 0.7 10e9/L    Absolute Basophils 0.1 0.0 - 0.2 10e9/L    Abs Immature Granulocytes 0.1 0 - 0.4 10e9/L    Absolute Nucleated RBC 0.0    Comprehensive metabolic panel   Result Value Ref Range    Sodium 138 133 - 144 mmol/L    Potassium 4.0 3.4 - 5.3 mmol/L    Chloride 109 94 - 109 mmol/L    Carbon Dioxide 24 20 - 32 mmol/L    Anion Gap 5 3 - 14 mmol/L    Glucose 133 (H) 70 - 99 mg/dL    Urea Nitrogen 18 7 - 30 mg/dL    Creatinine 1.27 (H) 0.66 - 1.25 mg/dL    GFR Estimate 60 (L) >60 mL/min/[1.73_m2]    GFR Estimate If Black 69 >60 mL/min/[1.73_m2]    Calcium 9.0 8.5 - 10.1 mg/dL    Bilirubin Total 0.7 0.2 - 1.3 mg/dL    Albumin 4.3 3.4 - 5.0 g/dL    Protein Total 7.9 6.8 - 8.8 g/dL    Alkaline Phosphatase 65 40 - 150 U/L    ALT 38 0 - 70 U/L    AST 24 0 - 45 U/L   CRP  inflammation   Result Value Ref Range    CRP Inflammation <2.9 0.0 - 8.0 mg/L       Medications   sulfamethoxazole-trimethoprim (BACTRIM DS) 800-160 MG per tablet 1 tablet (1 tablet Oral Given 5/14/20 2127)       Assessments & Plan (with Medical Decision Making)     I have reviewed the nursing notes.    I have reviewed the findings, diagnosis, plan and need for follow up with the patient.  (R10.9) Flank pain  (primary encounter diagnosis)  (R31.29) Microscopic hematuria  (R79.89) Creatinine elevation  64-year old male presents ambulatory from home with concerns of abrupt onset left sided abdominal/flank pain of 8/10 around 1830 today. Pain improved on his drive in, in triage 3/10, and currently 0/10. He has a history of diverticulitis with colectomy, renal calculi. No associated nausea, vomiting, diarrhea, constipation, urinary symptoms including dysuria, urinary frequency, gross hematuria. No CVA tenderness or abdominal tenderness on exam. Urinalysis with microscopic hematuria, WBC, bacteria- suspecting he passed a stone. Given WBCs and bacteria will treat with bactrim DS twice daily x 3 days. Low suspicion of diverticulitis given abrupt onset and resolution of pain. At this time he declines scan and with resolution of symptoms I think this is reasonable given how comfortable he is and no concerning exam findings.   No acute electrolyte abnormalities. Creatinine is slightly elevated at 1.27- recommend drinking plenty of fluids, avoid alcohol, avoid NSAIDs such as ibuprofen (Advil), naproxen (Aleve). Watch intake of carbohydrates/sugars- random glucose is 133. He has leukocytosis of 14.5 with left shift, afebrile, CRP is normal, continues to be asymptomatic. He is given first dose of bactrim in ED and will  prescription to complete course tomorrow.       RETURN TO THE ED WITH NEW OR WORSENING SYMPTOMS INCLUDING BUT NOT LIMITED TO RECURRENCE OF PAIN, VOMITING, URINARY SYMPTOMS, ETC.    FOLLOW-UP WITH YOUR  PRIMARY CARE PROVIDER IN 5-7 DAYS.      Felipa Ortiz CNP        Discharge Medication List as of 5/14/2020  9:28 PM      START taking these medications    Details   sulfamethoxazole-trimethoprim (BACTRIM DS) 800-160 MG tablet Take 1 tablet by mouth 2 times daily for 3 days, Disp-6 tablet,R-0, E-Prescribe             Final diagnoses:   Flank pain   Microscopic hematuria   Creatinine elevation       5/14/2020   [facility]     Felipa Ortiz CNP  05/14/20 3199

## 2020-10-26 NOTE — PROGRESS NOTES
3  SUBJECTIVE:   CC: Maxx Canales is an 64 year old male who presents for preventive health visit.     Healthy Habits:    Do you get at least three servings of calcium containing foods daily (dairy, green leafy vegetables, etc.)? yes    Amount of exercise or daily activities, outside of work: 7 day(s) per week    Problems taking medications regularly No    Medication side effects: No    Have you had an eye exam in the past two years? yes    Do you see a dentist twice per year? yes    Do you have sleep apnea, excessive snoring or daytime drowsiness?no      Hyperlipidemia Follow-Up      Are you regularly taking any medication or supplement to lower your cholesterol?   Yes- Zocor    Are you having muscle aches or other side effects that you think could be caused by your cholesterol lowering medication?  No    Hypertension Follow-up      Do you check your blood pressure regularly outside of the clinic? No     Are you following a low salt diet? Yes    Are your blood pressures ever more than 140 on the top number (systolic) OR more   than 90 on the bottom number (diastolic), for example 140/90? No      Today's PHQ-2 Score:   PHQ-2 (  [employer]) 2020   Q1: Little interest or pleasure in doing things 0 0   Q2: Feeling down, depressed or hopeless 0 0   PHQ-2 Score 0 0       Abuse: Current or Past(Physical, Sexual or Emotional)- NO  Do you feel safe in your environment? Yes        Social History     Tobacco Use     Smoking status: Former Smoker     Types: Cigarettes     Quit date: 1999     Years since quittin.8     Smokeless tobacco: Former User     Types: Chew     Tobacco comment: occasional cigar   Substance Use Topics     Alcohol use: Yes     Frequency: 4 or more times a week     Drinks per session: 1 or 2     Comment: 3x week     If you drink alcohol do you typically have >3 drinks per day or >7 drinks per week? No                      Last PSA:   PSA   Date Value Ref Range Status    2019 0.44 0 - 4 ug/L Final     Comment:     Assay Method:  Chemiluminescence using Siemens Vista analyzer       Reviewed orders with patient. Reviewed health maintenance and updated orders accordingly - Yes  BP Readings from Last 3 Encounters:   20 122/72   20 134/79   19 122/74    Wt Readings from Last 3 Encounters:   20 98 kg (216 lb)   19 95.3 kg (210 lb)   19 92.1 kg (203 lb)                  Patient Active Problem List   Diagnosis     Diverticulitis of colon     Class 1 obesity with serious comorbidity and body mass index (BMI) of 33.0 to 33.9 in adult, unspecified obesity type     Dysmetabolic syndrome X     Erectile dysfunction, unspecified erectile dysfunction type     Essential hypertension     Partially resolved traumatic cataract of right eye     Primary localized osteoarthrosis, lower leg     Pure hypercholesterolemia     Thalassemia     Elevated red blood cell count     Past Surgical History:   Procedure Laterality Date     ABDOMEN SURGERY      sigmoid resection, diverticulitis     COLONOSCOPY  10/21/2013    Procedure: COLONOSCOPY;  COLONOSCOPY with biopsy/ polypectomy;  Surgeon: Carisa Brito DO;  Location: [facility]     COLONOSCOPY N/A 10/4/2018    Procedure: COLONOSCOPY;  COLONOSCOPY WITH POLYPECTOMY;  Surgeon: Mj Parsons MD;  Location: [facility]     EYE SURGERY  2018    right     IR RENAL STONE REMOVAL RIGHT  2018    8 cm     VASECTOMY         Social History     Tobacco Use     Smoking status: Former Smoker     Types: Cigarettes     Quit date: 1999     Years since quittin.8     Smokeless tobacco: Former User     Types: Chew     Tobacco comment: occasional cigar   Substance Use Topics     Alcohol use: Yes     Frequency: 4 or more times a week     Drinks per session: 1 or 2     Comment: 3x week     Family History   Problem Relation Age of Onset     Diabetes Mother         healthy in 90s     Heart Disease Father 49        5 MIs,  of MI          Current Outpatient Medications   Medication Sig Dispense Refill     ASPIRIN PO Take 81 mg by mouth daily       cholecalciferol (VITAMIN D3) 5000 units (125 mcg) capsule Take by mouth daily       lisinopril (ZESTRIL) 40 MG tablet Take 1 tablet (40 mg) by mouth daily 90 tablet 3     Multiple Vitamins-Minerals (MULTIVITAMIN OR)        simvastatin (ZOCOR) 80 MG tablet Take 1 tablet (80 mg) by mouth At Bedtime 90 tablet 3     Allergies   Allergen Reactions     Atorvastatin      Recent Labs   Lab Test 11/09/20  0847 05/14/20 2034 11/05/19 0914 10/02/18   A1C  --   --  5.6  --    LDL 65  --  98 112   HDL 47  --  56 44   TRIG 286*  --  262* 205*   ALT 42 38 29 24   CR 0.85 1.27* 0.87 0.83   GFRESTIMATED >90 60* >90 >60   GFRESTBLACK >90 69 >90  --    POTASSIUM 4.4 4.0 4.0 4.0        Reviewed and updated as needed this visit by clinical staff  Tobacco  Allergies  Meds   Med Hx  Surg Hx  Fam Hx  Soc Hx        Reviewed and updated as needed this visit by Provider                Past Medical History:   Diagnosis Date     Class 1 obesity with serious comorbidity and body mass index (BMI) of 33.0 to 33.9 in adult, unspecified obesity type 9/28/2017     Diverticulitis of colon 10/10/2013     Dysmetabolic syndrome X 10/3/2007     Erectile dysfunction, unspecified erectile dysfunction type 10/2/2018     Essential hypertension 4/25/2003    Overview:  IMO Update     Partially resolved traumatic cataract of right eye 1/18/2018     Primary localized osteoarthrosis, lower leg 2/19/2013     Pure hypercholesterolemia 5/12/2003     Thalassemia 10/3/2007      Past Surgical History:   Procedure Laterality Date     ABDOMEN SURGERY  1997    sigmoid resection, diverticulitis     COLONOSCOPY  10/21/2013    Procedure: COLONOSCOPY;  COLONOSCOPY with biopsy/ polypectomy;  Surgeon: Carisa Brito DO;  Location: [facility]     COLONOSCOPY N/A 10/4/2018    Procedure: COLONOSCOPY;  COLONOSCOPY WITH POLYPECTOMY;  Surgeon: Mj Parsons MD;   Location: [facility]     EYE SURGERY  2018    right     IR RENAL STONE REMOVAL RIGHT  2018    8 cm     VASECTOMY         ROS:  CONSTITUTIONAL: NEGATIVE for fever, chills, change in weight  INTEGUMENTARY/SKIN: NEGATIVE for worrisome rashes, moles or lesions  EYES: NEGATIVE for vision changes or irritation  ENT: NEGATIVE for ear, mouth and throat problems  RESP: NEGATIVE for significant cough or SOB  CV: NEGATIVE for chest pain, palpitations or peripheral edema  GI: NEGATIVE for nausea, abdominal pain, heartburn, or change in bowel habits   male: negative for dysuria, hematuria, decreased urinary stream, erectile dysfunction, urethral discharge  MUSCULOSKELETAL: NEGATIVE for significant arthralgias or myalgia  NEURO: NEGATIVE for weakness, dizziness or paresthesias  PSYCHIATRIC: NEGATIVE for changes in mood or affect    OBJECTIVE:   There were no vitals taken for this visit.  EXAM:  GENERAL: healthy, alert and no distress  EYES: Eyes grossly normal to inspection, PERRL and conjunctivae and sclerae normal  HENT: ear canals and TM's normal, nose and mouth without ulcers or lesions  NECK: no adenopathy, no asymmetry, masses, or scars and thyroid normal to palpation  RESP: lungs clear to auscultation - no rales, rhonchi or wheezes  CV: regular rate and rhythm, normal S1 S2, no S3 or S4, no murmur, click or rub, no peripheral edema and peripheral pulses strong  ABDOMEN: soft, nontender, no hepatosplenomegaly, no masses and bowel sounds normal   (male): normal male genitalia without lesions or urethral discharge, no hernia  RECTAL: normal sphincter tone, no rectal masses, prostate normal size, smooth, nontender without nodules or masses  MS: no gross musculoskeletal defects noted, no edema  SKIN: no suspicious lesions or rashes  NEURO: Normal strength and tone, mentation intact and speech normal  PSYCH: mentation appears normal, affect normal/bright    Results for orders placed or performed in visit on 11/09/20   CBC with  platelets     Status: Abnormal   Result Value Ref Range    WBC 8.2 4.0 - 11.0 10e9/L    RBC Count 6.28 (H) 4.4 - 5.9 10e12/L    Hemoglobin 12.6 (L) 13.3 - 17.7 g/dL    Hematocrit 39.4 (L) 40.0 - 53.0 %    MCV 63 (L) 78 - 100 fl    MCH 20.1 (L) 26.5 - 33.0 pg    MCHC 32.0 31.5 - 36.5 g/dL    RDW 17.2 (H) 10.0 - 15.0 %    Platelet Count 180 150 - 450 10e9/L   Comprehensive metabolic panel     Status: Abnormal   Result Value Ref Range    Sodium 138 133 - 144 mmol/L    Potassium 4.4 3.4 - 5.3 mmol/L    Chloride 107 94 - 109 mmol/L    Carbon Dioxide 26 20 - 32 mmol/L    Anion Gap 5 3 - 14 mmol/L    Glucose 109 (H) 70 - 99 mg/dL    Urea Nitrogen 18 7 - 30 mg/dL    Creatinine 0.85 0.66 - 1.25 mg/dL    GFR Estimate >90 >60 mL/min/[1.73_m2]    GFR Estimate If Black >90 >60 mL/min/[1.73_m2]    Calcium 8.7 8.5 - 10.1 mg/dL    Bilirubin Total 0.8 0.2 - 1.3 mg/dL    Albumin 4.1 3.4 - 5.0 g/dL    Protein Total 7.8 6.8 - 8.8 g/dL    Alkaline Phosphatase 89 40 - 150 U/L    ALT 42 0 - 70 U/L    AST 26 0 - 45 U/L   Lipid Profile     Status: Abnormal   Result Value Ref Range    Cholesterol 169 <200 mg/dL    Triglycerides 286 (H) <150 mg/dL    HDL Cholesterol 47 >39 mg/dL    LDL Cholesterol Calculated 65 <100 mg/dL    Non HDL Cholesterol 122 <130 mg/dL         ASSESSMENT/PLAN:   1. Routine general medical examination at a health care facility  Doing very well  Labs done today are reviewed with him  HIV and hepatitis C screening are declined     2. Dysmetabolic syndrome X  Lab pending, glucose is borderline  - Hemoglobin A1c    3. Essential hypertension  Good control on current medications  - CBC with platelets  - Comprehensive metabolic panel    4. Pure hypercholesterolemia  Excellent control on zocor, renewed   - simvastatin (ZOCOR) 80 MG tablet; Take 1 tablet (80 mg) by mouth At Bedtime  Dispense: 90 tablet; Refill: 3    5. Thalassemia, unspecified type  Stable     6. Immunization due  Given todah  - PCV13, IM (6+ WK) - [id number]  -  "ADMIN 1st VACCINE    7. Encounter for hepatitis C screening test for low risk patient  Declined     8. Screening for HIV (human immunodeficiency virus)  Declined         9. Screening for prostate cancer  Lab pending, no symptoms   - PSA, screen    10. Benign essential hypertension  Renewed   - lisinopril (ZESTRIL) 40 MG tablet; Take 1 tablet (40 mg) by mouth daily  Dispense: 90 tablet; Refill: 3    12. Elevated red blood cell count  With thalassemia, discussed referral to hematology but since this has been long standing since at least 5 years, will continue to follow and reconsider in a year       COUNSELING:  Reviewed preventive health counseling, as reflected in patient instructions       Regular exercise       Healthy diet/nutrition       Immunizations    Vaccinated for: Pneumococcal          Estimated body mass index is 33.39 kg/m  as calculated from the following:    Height as of 11/5/19: 1.689 m (5' 6.5\").    Weight as of 11/5/19: 95.3 kg (210 lb).        He reports that he quit smoking about 21 years ago. His smoking use included cigarettes. He has quit using smokeless tobacco.  His smokeless tobacco use included chew.      Counseling Resources:  ATP IV Guidelines  Pooled Cohorts Equation Calculator  FRAX Risk Assessment  ICSI Preventive Guidelines  Dietary Guidelines for Americans, 2010  USDA's MyPlate  ASA Prophylaxis  Lung CA Screening    Alison Duffy MD  [facility] [place]  "

## 2020-11-09 ENCOUNTER — OFFICE VISIT (OUTPATIENT)
Dept: FAMILY MEDICINE | Facility: OTHER | Age: 65
End: 2020-11-09
Attending: FAMILY MEDICINE
Payer: COMMERCIAL

## 2020-11-09 ENCOUNTER — APPOINTMENT (OUTPATIENT)
Dept: LAB | Facility: OTHER | Age: 65
End: 2020-11-09
Attending: FAMILY MEDICINE
Payer: COMMERCIAL

## 2020-11-09 VITALS
TEMPERATURE: 97.9 F | SYSTOLIC BLOOD PRESSURE: 122 MMHG | HEART RATE: 87 BPM | OXYGEN SATURATION: 96 % | HEIGHT: 67 IN | DIASTOLIC BLOOD PRESSURE: 72 MMHG | WEIGHT: 216 LBS | BODY MASS INDEX: 33.9 KG/M2

## 2020-11-09 DIAGNOSIS — Z11.4 SCREENING FOR HIV (HUMAN IMMUNODEFICIENCY VIRUS): ICD-10-CM

## 2020-11-09 DIAGNOSIS — Z13.220 LIPID SCREENING: ICD-10-CM

## 2020-11-09 DIAGNOSIS — Z12.5 SCREENING FOR PROSTATE CANCER: ICD-10-CM

## 2020-11-09 DIAGNOSIS — E78.00 PURE HYPERCHOLESTEROLEMIA: ICD-10-CM

## 2020-11-09 DIAGNOSIS — E88.810 DYSMETABOLIC SYNDROME X: ICD-10-CM

## 2020-11-09 DIAGNOSIS — I10 BENIGN ESSENTIAL HYPERTENSION: ICD-10-CM

## 2020-11-09 DIAGNOSIS — R71.8 ELEVATED RED BLOOD CELL COUNT: ICD-10-CM

## 2020-11-09 DIAGNOSIS — Z11.59 ENCOUNTER FOR HEPATITIS C SCREENING TEST FOR LOW RISK PATIENT: ICD-10-CM

## 2020-11-09 DIAGNOSIS — I10 ESSENTIAL HYPERTENSION: ICD-10-CM

## 2020-11-09 DIAGNOSIS — D56.9 THALASSEMIA, UNSPECIFIED TYPE: ICD-10-CM

## 2020-11-09 DIAGNOSIS — Z00.00 ROUTINE GENERAL MEDICAL EXAMINATION AT A HEALTH CARE FACILITY: Primary | ICD-10-CM

## 2020-11-09 DIAGNOSIS — Z23 IMMUNIZATION DUE: ICD-10-CM

## 2020-11-09 LAB
ALBUMIN SERPL-MCNC: 4.1 G/DL (ref 3.4–5)
ALP SERPL-CCNC: 89 U/L (ref 40–150)
ALT SERPL W P-5'-P-CCNC: 42 U/L (ref 0–70)
ANION GAP SERPL CALCULATED.3IONS-SCNC: 5 MMOL/L (ref 3–14)
AST SERPL W P-5'-P-CCNC: 26 U/L (ref 0–45)
BILIRUB SERPL-MCNC: 0.8 MG/DL (ref 0.2–1.3)
BUN SERPL-MCNC: 18 MG/DL (ref 7–30)
CALCIUM SERPL-MCNC: 8.7 MG/DL (ref 8.5–10.1)
CHLORIDE SERPL-SCNC: 107 MMOL/L (ref 94–109)
CHOLEST SERPL-MCNC: 169 MG/DL
CO2 SERPL-SCNC: 26 MMOL/L (ref 20–32)
CREAT SERPL-MCNC: 0.85 MG/DL (ref 0.66–1.25)
ERYTHROCYTE [DISTWIDTH] IN BLOOD BY AUTOMATED COUNT: 17.2 % (ref 10–15)
EST. AVERAGE GLUCOSE BLD GHB EST-MCNC: 123 MG/DL
GFR SERPL CREATININE-BSD FRML MDRD: >90 ML/MIN/{1.73_M2}
GLUCOSE SERPL-MCNC: 109 MG/DL (ref 70–99)
HBA1C MFR BLD: 5.9 % (ref 0–5.6)
HCT VFR BLD AUTO: 39.4 % (ref 40–53)
HDLC SERPL-MCNC: 47 MG/DL
HGB BLD-MCNC: 12.6 G/DL (ref 13.3–17.7)
LDLC SERPL CALC-MCNC: 65 MG/DL
MCH RBC QN AUTO: 20.1 PG (ref 26.5–33)
MCHC RBC AUTO-ENTMCNC: 32 G/DL (ref 31.5–36.5)
MCV RBC AUTO: 63 FL (ref 78–100)
NONHDLC SERPL-MCNC: 122 MG/DL
PLATELET # BLD AUTO: 180 10E9/L (ref 150–450)
POTASSIUM SERPL-SCNC: 4.4 MMOL/L (ref 3.4–5.3)
PROT SERPL-MCNC: 7.8 G/DL (ref 6.8–8.8)
PSA SERPL-ACNC: 0.34 UG/L (ref 0–4)
RBC # BLD AUTO: 6.28 10E12/L (ref 4.4–5.9)
SODIUM SERPL-SCNC: 138 MMOL/L (ref 133–144)
TRIGL SERPL-MCNC: 286 MG/DL
WBC # BLD AUTO: 8.2 10E9/L (ref 4–11)

## 2020-11-09 PROCEDURE — 99396 PREV VISIT EST AGE 40-64: CPT | Mod: 25 | Performed by: FAMILY MEDICINE

## 2020-11-09 PROCEDURE — G0103 PSA SCREENING: HCPCS | Performed by: FAMILY MEDICINE

## 2020-11-09 PROCEDURE — 83036 HEMOGLOBIN GLYCOSYLATED A1C: CPT | Performed by: FAMILY MEDICINE

## 2020-11-09 PROCEDURE — 85027 COMPLETE CBC AUTOMATED: CPT | Performed by: FAMILY MEDICINE

## 2020-11-09 PROCEDURE — 80061 LIPID PANEL: CPT | Performed by: FAMILY MEDICINE

## 2020-11-09 PROCEDURE — 80053 COMPREHEN METABOLIC PANEL: CPT | Performed by: FAMILY MEDICINE

## 2020-11-09 PROCEDURE — 90471 IMMUNIZATION ADMIN: CPT | Performed by: FAMILY MEDICINE

## 2020-11-09 PROCEDURE — 36415 COLL VENOUS BLD VENIPUNCTURE: CPT | Performed by: FAMILY MEDICINE

## 2020-11-09 PROCEDURE — 90670 PCV13 VACCINE IM: CPT | Performed by: FAMILY MEDICINE

## 2020-11-09 RX ORDER — SIMVASTATIN 80 MG
80 TABLET ORAL AT BEDTIME
Qty: 90 TABLET | Refills: 3 | Status: ON HOLD | OUTPATIENT
Start: 2020-11-09 | End: 2021-02-10

## 2020-11-09 RX ORDER — LISINOPRIL 40 MG/1
40 TABLET ORAL DAILY
Qty: 90 TABLET | Refills: 3 | Status: SHIPPED | OUTPATIENT
Start: 2020-11-09 | End: 2021-11-15

## 2020-11-09 ASSESSMENT — ANXIETY QUESTIONNAIRES
6. BECOMING EASILY ANNOYED OR IRRITABLE: NOT AT ALL
7. FEELING AFRAID AS IF SOMETHING AWFUL MIGHT HAPPEN: NOT AT ALL
GAD7 TOTAL SCORE: 1
5. BEING SO RESTLESS THAT IT IS HARD TO SIT STILL: SEVERAL DAYS
2. NOT BEING ABLE TO STOP OR CONTROL WORRYING: NOT AT ALL
1. FEELING NERVOUS, ANXIOUS, OR ON EDGE: NOT AT ALL
3. WORRYING TOO MUCH ABOUT DIFFERENT THINGS: NOT AT ALL
4. TROUBLE RELAXING: NOT AT ALL

## 2020-11-09 ASSESSMENT — PATIENT HEALTH QUESTIONNAIRE - PHQ9: SUM OF ALL RESPONSES TO PHQ QUESTIONS 1-9: 1

## 2020-11-09 ASSESSMENT — MIFFLIN-ST. JEOR: SCORE: 1725.46

## 2020-11-09 ASSESSMENT — PAIN SCALES - GENERAL: PAINLEVEL: NO PAIN (0)

## 2020-11-09 NOTE — NURSING NOTE
"Chief Complaint   Patient presents with     Physical       Initial /72 (BP Location: Left arm, Patient Position: Chair, Cuff Size: Adult Large)   Pulse 87   Temp 97.9  F (36.6  C) (Tympanic)   Ht 1.689 m (5' 6.5\")   Wt 98 kg (216 lb)   SpO2 96%   BMI 34.34 kg/m   Estimated body mass index is 34.34 kg/m  as calculated from the following:    Height as of this encounter: 1.689 m (5' 6.5\").    Weight as of this encounter: 98 kg (216 lb).  Medication Reconciliation: complete  KULDIP LORD LPN  "

## 2020-11-10 ASSESSMENT — ANXIETY QUESTIONNAIRES: GAD7 TOTAL SCORE: 1

## 2021-02-09 ENCOUNTER — OFFICE VISIT (OUTPATIENT)
Dept: FAMILY MEDICINE | Facility: OTHER | Age: 66
End: 2021-02-09
Attending: FAMILY MEDICINE
Payer: COMMERCIAL

## 2021-02-09 ENCOUNTER — APPOINTMENT (OUTPATIENT)
Dept: CT IMAGING | Facility: HOSPITAL | Age: 66
End: 2021-02-09
Attending: NURSE PRACTITIONER
Payer: COMMERCIAL

## 2021-02-09 ENCOUNTER — HOSPITAL ENCOUNTER (INPATIENT)
Facility: OTHER | Age: 66
LOS: 2 days | Discharge: HOME | End: 2021-02-11
Attending: FAMILY MEDICINE | Admitting: FAMILY MEDICINE
Payer: COMMERCIAL

## 2021-02-09 ENCOUNTER — ANESTHESIA EVENT (OUTPATIENT)
Dept: SURGERY | Facility: OTHER | Age: 66
End: 2021-02-09
Payer: COMMERCIAL

## 2021-02-09 ENCOUNTER — HOSPITAL ENCOUNTER (OUTPATIENT)
Facility: OTHER | Age: 66
End: 2021-02-09
Attending: UROLOGY | Admitting: UROLOGY
Payer: COMMERCIAL

## 2021-02-09 ENCOUNTER — HOSPITAL ENCOUNTER (EMERGENCY)
Facility: HOSPITAL | Age: 66
Discharge: ANOTHER HOSPITAL | End: 2021-02-09
Attending: NURSE PRACTITIONER | Admitting: NURSE PRACTITIONER
Payer: COMMERCIAL

## 2021-02-09 ENCOUNTER — APPOINTMENT (OUTPATIENT)
Dept: GENERAL RADIOLOGY | Facility: OTHER | Age: 66
End: 2021-02-09
Attending: UROLOGY
Payer: COMMERCIAL

## 2021-02-09 ENCOUNTER — NURSE TRIAGE (OUTPATIENT)
Dept: FAMILY MEDICINE | Facility: OTHER | Age: 66
End: 2021-02-09

## 2021-02-09 ENCOUNTER — ANESTHESIA (OUTPATIENT)
Dept: SURGERY | Facility: OTHER | Age: 66
End: 2021-02-09
Payer: COMMERCIAL

## 2021-02-09 VITALS
SYSTOLIC BLOOD PRESSURE: 105 MMHG | TEMPERATURE: 99.5 F | HEART RATE: 96 BPM | DIASTOLIC BLOOD PRESSURE: 62 MMHG | OXYGEN SATURATION: 94 % | RESPIRATION RATE: 16 BRPM

## 2021-02-09 VITALS
HEART RATE: 117 BPM | OXYGEN SATURATION: 94 % | DIASTOLIC BLOOD PRESSURE: 72 MMHG | TEMPERATURE: 103.1 F | BODY MASS INDEX: 36.1 KG/M2 | RESPIRATION RATE: 19 BRPM | SYSTOLIC BLOOD PRESSURE: 128 MMHG | HEIGHT: 67 IN | WEIGHT: 230 LBS

## 2021-02-09 DIAGNOSIS — N10 ACUTE PYELONEPHRITIS: ICD-10-CM

## 2021-02-09 DIAGNOSIS — R30.0 DYSURIA: ICD-10-CM

## 2021-02-09 DIAGNOSIS — A41.9 SEPSIS WITHOUT ACUTE ORGAN DYSFUNCTION, DUE TO UNSPECIFIED ORGANISM (H): ICD-10-CM

## 2021-02-09 DIAGNOSIS — K59.03 DRUG-INDUCED CONSTIPATION: Primary | ICD-10-CM

## 2021-02-09 DIAGNOSIS — N10 PYELONEPHRITIS, ACUTE: Primary | ICD-10-CM

## 2021-02-09 DIAGNOSIS — Q62.11 HYDRONEPHROSIS WITH URETEROPELVIC JUNCTION (UPJ) OBSTRUCTION: ICD-10-CM

## 2021-02-09 DIAGNOSIS — R82.90 ABNORMAL URINE FINDINGS: Primary | ICD-10-CM

## 2021-02-09 DIAGNOSIS — R30.0 DYSURIA: Primary | ICD-10-CM

## 2021-02-09 DIAGNOSIS — N10 ACUTE PYELONEPHRITIS: Primary | ICD-10-CM

## 2021-02-09 PROBLEM — N13.30 HYDRONEPHROSIS OF LEFT KIDNEY: Status: ACTIVE | Noted: 2021-02-09

## 2021-02-09 PROBLEM — N20.0 RENAL LITHIASIS: Status: ACTIVE | Noted: 2021-02-09

## 2021-02-09 LAB
ALBUMIN SERPL-MCNC: 3.6 G/DL (ref 3.4–5)
ALBUMIN UR-MCNC: 30 MG/DL
ALP SERPL-CCNC: 79 U/L (ref 40–150)
ALT SERPL W P-5'-P-CCNC: 25 U/L (ref 0–70)
AMORPH CRY #/AREA URNS HPF: ABNORMAL /HPF
ANION GAP SERPL CALCULATED.3IONS-SCNC: 7 MMOL/L (ref 3–14)
ANISOCYTOSIS BLD QL SMEAR: SLIGHT
APPEARANCE UR: ABNORMAL
AST SERPL W P-5'-P-CCNC: 17 U/L (ref 0–45)
BACTERIA #/AREA URNS HPF: ABNORMAL /HPF
BASOPHILS # BLD AUTO: 0 10E9/L (ref 0–0.2)
BASOPHILS NFR BLD AUTO: 0.2 %
BILIRUB SERPL-MCNC: 1 MG/DL (ref 0.2–1.3)
BILIRUB UR QL STRIP: NEGATIVE
BUN SERPL-MCNC: 12 MG/DL (ref 7–30)
CALCIUM SERPL-MCNC: 9 MG/DL (ref 8.5–10.1)
CHLORIDE SERPL-SCNC: 102 MMOL/L (ref 94–109)
CO2 SERPL-SCNC: 24 MMOL/L (ref 20–32)
COLOR UR AUTO: YELLOW
CREAT SERPL-MCNC: 1.01 MG/DL (ref 0.66–1.25)
DIFFERENTIAL METHOD BLD: ABNORMAL
EOSINOPHIL # BLD AUTO: 0 10E9/L (ref 0–0.7)
EOSINOPHIL NFR BLD AUTO: 0 %
ERYTHROCYTE [DISTWIDTH] IN BLOOD BY AUTOMATED COUNT: 15.7 % (ref 10–15)
FLUAV RNA RESP QL NAA+PROBE: NEGATIVE
FLUBV RNA RESP QL NAA+PROBE: NEGATIVE
GFR SERPL CREATININE-BSD FRML MDRD: 78 ML/MIN/{1.73_M2}
GLUCOSE SERPL-MCNC: 131 MG/DL (ref 70–99)
GLUCOSE UR STRIP-MCNC: NEGATIVE MG/DL
HCT VFR BLD AUTO: 35.6 % (ref 40–53)
HGB BLD-MCNC: 11.6 G/DL (ref 13.3–17.7)
HGB UR QL STRIP: ABNORMAL
HYPOCHROMIA BLD QL: PRESENT
IMM GRANULOCYTES # BLD: 0.2 10E9/L (ref 0–0.4)
IMM GRANULOCYTES NFR BLD: 0.9 %
KETONES UR STRIP-MCNC: NEGATIVE MG/DL
LABORATORY COMMENT REPORT: NORMAL
LACTATE BLD-SCNC: 1.7 MMOL/L (ref 0.7–2)
LEUKOCYTE ESTERASE UR QL STRIP: ABNORMAL
LYMPHOCYTES # BLD AUTO: 1.7 10E9/L (ref 0.8–5.3)
LYMPHOCYTES NFR BLD AUTO: 9.2 %
MCH RBC QN AUTO: 20.2 PG (ref 26.5–33)
MCHC RBC AUTO-ENTMCNC: 32.6 G/DL (ref 31.5–36.5)
MCV RBC AUTO: 62 FL (ref 78–100)
MONOCYTES # BLD AUTO: 2.4 10E9/L (ref 0–1.3)
MONOCYTES NFR BLD AUTO: 13.4 %
MUCOUS THREADS #/AREA URNS LPF: PRESENT /LPF
NEUTROPHILS # BLD AUTO: 13.7 10E9/L (ref 1.6–8.3)
NEUTROPHILS NFR BLD AUTO: 76.3 %
NITRATE UR QL: NEGATIVE
NRBC # BLD AUTO: 0 10*3/UL
NRBC BLD AUTO-RTO: 0 /100
PH UR STRIP: 6 PH (ref 4.7–8)
PLATELET # BLD AUTO: 212 10E9/L (ref 150–450)
PLATELET # BLD EST: ABNORMAL 10*3/UL
POIKILOCYTOSIS BLD QL SMEAR: SLIGHT
POTASSIUM SERPL-SCNC: 3.9 MMOL/L (ref 3.4–5.3)
PROCALCITONIN SERPL-MCNC: 0.11 NG/ML
PROT SERPL-MCNC: 7.8 G/DL (ref 6.8–8.8)
RBC # BLD AUTO: 5.73 10E12/L (ref 4.4–5.9)
RBC #/AREA URNS AUTO: 17 /HPF (ref 0–2)
RBC MORPH BLD: ABNORMAL
RSV RNA SPEC QL NAA+PROBE: NEGATIVE
SARS-COV-2 RNA RESP QL NAA+PROBE: NEGATIVE
SODIUM SERPL-SCNC: 133 MMOL/L (ref 133–144)
SOURCE: ABNORMAL
SP GR UR STRIP: 1.02 (ref 1–1.03)
SPECIMEN SOURCE: NORMAL
SQUAMOUS #/AREA URNS AUTO: 1 /HPF (ref 0–1)
TOXIC GRANULES BLD QL SMEAR: PRESENT
UROBILINOGEN UR STRIP-MCNC: NORMAL MG/DL (ref 0–2)
WBC # BLD AUTO: 18 10E9/L (ref 4–11)
WBC #/AREA URNS AUTO: >182 /HPF (ref 0–5)
WBC CLUMPS #/AREA URNS HPF: PRESENT /HPF

## 2021-02-09 PROCEDURE — 250N000013 HC RX MED GY IP 250 OP 250 PS 637: Performed by: NURSE PRACTITIONER

## 2021-02-09 PROCEDURE — 250N000011 HC RX IP 250 OP 636: Performed by: NURSE PRACTITIONER

## 2021-02-09 PROCEDURE — 87086 URINE CULTURE/COLONY COUNT: CPT | Performed by: FAMILY MEDICINE

## 2021-02-09 PROCEDURE — 250N000013 HC RX MED GY IP 250 OP 250 PS 637: Performed by: FAMILY MEDICINE

## 2021-02-09 PROCEDURE — 87088 URINE BACTERIA CULTURE: CPT | Performed by: FAMILY MEDICINE

## 2021-02-09 PROCEDURE — 258N000003 HC RX IP 258 OP 636: Performed by: FAMILY MEDICINE

## 2021-02-09 PROCEDURE — C1758 CATHETER, URETERAL: HCPCS | Performed by: UROLOGY

## 2021-02-09 PROCEDURE — 999N000180 XR SURGERY CARM FLUORO LESS THAN 5 MIN: Mod: TC

## 2021-02-09 PROCEDURE — 99284 EMERGENCY DEPT VISIT MOD MDM: CPT | Performed by: NURSE PRACTITIONER

## 2021-02-09 PROCEDURE — 87636 SARSCOV2 & INF A&B AMP PRB: CPT | Performed by: NURSE PRACTITIONER

## 2021-02-09 PROCEDURE — 710N000010 HC RECOVERY PHASE 1, LEVEL 2, PER MIN: Performed by: UROLOGY

## 2021-02-09 PROCEDURE — 255N000002 HC RX 255 OP 636: Performed by: RADIOLOGY

## 2021-02-09 PROCEDURE — C1769 GUIDE WIRE: HCPCS | Performed by: UROLOGY

## 2021-02-09 PROCEDURE — 272N000001 HC OR GENERAL SUPPLY STERILE: Performed by: UROLOGY

## 2021-02-09 PROCEDURE — 99285 EMERGENCY DEPT VISIT HI MDM: CPT | Mod: 25

## 2021-02-09 PROCEDURE — 250N000009 HC RX 250: Performed by: NURSE ANESTHETIST, CERTIFIED REGISTERED

## 2021-02-09 PROCEDURE — 74177 CT ABD & PELVIS W/CONTRAST: CPT

## 2021-02-09 PROCEDURE — 84145 PROCALCITONIN (PCT): CPT | Performed by: NURSE PRACTITIONER

## 2021-02-09 PROCEDURE — 120N000001 HC R&B MED SURG/OB

## 2021-02-09 PROCEDURE — 250N000011 HC RX IP 250 OP 636: Performed by: NURSE ANESTHETIST, CERTIFIED REGISTERED

## 2021-02-09 PROCEDURE — 81001 URINALYSIS AUTO W/SCOPE: CPT | Performed by: FAMILY MEDICINE

## 2021-02-09 PROCEDURE — 80053 COMPREHEN METABOLIC PANEL: CPT | Performed by: NURSE PRACTITIONER

## 2021-02-09 PROCEDURE — 99223 1ST HOSP IP/OBS HIGH 75: CPT | Mod: 25 | Performed by: UROLOGY

## 2021-02-09 PROCEDURE — 99140 ANES COMP EMERGENCY COND: CPT | Performed by: NURSE ANESTHETIST, CERTIFIED REGISTERED

## 2021-02-09 PROCEDURE — 87186 SC STD MICRODIL/AGAR DIL: CPT | Performed by: FAMILY MEDICINE

## 2021-02-09 PROCEDURE — C9803 HOPD COVID-19 SPEC COLLECT: HCPCS

## 2021-02-09 PROCEDURE — 258N000003 HC RX IP 258 OP 636: Performed by: NURSE PRACTITIONER

## 2021-02-09 PROCEDURE — 96375 TX/PRO/DX INJ NEW DRUG ADDON: CPT

## 2021-02-09 PROCEDURE — C2617 STENT, NON-COR, TEM W/O DEL: HCPCS | Performed by: UROLOGY

## 2021-02-09 PROCEDURE — 52332 CYSTOSCOPY AND TREATMENT: CPT | Performed by: NURSE ANESTHETIST, CERTIFIED REGISTERED

## 2021-02-09 PROCEDURE — 52332 CYSTOSCOPY AND TREATMENT: CPT | Performed by: UROLOGY

## 2021-02-09 PROCEDURE — 258N000003 HC RX IP 258 OP 636: Performed by: NURSE ANESTHETIST, CERTIFIED REGISTERED

## 2021-02-09 PROCEDURE — 85025 COMPLETE CBC W/AUTO DIFF WBC: CPT | Performed by: NURSE PRACTITIONER

## 2021-02-09 PROCEDURE — 99214 OFFICE O/P EST MOD 30 MIN: CPT | Performed by: FAMILY MEDICINE

## 2021-02-09 PROCEDURE — 255N000002 HC RX 255 OP 636: Performed by: UROLOGY

## 2021-02-09 PROCEDURE — 83605 ASSAY OF LACTIC ACID: CPT | Performed by: NURSE PRACTITIONER

## 2021-02-09 PROCEDURE — 99222 1ST HOSP IP/OBS MODERATE 55: CPT | Mod: AI | Performed by: FAMILY MEDICINE

## 2021-02-09 PROCEDURE — 0T778DZ DILATION OF LEFT URETER WITH INTRALUMINAL DEVICE, VIA NATURAL OR ARTIFICIAL OPENING ENDOSCOPIC: ICD-10-PCS | Performed by: UROLOGY

## 2021-02-09 PROCEDURE — 96365 THER/PROPH/DIAG IV INF INIT: CPT | Mod: XU

## 2021-02-09 PROCEDURE — 87040 BLOOD CULTURE FOR BACTERIA: CPT | Performed by: NURSE PRACTITIONER

## 2021-02-09 PROCEDURE — 36415 COLL VENOUS BLD VENIPUNCTURE: CPT | Performed by: NURSE PRACTITIONER

## 2021-02-09 PROCEDURE — 360N000082 HC SURGERY LEVEL 2 W/ FLUORO, PER MIN: Performed by: UROLOGY

## 2021-02-09 PROCEDURE — 74420 UROGRAPHY RTRGR +-KUB: CPT | Mod: 26 | Performed by: UROLOGY

## 2021-02-09 PROCEDURE — 370N000017 HC ANESTHESIA TECHNICAL FEE, PER MIN: Performed by: UROLOGY

## 2021-02-09 DEVICE — URETERAL STENT
Type: IMPLANTABLE DEVICE | Site: URETHRA | Status: NON-FUNCTIONAL
Brand: CONTOUR™
Removed: 2021-02-19

## 2021-02-09 RX ORDER — NALOXONE HYDROCHLORIDE 0.4 MG/ML
0.2 INJECTION, SOLUTION INTRAMUSCULAR; INTRAVENOUS; SUBCUTANEOUS
Status: DISCONTINUED | OUTPATIENT
Start: 2021-02-09 | End: 2021-02-10

## 2021-02-09 RX ORDER — OXYCODONE AND ACETAMINOPHEN 5; 325 MG/1; MG/1
1-2 TABLET ORAL EVERY 4 HOURS PRN
Status: DISCONTINUED | OUTPATIENT
Start: 2021-02-09 | End: 2021-02-11 | Stop reason: HOSPADM

## 2021-02-09 RX ORDER — LIDOCAINE HYDROCHLORIDE 20 MG/ML
INJECTION, SOLUTION INFILTRATION; PERINEURAL PRN
Status: DISCONTINUED | OUTPATIENT
Start: 2021-02-09 | End: 2021-02-09

## 2021-02-09 RX ORDER — NALOXONE HYDROCHLORIDE 0.4 MG/ML
0.4 INJECTION, SOLUTION INTRAMUSCULAR; INTRAVENOUS; SUBCUTANEOUS
Status: DISCONTINUED | OUTPATIENT
Start: 2021-02-09 | End: 2021-02-11 | Stop reason: HOSPADM

## 2021-02-09 RX ORDER — LIDOCAINE 40 MG/G
CREAM TOPICAL
Status: CANCELLED | OUTPATIENT
Start: 2021-02-09

## 2021-02-09 RX ORDER — ONDANSETRON 2 MG/ML
4 INJECTION INTRAMUSCULAR; INTRAVENOUS EVERY 6 HOURS PRN
Status: DISCONTINUED | OUTPATIENT
Start: 2021-02-09 | End: 2021-02-11 | Stop reason: HOSPADM

## 2021-02-09 RX ORDER — SODIUM CHLORIDE 9 MG/ML
INJECTION, SOLUTION INTRAVENOUS CONTINUOUS
Status: DISCONTINUED | OUTPATIENT
Start: 2021-02-09 | End: 2021-02-09 | Stop reason: HOSPADM

## 2021-02-09 RX ORDER — ONDANSETRON 4 MG/1
4 TABLET, ORALLY DISINTEGRATING ORAL EVERY 6 HOURS PRN
Status: DISCONTINUED | OUTPATIENT
Start: 2021-02-09 | End: 2021-02-11 | Stop reason: HOSPADM

## 2021-02-09 RX ORDER — HYDROMORPHONE HYDROCHLORIDE 1 MG/ML
.3-.5 INJECTION, SOLUTION INTRAMUSCULAR; INTRAVENOUS; SUBCUTANEOUS EVERY 5 MIN PRN
Status: DISCONTINUED | OUTPATIENT
Start: 2021-02-09 | End: 2021-02-09 | Stop reason: HOSPADM

## 2021-02-09 RX ORDER — FENTANYL CITRATE 50 UG/ML
25-50 INJECTION, SOLUTION INTRAMUSCULAR; INTRAVENOUS
Status: DISCONTINUED | OUTPATIENT
Start: 2021-02-09 | End: 2021-02-09 | Stop reason: HOSPADM

## 2021-02-09 RX ORDER — ONDANSETRON 2 MG/ML
4 INJECTION INTRAMUSCULAR; INTRAVENOUS EVERY 8 HOURS PRN
Status: DISCONTINUED | OUTPATIENT
Start: 2021-02-09 | End: 2021-02-09 | Stop reason: HOSPADM

## 2021-02-09 RX ORDER — PROPOFOL 10 MG/ML
INJECTION, EMULSION INTRAVENOUS PRN
Status: DISCONTINUED | OUTPATIENT
Start: 2021-02-09 | End: 2021-02-09

## 2021-02-09 RX ORDER — ACETAMINOPHEN 325 MG/1
650 TABLET ORAL EVERY 4 HOURS PRN
Status: DISCONTINUED | OUTPATIENT
Start: 2021-02-09 | End: 2021-02-11 | Stop reason: HOSPADM

## 2021-02-09 RX ORDER — SODIUM CHLORIDE 9 MG/ML
INJECTION, SOLUTION INTRAVENOUS CONTINUOUS
Status: CANCELLED | OUTPATIENT
Start: 2021-02-09

## 2021-02-09 RX ORDER — IOPAMIDOL 612 MG/ML
100 INJECTION, SOLUTION INTRAVASCULAR ONCE
Status: COMPLETED | OUTPATIENT
Start: 2021-02-09 | End: 2021-02-09

## 2021-02-09 RX ORDER — NALOXONE HYDROCHLORIDE 0.4 MG/ML
0.4 INJECTION, SOLUTION INTRAMUSCULAR; INTRAVENOUS; SUBCUTANEOUS
Status: DISCONTINUED | OUTPATIENT
Start: 2021-02-09 | End: 2021-02-10

## 2021-02-09 RX ORDER — PROPOFOL 10 MG/ML
INJECTION, EMULSION INTRAVENOUS CONTINUOUS PRN
Status: DISCONTINUED | OUTPATIENT
Start: 2021-02-09 | End: 2021-02-09

## 2021-02-09 RX ORDER — ONDANSETRON 2 MG/ML
4 INJECTION INTRAMUSCULAR; INTRAVENOUS EVERY 30 MIN PRN
Status: DISCONTINUED | OUTPATIENT
Start: 2021-02-09 | End: 2021-02-09 | Stop reason: HOSPADM

## 2021-02-09 RX ORDER — LIDOCAINE 40 MG/G
CREAM TOPICAL
Status: DISCONTINUED | OUTPATIENT
Start: 2021-02-09 | End: 2021-02-11 | Stop reason: HOSPADM

## 2021-02-09 RX ORDER — CEFTRIAXONE SODIUM 2 G/50ML
2 INJECTION, SOLUTION INTRAVENOUS ONCE
Status: COMPLETED | OUTPATIENT
Start: 2021-02-09 | End: 2021-02-09

## 2021-02-09 RX ORDER — NALOXONE HYDROCHLORIDE 0.4 MG/ML
0.2 INJECTION, SOLUTION INTRAMUSCULAR; INTRAVENOUS; SUBCUTANEOUS
Status: DISCONTINUED | OUTPATIENT
Start: 2021-02-09 | End: 2021-02-11 | Stop reason: HOSPADM

## 2021-02-09 RX ORDER — ACETAMINOPHEN 325 MG/1
975 TABLET ORAL ONCE
Status: COMPLETED | OUTPATIENT
Start: 2021-02-09 | End: 2021-02-09

## 2021-02-09 RX ORDER — SODIUM CHLORIDE 9 MG/ML
INJECTION, SOLUTION INTRAVENOUS CONTINUOUS
Status: DISCONTINUED | OUTPATIENT
Start: 2021-02-09 | End: 2021-02-11

## 2021-02-09 RX ORDER — ONDANSETRON 4 MG/1
4 TABLET, ORALLY DISINTEGRATING ORAL EVERY 30 MIN PRN
Status: DISCONTINUED | OUTPATIENT
Start: 2021-02-09 | End: 2021-02-09 | Stop reason: HOSPADM

## 2021-02-09 RX ADMIN — PROPOFOL 50 MG: 10 INJECTION, EMULSION INTRAVENOUS at 18:48

## 2021-02-09 RX ADMIN — PROPOFOL 75 MCG/KG/MIN: 10 INJECTION, EMULSION INTRAVENOUS at 18:35

## 2021-02-09 RX ADMIN — ONDANSETRON 4 MG: 2 INJECTION INTRAMUSCULAR; INTRAVENOUS at 13:22

## 2021-02-09 RX ADMIN — FENTANYL CITRATE 50 MCG: 50 INJECTION, SOLUTION INTRAMUSCULAR; INTRAVENOUS at 19:14

## 2021-02-09 RX ADMIN — PROPOFOL 50 MG: 10 INJECTION, EMULSION INTRAVENOUS at 18:42

## 2021-02-09 RX ADMIN — IOPAMIDOL 100 ML: 612 INJECTION, SOLUTION INTRAVENOUS at 14:17

## 2021-02-09 RX ADMIN — ACETAMINOPHEN 650 MG: 325 TABLET, FILM COATED ORAL at 20:00

## 2021-02-09 RX ADMIN — LIDOCAINE HYDROCHLORIDE 60 MG: 20 INJECTION, SOLUTION INFILTRATION; PERINEURAL at 18:35

## 2021-02-09 RX ADMIN — SODIUM CHLORIDE: 9 INJECTION, SOLUTION INTRAVENOUS at 17:39

## 2021-02-09 RX ADMIN — SODIUM CHLORIDE: 9 INJECTION, SOLUTION INTRAVENOUS at 19:21

## 2021-02-09 RX ADMIN — PROPOFOL 50 MG: 10 INJECTION, EMULSION INTRAVENOUS at 18:36

## 2021-02-09 RX ADMIN — PROPOFOL 50 MG: 10 INJECTION, EMULSION INTRAVENOUS at 18:35

## 2021-02-09 RX ADMIN — CEFTRIAXONE SODIUM 2 G: 2 INJECTION, SOLUTION INTRAVENOUS at 13:30

## 2021-02-09 RX ADMIN — ACETAMINOPHEN 975 MG: 325 TABLET, FILM COATED ORAL at 13:25

## 2021-02-09 RX ADMIN — SODIUM CHLORIDE 500 ML: 9 INJECTION, SOLUTION INTRAVENOUS at 13:29

## 2021-02-09 ASSESSMENT — ENCOUNTER SYMPTOMS
WOUND: 0
LIGHT-HEADEDNESS: 0
RHINORRHEA: 0
APPETITE CHANGE: 1
COLOR CHANGE: 0
CONSTIPATION: 1
SORE THROAT: 0
HEMATURIA: 0
FLANK PAIN: 1
FATIGUE: 1
PALPITATIONS: 0
BLOOD IN STOOL: 0
DIZZINESS: 0
ABDOMINAL PAIN: 1
DYSURIA: 1
COUGH: 0
NAUSEA: 0
FEVER: 1
DIARRHEA: 0
CHILLS: 1
VOMITING: 0
SHORTNESS OF BREATH: 0
HEADACHES: 0
FREQUENCY: 0

## 2021-02-09 ASSESSMENT — MIFFLIN-ST. JEOR: SCORE: 1783.96

## 2021-02-09 ASSESSMENT — LIFESTYLE VARIABLES: TOBACCO_USE: 1

## 2021-02-09 ASSESSMENT — PAIN SCALES - GENERAL: PAINLEVEL: NO PAIN (0)

## 2021-02-09 ASSESSMENT — ACTIVITIES OF DAILY LIVING (ADL): ADLS_ACUITY_SCORE: 15

## 2021-02-09 NOTE — PROGRESS NOTES
"    Assessment & Plan     Pyelonephritis, acute  Rule out sepsis  Discussed with ER staff, Felipa, he is taken there for further evaluation and treatment to rule out sepsis      Review of the result(s) of each unique test - UA  20 minutes spent on the date of the encounter doing chart review, review of test results, interpretation of tests, patient visit, documentation and discussion with other provider(s)        BMI:   Estimated body mass index is 36.57 kg/m  as calculated from the following:    Height as of this encounter: 1.689 m (5' 6.5\").    Weight as of this encounter: 104.3 kg (230 lb).           No follow-ups on file.    Alison Duffy MD  [facility] - ANDREW Lilly is a 65 year old who presents for the following health issues     HPI       Genitourinary - Male  Onset/Duration: 2 weeks  Description:   Dysuria (painful urination): YES}  Hematuria (blood in urine): no  Frequency: YES  Waking at night to urinate: YES  Hesitancy (delay in urine): no  Retention (unable to empty): no  Decrease in urinary flow: no  Incontinence: no  Progression of Symptoms:  worsening  Accompanying Signs & Symptoms:  Fever: YES  Back/Flank pain: YES  Urethral discharge: no  Testicle lumps/masses/pain: no  Nausea and/or vomiting: no  Abdominal pain: no  History:   History of frequent UTI s: YES  History of kidney stones: YES  History of hernias: no  Personal or Family history of Prostate problems: no  Sexually active: YES  Precipitating or alleviating factors: None  Therapies tried and outcome: tried AZO     Symptoms for two weeks, with urgency, dysuria. Tried Azo for 2 days but symptoms returned. Now with fever, chills, left flank pain        Review of Systems   Constitutional, HEENT, cardiovascular, pulmonary, gi and gu systems are negative, except as otherwise noted.      Objective    Ht 1.689 m (5' 6.5\")   Wt 104.3 kg (230 lb)   BMI 36.57 kg/m    Body mass index is 36.57 kg/m .  Physical Exam "   GENERAL: fatigued , alert and no distress  EYES: Eyes grossly normal to inspection, PERRL and conjunctivae and sclerae normal  HENT: ear canals and TM's normal, nose and mouth without ulcers or lesions  NECK: no adenopathy, no asymmetry, masses, or scars and thyroid normal to palpation  RESP: lungs clear to auscultation - no rales, rhonchi or wheezes  CV: regular rate and rhythm, normal S1 S2, no S3 or S4, no murmur, click or rub, no peripheral edema and peripheral pulses strong  ABDOMEN: soft, nontender, no hepatosplenomegaly, no masses and bowel sounds normal  MS: no gross musculoskeletal defects noted, no edema  MS: left flank tenderness  NEURO: Normal strength and tone, mentation intact and speech normal  PSYCH: mentation appears normal and fatigued

## 2021-02-09 NOTE — ANESTHESIA PREPROCEDURE EVALUATION
Anesthesia Pre-Procedure Evaluation    Patient: Maxx Canales   MRN: [id number] : 1955        Preoperative Diagnosis: Acute pyelonephritis [N10]  Sepsis without acute organ dysfunction, due to unspecified organism (H) [A41.9]   Procedure : Procedure(s):  Left retrograde pyelogram with stent placement     Past Medical History:   Diagnosis Date     Class 1 obesity with serious comorbidity and body mass index (BMI) of 33.0 to 33.9 in adult, unspecified obesity type 2017     Diverticulitis of colon 10/10/2013     Dysmetabolic syndrome X 10/3/2007     Erectile dysfunction, unspecified erectile dysfunction type 10/2/2018     Essential hypertension 2003    Overview:  IMO Update     Partially resolved traumatic cataract of right eye 2018     Primary localized osteoarthrosis, lower leg 2013     Pure hypercholesterolemia 2003     Thalassemia 10/3/2007      Past Surgical History:   Procedure Laterality Date     ABDOMEN SURGERY      sigmoid resection, diverticulitis     COLONOSCOPY  10/21/2013    Procedure: COLONOSCOPY;  COLONOSCOPY with biopsy/ polypectomy;  Surgeon: Carisa Brito DO;  Location: [facility]     COLONOSCOPY N/A 10/4/2018    Procedure: COLONOSCOPY;  COLONOSCOPY WITH POLYPECTOMY;  Surgeon: Mj Parsons MD;  Location: [facility] OR     EYE SURGERY  2018    right     IR RENAL STONE REMOVAL RIGHT  2018    8 cm     VASECTOMY        Allergies   Allergen Reactions     Atorvastatin       Social History     Tobacco Use     Smoking status: Former Smoker     Types: Cigarettes     Quit date: 1999     Years since quittin.1     Smokeless tobacco: Former User     Types: Chew     Tobacco comment: occasional cigar   Substance Use Topics     Alcohol use: Yes     Frequency: 4 or more times a week     Drinks per session: 1 or 2     Comment: 3x week      Wt Readings from Last 1 Encounters:   21 104.3 kg (230 lb)        Anesthesia Evaluation   Pt has had prior anesthetic.     No  history of anesthetic complications       ROS/MED HX  ENT/Pulmonary:     (+) tobacco use, Past use,     Neurologic:  - neg neurologic ROS     Cardiovascular:     (+) Dyslipidemia hypertension-----    METS/Exercise Tolerance: >4 METS    Hematologic: Comments: Hx of thalassemia      Musculoskeletal:  - neg musculoskeletal ROS     GI/Hepatic:  - neg GI/hepatic ROS     Renal/Genitourinary:     (+) Nephrolithiasis ,     Endo:     (+) Obesity,     Psychiatric/Substance Use:  - neg psychiatric ROS     Infectious Disease:     (+) Recent Fever,     Malignancy:  - neg malignancy ROS     Other:  - neg other ROS          Physical Exam    Airway        Mallampati: II   TM distance: > 3 FB   Neck ROM: full   Mouth opening: > 3 cm    Respiratory Devices and Support         Dental  no notable dental history         Cardiovascular   cardiovascular exam normal       Rhythm and rate: regular and tachycardia     Pulmonary   pulmonary exam normal        breath sounds clear to auscultation           OUTSIDE LABS:  CBC:   Lab Results   Component Value Date    WBC 18.0 (H) 02/09/2021    WBC 8.2 11/09/2020    HGB 11.6 (L) 02/09/2021    HGB 12.6 (L) 11/09/2020    HCT 35.6 (L) 02/09/2021    HCT 39.4 (L) 11/09/2020     02/09/2021     11/09/2020     BMP:   Lab Results   Component Value Date     02/09/2021     11/09/2020    POTASSIUM 3.9 02/09/2021    POTASSIUM 4.4 11/09/2020    CHLORIDE 102 02/09/2021    CHLORIDE 107 11/09/2020    CO2 24 02/09/2021    CO2 26 11/09/2020    BUN 12 02/09/2021    BUN 18 11/09/2020    CR 1.01 02/09/2021    CR 0.85 11/09/2020     (H) 02/09/2021     (H) 11/09/2020     COAGS: No results found for: PTT, INR, FIBR  POC: No results found for: BGM, HCG, HCGS  HEPATIC:   Lab Results   Component Value Date    ALBUMIN 3.6 02/09/2021    PROTTOTAL 7.8 02/09/2021    ALT 25 02/09/2021    AST 17 02/09/2021    ALKPHOS 79 02/09/2021    BILITOTAL 1.0 02/09/2021     OTHER:   Lab Results    Component Value Date    LACT 1.7 02/09/2021    A1C 5.9 (H) 11/09/2020    BABAR 9.0 02/09/2021    CRP <2.9 05/14/2020       Anesthesia Plan    ASA Status:  2, emergent    NPO Status:  NPO Appropriate    Anesthesia Type: MAC.   Induction: Propofol.           Consents    Anesthesia Plan(s) and associated risks, benefits, and realistic alternatives discussed. Questions answered and patient/representative(s) expressed understanding.     - Discussed with:  Patient      - Extended Intubation/Ventilatory Support Discussed: no Extended Intubation.      - Patient is DNR/DNI Status: No    Use of blood products discussed: Yes.     - Discussed with: Patient.     - Consented: consented to blood products     Postoperative Care            Comments:                SEPIDEH VASQUEZ CRNA

## 2021-02-09 NOTE — ED PROVIDER NOTES
"  History     Chief Complaint   Patient presents with     Flank Pain     lt flank pain x 2 weeks, notes taking AZO for uti symptoms. temp 102.2 in triage     HPI     Maxx Canales is a 65 year old male who presents from clinic for evaluation of possible pyelonephrosis and sepsis. Appointment in the clinic today for left flank pain on going x 2 weeks. He was taking Azo and symptoms improved. 2-3 days ago left flank pain returned. This morning left flank pain was severe but currently rates at 0/10. He has not taken anything for discomfort. Associated fever, chills started this morning. He has had some abdominal discomfort the last couple days \"because I'm all bound up.\" Usual bowel pattern is 1-2 times daily. Last BM was yesterday - small amount. Took Dulcolax this morning and on Saturday. Denies nausea, vomiting. He does endorse history of renal calculi requiring surgical intervention.       Denies upper respiratory symptoms, cough, dyspnea, chest pain, palpitations, generalized body aches, headache, new rashes.     No known exposures including COVID.         Allergies:  Allergies   Allergen Reactions     Atorvastatin        Problem List:    Patient Active Problem List    Diagnosis Date Noted     Acute pyelonephritis 02/09/2021     Priority: Medium     Added automatically from request for surgery [id number]       Sepsis without acute organ dysfunction, due to unspecified organism (H) 02/09/2021     Priority: Medium     Added automatically from request for surgery [id number]       Elevated red blood cell count 11/09/2020     Priority: Medium     Erectile dysfunction, unspecified erectile dysfunction type 10/02/2018     Priority: Medium     Partially resolved traumatic cataract of right eye 01/18/2018     Priority: Medium     Class 1 obesity with serious comorbidity and body mass index (BMI) of 33.0 to 33.9 in adult, unspecified obesity type 09/28/2017     Priority: Medium     Diverticulitis of colon 10/10/2013     " Priority: Medium     Primary localized osteoarthrosis, lower leg 2013     Priority: Medium     Dysmetabolic syndrome X 10/03/2007     Priority: Medium     Thalassemia 10/03/2007     Priority: Medium     Pure hypercholesterolemia 2003     Priority: Medium     Essential hypertension 2003     Priority: Medium     Overview:   IMO Update          Past Medical History:    Past Medical History:   Diagnosis Date     Class 1 obesity with serious comorbidity and body mass index (BMI) of 33.0 to 33.9 in adult, unspecified obesity type 2017     Diverticulitis of colon 10/10/2013     Dysmetabolic syndrome X 10/3/2007     Erectile dysfunction, unspecified erectile dysfunction type 10/2/2018     Essential hypertension 2003     Partially resolved traumatic cataract of right eye 2018     Primary localized osteoarthrosis, lower leg 2013     Pure hypercholesterolemia 2003     Thalassemia 10/3/2007       Past Surgical History:    Past Surgical History:   Procedure Laterality Date     ABDOMEN SURGERY      sigmoid resection, diverticulitis     COLONOSCOPY  10/21/2013    Procedure: COLONOSCOPY;  COLONOSCOPY with biopsy/ polypectomy;  Surgeon: Carisa Brito DO;  Location: [facility]     COLONOSCOPY N/A 10/4/2018    Procedure: COLONOSCOPY;  COLONOSCOPY WITH POLYPECTOMY;  Surgeon: Mj Parsons MD;  Location: [facility]     EYE SURGERY  2018    right     IR RENAL STONE REMOVAL RIGHT  2018    8 cm     VASECTOMY         Family History:    Family History   Problem Relation Age of Onset     Diabetes Mother         healthy in 90s     Heart Disease Father 49        5 MIs,  of MI       Social History:  Marital Status:   [2]  Social History     Tobacco Use     Smoking status: Former Smoker     Types: Cigarettes     Quit date: 1999     Years since quittin.1     Smokeless tobacco: Former User     Types: Chew     Tobacco comment: occasional cigar   Substance Use Topics     Alcohol use: Yes      Frequency: 4 or more times a week     Drinks per session: 1 or 2     Comment: 3x week     Drug use: No        Medications:         ASPIRIN PO       cholecalciferol (VITAMIN D3) 5000 units (125 mcg) capsule       lisinopril (ZESTRIL) 40 MG tablet       Multiple Vitamins-Minerals (MULTIVITAMIN OR)       simvastatin (ZOCOR) 80 MG tablet          Review of Systems   Constitutional: Positive for appetite change (decreased), chills, fatigue and fever.   HENT: Negative for congestion, ear pain, rhinorrhea and sore throat.    Respiratory: Negative for cough and shortness of breath.    Cardiovascular: Negative for chest pain and palpitations.   Gastrointestinal: Positive for abdominal pain and constipation. Negative for blood in stool, diarrhea, nausea and vomiting.   Genitourinary: Positive for dysuria and flank pain (left). Negative for frequency and hematuria.   Musculoskeletal:        Denies generalized body aches   Skin: Negative for color change, rash and wound.   Neurological: Negative for dizziness, light-headedness and headaches.       Physical Exam   BP: 154/87  Pulse: 116  Temp: (!) 102.2  F (39  C)  Resp: 16  SpO2: 96 %      Physical Exam  Constitutional:       General: He is not in acute distress.     Appearance: He is not toxic-appearing.   HENT:      Mouth/Throat:      Lips: Pink.      Mouth: Mucous membranes are moist.      Pharynx: Oropharynx is clear. Uvula midline.   Neck:      Musculoskeletal: Neck supple.   Cardiovascular:      Rate and Rhythm: Normal rate and regular rhythm.      Heart sounds: S1 normal and S2 normal. No murmur. No friction rub. No gallop.    Pulmonary:      Effort: Pulmonary effort is normal.      Breath sounds: Normal breath sounds. No wheezing, rhonchi or rales.   Abdominal:      General: Bowel sounds are normal. There is no distension.      Palpations: Abdomen is soft.      Tenderness: There is no abdominal tenderness. There is no right CVA tenderness, left CVA tenderness,  guarding or rebound.   Musculoskeletal:      Right lower leg: No edema.      Left lower leg: No edema.      Comments: FROM of upper and lower extremities   Lymphadenopathy:      Cervical: No cervical adenopathy.   Skin:     General: Skin is warm and dry.      Capillary Refill: Capillary refill takes less than 2 seconds.   Neurological:      Mental Status: He is alert and oriented to person, place, and time.      Coordination: Coordination is intact.      Gait: Gait is intact.   Psychiatric:         Mood and Affect: Mood normal.         Speech: Speech normal.         Behavior: Behavior normal. Behavior is cooperative.         ED Course     ED Course as of Feb 09 1500   Tue Feb 09, 2021   1434 Consulted with hospitalist regarding potential admission. Will await CT results to re-consult.  Felipa Ortiz CNP on 2/9/2021 at 2:34 PM        1444 Consulted with on-call urologist, Dr. Reeves, regarding HPI, exam, labs and imaging. Plan for stent placement.   Will call [facility] transfer line to consult with hospitalist for transfer.  Felipa Ortiz CNP on 2/9/2021 at 2:46 PM        1455 Patient updated on all results and need for transfer to have urology evaluation/intervention. He declines ambulance transfer and initially states he will drive there. Discussed given how sick he is he should not drive himself. He is planning to call his wife.   Felipa Ortiz CNP on 2/9/2021 at 2:57 PM        1458 Consulted with hospitalist Dr. Bey at [facility] who accepts patient for transfer.  Felipa Ortiz CNP on 2/9/2021 at 2:58 PM          Procedures      Results for orders placed or performed during the hospital encounter of 02/09/21 (from the past 24 hour(s))   CBC with platelets differential   Result Value Ref Range    WBC 18.0 (H) 4.0 - 11.0 10e9/L    RBC Count 5.73 4.4 - 5.9 10e12/L    Hemoglobin 11.6 (L) 13.3 - 17.7 g/dL    Hematocrit 35.6 (L) 40.0 - 53.0 %    MCV 62 (L) 78 - 100 fl    MCH 20.2 (L) 26.5 - 33.0 pg    MCHC 32.6  31.5 - 36.5 g/dL    RDW 15.7 (H) 10.0 - 15.0 %    Platelet Count 212 150 - 450 10e9/L    Diff Method Automated Method     % Neutrophils 76.3 %    % Lymphocytes 9.2 %    % Monocytes 13.4 %    % Eosinophils 0.0 %    % Basophils 0.2 %    % Immature Granulocytes 0.9 %    Nucleated RBCs 0 0 /100    Absolute Neutrophil 13.7 (H) 1.6 - 8.3 10e9/L    Absolute Lymphocytes 1.7 0.8 - 5.3 10e9/L    Absolute Monocytes 2.4 (H) 0.0 - 1.3 10e9/L    Absolute Eosinophils 0.0 0.0 - 0.7 10e9/L    Absolute Basophils 0.0 0.0 - 0.2 10e9/L    Abs Immature Granulocytes 0.2 0 - 0.4 10e9/L    Absolute Nucleated RBC 0.0     Anisocytosis Slight     Poikilocytosis Slight     Hypochromasia Present     Toxic Granulation Present     RBC Morphology Consistent with reported results     Platelet Estimate       Automated count confirmed.  Platelet morphology is normal.   Comprehensive metabolic panel   Result Value Ref Range    Sodium 133 133 - 144 mmol/L    Potassium 3.9 3.4 - 5.3 mmol/L    Chloride 102 94 - 109 mmol/L    Carbon Dioxide 24 20 - 32 mmol/L    Anion Gap 7 3 - 14 mmol/L    Glucose 131 (H) 70 - 99 mg/dL    Urea Nitrogen 12 7 - 30 mg/dL    Creatinine 1.01 0.66 - 1.25 mg/dL    GFR Estimate 78 >60 mL/min/[1.73_m2]    GFR Estimate If Black >90 >60 mL/min/[1.73_m2]    Calcium 9.0 8.5 - 10.1 mg/dL    Bilirubin Total 1.0 0.2 - 1.3 mg/dL    Albumin 3.6 3.4 - 5.0 g/dL    Protein Total 7.8 6.8 - 8.8 g/dL    Alkaline Phosphatase 79 40 - 150 U/L    ALT 25 0 - 70 U/L    AST 17 0 - 45 U/L   Lactic acid whole blood   Result Value Ref Range    Lactic Acid 1.7 0.7 - 2.0 mmol/L   Blood culture    Specimen: Blood   Result Value Ref Range    Specimen Description Blood     Culture Micro No growth after 1 hour    Procalcitonin   Result Value Ref Range    Procalcitonin 0.11 ng/ml   Symptomatic Influenza A/B & SARS-CoV2 (COVID-19) Virus PCR Multiplex    Specimen: Nasopharyngeal   Result Value Ref Range    Flu A/B & SARS-COV-2 PCR Source Nasopharyngeal      SARS-CoV-2 PCR Result NEGATIVE     Influenza A PCR Negative NEG^Negative    Influenza B PCR Negative NEG^Negative    Respiratory Syncytial Virus PCR Negative NEG^Negative    Flu A/B & SARS-CoV-2 PCR Comment       Testing was performed using the Xpert Xpress SARS-CoV2/Flu/RSV Assay on the [employer]   GeneXpert Instrument. Additional information about the Emergency Use Authorization (EUA)   assay can be found via the Lab Guide.     CT Abdomen Pelvis w Contrast    Narrative    Exam:CT ABDOMEN PELVIS W CONTRAST    History: 65 years Male Abdominal pain, acute, nonlocalized; Flank  pain, recurrent stone disease suspected - left; sepsis, pyelonephritis  versus abscess versus infected stone, new constiaption     Comparisons:    Technique: Axial CT imaging of the abdomen and pelvis was performed  with intervenous contrast. Coronal and sagittal reconstructions were  obtained      FINDINGS:  Lung bases:The lung bases are clear    Abdomen:  Liver:Unremarkable  Gallbladder and biliary tree:No calcified gallstones are present.  There is no evidence of biliary dilatation.  Pancreas:Unremarkable  Spleen:Unremarkable  Adrenals:Normal    Kidneys and ureters: There is left-sided hydronephrosis and  hydroureter. There is a distal left ureteral calculus measuring 8 x 6  x 10 mm in diameter. The calculus is approximately 2 cm above the  ureterovesical junction. There is left-sided perinephric fat  stranding.    There are 4 calculi at the upper pole of the left kidney. The largest  is 7 mm in diameter. There are 4 very small calculi in the right  kidney, largest is approximately 2 mm.    Lymph nodes:There is no significant lymphadenopathy    Bowel:No abnormally distended or thickened loops of bowel are present.  There is no evidence of bowel obstruction.    Appendix: Small appendicoliths are present. The appendix is otherwise  normal.    Vessels:Unremarkable    Osseous structures: There is degenerative change of the lumbar  spine.    Pelvis:There is no evidence of mass or lymphadenopathy. No abnormal  fluid collections are present.            Impression    IMPRESSION: There is a large distal left ureteral calculus measuring 6  x 8 x 10 mm in diameter position 2 cm above the ureterovesical  junction with left-sided hydroureter and hydronephrosis.    Smaller nonobstructing calculi are present bilaterally.    JANNETH PEARL MD       Medications   0.9% sodium chloride BOLUS (500 mLs Intravenous New Bag 2/9/21 1329)     Followed by   sodium chloride 0.9% infusion (has no administration in time range)   ondansetron (ZOFRAN) injection 4 mg (4 mg Intravenous Given 2/9/21 1322)   cefTRIAXone IN D5W (ROCEPHIN) intermittent infusion 2 g (2 g Intravenous New Bag 2/9/21 1330)   acetaminophen (TYLENOL) tablet 975 mg (975 mg Oral Given 2/9/21 1325)   sodium chloride (PF) 0.9% PF flush 60 mL (60 mLs Intravenous Given 2/9/21 1417)   iopamidol (ISOVUE-300) IV solution 61% 100 mL (100 mLs Intravenous Given 2/9/21 1417)       Assessments & Plan (with Medical Decision Making)     I have reviewed the nursing notes.    I have reviewed the findings, diagnosis, plan and need for follow up with the patient.  (N10) Acute pyelonephritis  (primary encounter diagnosis)  (A41.9) Sepsis without acute organ dysfunction, due to unspecified organism (H)  (Q62.11) Hydronephrosis with ureteropelvic junction (UPJ) obstruction  Pleasant, ill but nontoxic appearing 65 year old male presents ambulatory from the clinic for further evaluation of pyelonephritis and possible sepsis. Flank pain x 2 weeks, improved with Azo and returned about 2-3 days ago. History of renal calculi and was having severe left flank pain this morning which precipitated clinic visit. Upon arrival to ED flank pain improved but he is ill-appearing with VS concerning for sepsis. Patient meets sepsis criteria +febrile 102.2, tachycardia 116, WBC 18,000.  He is treated in the ED with IV fluid bolus, IV  rocephin, acetaminophen. Given his history of renal calculi and recent change in bowel habits with intermittent abdominal pain - imaging obtained - obstructing stone with left sided hydroureter and hydronephrosis. Consults as above. Plan for transfer to [facility] for urology evaluation/managment.      Felipa Ortiz CNP           New Prescriptions    No medications on file       Final diagnoses:   Acute pyelonephritis   Sepsis without acute organ dysfunction, due to unspecified organism (H)   Hydronephrosis with ureteropelvic junction (UPJ) obstruction       2/9/2021   [facility]     Felipa Ortiz CNP  02/09/21 1500

## 2021-02-09 NOTE — PROGRESS NOTES
[facility] [place] ADMISSION NOTE    Patient admitted to room 313 at approximately 1700 via ambulation from [facility]- direct admit. Patient was accompanied by spouse.     Verbal SBAR report received from DAREN Hartley  prior to patient arrival.     Patient ambulated to bed independently. Patient alert and oriented X 3. The patient is not having any pain.  . Admission vital signs: Blood pressure (!) 160/70, pulse 110, temperature 100.4  F (38  C), temperature source Tympanic, resp. rate 22, SpO2 98 %. Patient was oriented to plan of care, call light, bed controls, tv, telephone, bathroom and visiting hours.         Marisel Blue

## 2021-02-09 NOTE — TELEPHONE ENCOUNTER
"    Additional Information    Negative: Shock suspected (e.g., cold/pale/clammy skin, too weak to stand, low BP, rapid pulse)    Negative: Sounds like a life-threatening emergency to the triager    Negative: Followed a genital area injury    Negative: Followed a genital area injury (penis, scrotum)    Negative: Vaginal discharge    Negative: Pus (white, yellow) or bloody discharge from end of penis    Negative: [1] Taking antibiotic for urinary tract infection (UTI) AND [2] female    Negative: [1] Taking antibiotic for urinary tract infection (UTI) AND [2] male    Negative: [1] Discomfort (pain, burning or stinging) when passing urine AND [2] pregnant    Negative: [1] Discomfort (pain, burning or stinging) when passing urine AND [2] postpartum (from 0 to 6 weeks after delivery)    Negative: [1] Discomfort (pain, burning or stinging) when passing urine AND [2] female    Negative: [1] Discomfort (pain, burning or stinging) when passing urine AND [2] male    Negative: Pain or itching in the vulvar area    Negative: Pain in scrotum is main symptom    Negative: Blood in the urine is main symptom    Negative: Symptoms arising from use of a urinary catheter (Calvillo or Coude)    Negative: [1] Unable to urinate (or only a few drops) > 4 hours AND     [2] bladder feels very full (e.g., palpable bladder or strong urge to urinate)    Negative: [1] Decreased urination and [2] drinking very little AND [2] dehydration suspected (e.g., dark urine, no urine > 12 hours, very dry mouth, very lightheaded)    Negative: Patient sounds very sick or weak to the triager    Fever > 100.5 F (38.1 C)    Answer Assessment - Initial Assessment Questions  1. SYMPTOM: \"What's the main symptom you're concerned about?\" (e.g., frequency, incontinence)      Urgency, dysuria, incontinence  2. ONSET: \"When did the  symptoms  start?\"      Last week  3. PAIN: \"Is there any pain?\" If so, ask: \"How bad is it?\" (Scale: 1-10; mild, moderate, severe)      Last " "week- yes, currently very mild  4. CAUSE: \"What do you think is causing the symptoms?\"      UTI?  5. OTHER SYMPTOMS: \"Do you have any other symptoms?\" (e.g., fever, flank pain, blood in urine, pain with urination)      Fever- 101.2, L flank pain, no hematuria  6. PREGNANCY: \"Is there any chance you are pregnant?\" \"When was your last menstrual period?\"      no    Protocols used: URINARY SYMPTOMS-A-AH      "

## 2021-02-09 NOTE — ED NOTES
Wife here, patient being discharged from our ER will take private vehicle to [facility].  No questions voiced.  Transfer paperwork with patient to take to [facility].

## 2021-02-09 NOTE — NURSING NOTE
"Chief Complaint   Patient presents with     UTI       Initial /72   Pulse 117   Temp 103.1  F (39.5  C) (Tympanic)   Resp 19   Ht 1.689 m (5' 6.5\")   Wt 104.3 kg (230 lb)   SpO2 94%   BMI 36.57 kg/m   Estimated body mass index is 36.57 kg/m  as calculated from the following:    Height as of this encounter: 1.689 m (5' 6.5\").    Weight as of this encounter: 104.3 kg (230 lb).  Medication Reconciliation: complete  Shell Rinaldi LPN    "

## 2021-02-09 NOTE — DISCHARGE INSTRUCTIONS

## 2021-02-10 ENCOUNTER — APPOINTMENT (OUTPATIENT)
Dept: GENERAL RADIOLOGY | Facility: OTHER | Age: 66
End: 2021-02-10
Attending: FAMILY MEDICINE
Payer: COMMERCIAL

## 2021-02-10 LAB
ANION GAP SERPL CALCULATED.3IONS-SCNC: 10 MMOL/L (ref 3–14)
BUN SERPL-MCNC: 12 MG/DL (ref 7–25)
CALCIUM SERPL-MCNC: 8.1 MG/DL (ref 8.6–10.3)
CHLORIDE SERPL-SCNC: 104 MMOL/L (ref 98–107)
CO2 SERPL-SCNC: 23 MMOL/L (ref 21–31)
CREAT SERPL-MCNC: 1.04 MG/DL (ref 0.7–1.3)
ERYTHROCYTE [DISTWIDTH] IN BLOOD BY AUTOMATED COUNT: 15.6 % (ref 10–15)
GFR SERPL CREATININE-BSD FRML MDRD: 72 ML/MIN/{1.73_M2}
GLUCOSE SERPL-MCNC: 151 MG/DL (ref 70–105)
HCT VFR BLD AUTO: 31.1 % (ref 40–53)
HGB BLD-MCNC: 9.9 G/DL (ref 13.3–17.7)
LACTATE BLD-SCNC: 0.9 MMOL/L (ref 0.7–2)
LACTATE BLD-SCNC: 1.6 MMOL/L (ref 0.7–2)
MCH RBC QN AUTO: 19.9 PG (ref 26.5–33)
MCHC RBC AUTO-ENTMCNC: 31.8 G/DL (ref 31.5–36.5)
MCV RBC AUTO: 62 FL (ref 78–100)
PLATELET # BLD AUTO: 154 10E9/L (ref 150–450)
POTASSIUM SERPL-SCNC: 3.6 MMOL/L (ref 3.5–5.1)
PROCALCITONIN SERPL-MCNC: 0.61 NG/ML
RBC # BLD AUTO: 4.98 10E12/L (ref 4.4–5.9)
SODIUM SERPL-SCNC: 137 MMOL/L (ref 134–144)
WBC # BLD AUTO: 15 10E9/L (ref 4–11)

## 2021-02-10 PROCEDURE — 84145 PROCALCITONIN (PCT): CPT | Performed by: FAMILY MEDICINE

## 2021-02-10 PROCEDURE — 258N000003 HC RX IP 258 OP 636: Performed by: FAMILY MEDICINE

## 2021-02-10 PROCEDURE — 87040 BLOOD CULTURE FOR BACTERIA: CPT | Performed by: FAMILY MEDICINE

## 2021-02-10 PROCEDURE — 74019 RADEX ABDOMEN 2 VIEWS: CPT

## 2021-02-10 PROCEDURE — 250N000013 HC RX MED GY IP 250 OP 250 PS 637: Performed by: FAMILY MEDICINE

## 2021-02-10 PROCEDURE — 36415 COLL VENOUS BLD VENIPUNCTURE: CPT | Performed by: FAMILY MEDICINE

## 2021-02-10 PROCEDURE — 80048 BASIC METABOLIC PNL TOTAL CA: CPT | Performed by: FAMILY MEDICINE

## 2021-02-10 PROCEDURE — 83605 ASSAY OF LACTIC ACID: CPT | Performed by: FAMILY MEDICINE

## 2021-02-10 PROCEDURE — 250N000011 HC RX IP 250 OP 636: Performed by: FAMILY MEDICINE

## 2021-02-10 PROCEDURE — 99232 SBSQ HOSP IP/OBS MODERATE 35: CPT | Performed by: FAMILY MEDICINE

## 2021-02-10 PROCEDURE — 120N000001 HC R&B MED SURG/OB

## 2021-02-10 PROCEDURE — 85027 COMPLETE CBC AUTOMATED: CPT | Performed by: FAMILY MEDICINE

## 2021-02-10 RX ORDER — DOCUSATE SODIUM 100 MG/1
100 CAPSULE, LIQUID FILLED ORAL 2 TIMES DAILY
Status: DISCONTINUED | OUTPATIENT
Start: 2021-02-10 | End: 2021-02-11

## 2021-02-10 RX ORDER — CEFTRIAXONE SODIUM 2 G/50ML
2 INJECTION, SOLUTION INTRAVENOUS EVERY 24 HOURS
Status: DISCONTINUED | OUTPATIENT
Start: 2021-02-10 | End: 2021-02-11

## 2021-02-10 RX ORDER — SIMVASTATIN 80 MG
80 TABLET ORAL DAILY
COMMUNITY
End: 2021-11-15

## 2021-02-10 RX ORDER — SENNOSIDES 8.6 MG
2 TABLET ORAL 2 TIMES DAILY PRN
Status: DISCONTINUED | OUTPATIENT
Start: 2021-02-10 | End: 2021-02-11 | Stop reason: HOSPADM

## 2021-02-10 RX ADMIN — DOCUSATE SODIUM 100 MG: 100 CAPSULE, LIQUID FILLED ORAL at 14:20

## 2021-02-10 RX ADMIN — ACETAMINOPHEN 650 MG: 325 TABLET, FILM COATED ORAL at 18:01

## 2021-02-10 RX ADMIN — OXYCODONE AND ACETAMINOPHEN 1 TABLET: 5; 325 TABLET ORAL at 13:03

## 2021-02-10 RX ADMIN — DOCUSATE SODIUM 100 MG: 100 CAPSULE, LIQUID FILLED ORAL at 21:25

## 2021-02-10 RX ADMIN — CEFTRIAXONE SODIUM 2 G: 2 INJECTION, SOLUTION INTRAVENOUS at 10:18

## 2021-02-10 RX ADMIN — ACETAMINOPHEN 650 MG: 325 TABLET, FILM COATED ORAL at 01:38

## 2021-02-10 RX ADMIN — OXYCODONE AND ACETAMINOPHEN 1 TABLET: 5; 325 TABLET ORAL at 06:35

## 2021-02-10 RX ADMIN — SODIUM CHLORIDE: 9 INJECTION, SOLUTION INTRAVENOUS at 06:24

## 2021-02-10 ASSESSMENT — ACTIVITIES OF DAILY LIVING (ADL)
ADLS_ACUITY_SCORE: 13

## 2021-02-10 NOTE — ANESTHESIA POSTPROCEDURE EVALUATION
Patient: Maxx Canales    Procedure(s):  Left retrograde pyelogram with stent placement    Diagnosis:Acute pyelonephritis [N10]  Sepsis without acute organ dysfunction, due to unspecified organism (H) [A41.9]  Diagnosis Additional Information: No value filed.    Anesthesia Type:  MAC    Note:  Disposition: Inpatient   Postop Pain Control: Uneventful            Sign Out: Well controlled pain   PONV: No   Neuro/Psych: Uneventful            Sign Out: Acceptable/Baseline neuro status   Airway/Respiratory: Uneventful            Sign Out: Acceptable/Baseline resp. status   CV/Hemodynamics: Uneventful            Sign Out: Acceptable CV status   Other NRE: NONE   DID A NON-ROUTINE EVENT OCCUR? No         Last vitals:  Vitals:    02/09/21 1905 02/09/21 1910 02/09/21 1915   BP: 124/73 127/72    Pulse: 98 96    Resp: 22 20    Temp:   101.4  F (38.6  C)   SpO2: 96% 95% 95%       Last vitals prior to Anesthesia Care Transfer:  CRNA VITALS  2/9/2021 1829 - 2/9/2021 1919 2/9/2021             Resp Rate (set):  10          Electronically Signed By: SEPIDEH VASQUEZ CRNA  February 9, 2021  7:19 PM

## 2021-02-10 NOTE — PROGRESS NOTES
[facility]    Medicine Progress Note - Hospitalist Service       Date of Admission:  2/9/2021  Assessment & Plan     Maxx Canales is a 65 year old male admitted on 2/9/2021. He presented due to development of fever as well as acute worsening left flank pain.      He was found in the emergency department to have an obstructing left ureteral stone with evidence of urinary tract infection and early sepsis as evidenced by leukocytosis, fever and tachycardia.    Patient was treated with ureteral stenting yesterday evening and has had resolution of his flank pain.  He continues to have dysuria and hematuria.    Principal Problem:    Renal lithiasis    Assessment: Patient has history of recurrent stones.  Currently with left 8 x 6 x 10 mm calculus 2 cm above the ureterovesicular junction.    Plan: Stent placed with improvement in symptoms.  Appreciate urology intervention.  Definitive treatment planned.  Active Problems:    Acute pyelonephritis    Assessment: Patient initially with leukocytosis, fever and tachycardia in the setting of obstructing stone.  Now with resolution of tachycardia, improvement in leukocytosis and fever.    Plan: Continue Rocephin and follow urine culture.    Sepsis without acute organ dysfunction, due to unspecified organism (H)    Assessment: Initially.  WBC 18.0.  Temp 103.1.  Pulse 110-117.  No hypotension.  Now doing much better after IV fluid support, IV Rocephin and stent placement.    Plan: Continue IV fluid support, titrate based on oral intake.  Continue Rocephin every 24 hours.    Hydronephrosis of left kidney    Assessment: Due to obstructing stone.  Symptomatically resolved with stent placement    Plan: Appreciate urology intervention.         Diet: Regular Diet Adult    DVT Prophylaxis: Pneumatic Compression Devices  Calvillo Catheter: in place, indication: /GI/GYN Pelvic Procedure  Code Status: Full Code           Disposition Plan   Expected discharge:  Tomorrow, recommended to prior living arrangement once adequate pain management/ tolerating PO medications and antibiotic plan established.  Entered: Scott Bey MD 02/10/2021, 11:54 AM       The patient's care was discussed with the Patient and Patient's Family.    Scott Bey MD  Hospitalist Service  [facility]  Contact information available via [facility] Paging/Directory    ______________________________________________________________________    Interval History   Patient reports significant improvement in his left flank pain since stent placement.  He has had ongoing burning and pain with urination.  Reports his urine continues to be blood-tinged.  Fever has improved substantially since 0100.  Appetite improving.  Currently no nausea.  Denies any chest pain, shortness of breath or other cardiopulmonary symptoms.      Data reviewed today: I reviewed all medications, new labs and imaging results over the last 24 hours. I personally reviewed no images or EKG's today.    Physical Exam   Vital Signs: Temp: 100.1  F (37.8  C) Temp src: Tympanic BP: 127/64 Pulse: 95   Resp: 16 SpO2: 92 % O2 Device: None (Room air) Oxygen Delivery: 2 LPM  Weight: 0 lbs 0 oz  Constitutional: awake, alert, cooperative, no apparent distress, and appears stated age  Respiratory: Clear to auscultation bilaterally  Cardiovascular: Regular rate and rhythm  GI: Abdomen soft, nontender.  Bowel sounds heard in all quadrants.  No rebound or guarding.  No CVA tenderness  Neuropsychiatric: General: normal, calm and normal eye contact  Orientation: oriented to self, place, time and situation  Memory and insight: memory for past and recent events intact and thought process normal    Data   Recent Labs   Lab 02/10/21  0415 02/09/21  1310   WBC 15.0* 18.0*   HGB 9.9* 11.6*   MCV 62* 62*    212    133   POTASSIUM 3.6 3.9   CHLORIDE 104 102   CO2 23 24   BUN 12 12   CR 1.04 1.01   ANIONGAP 10 7   BABAR 8.1* 9.0    * 131*   ALBUMIN  --  3.6   PROTTOTAL  --  7.8   BILITOTAL  --  1.0   ALKPHOS  --  79   ALT  --  25   AST  --  17     Recent Results (from the past 24 hour(s))   CT Abdomen Pelvis w Contrast    Narrative    Exam:CT ABDOMEN PELVIS W CONTRAST    History: 65 years Male Abdominal pain, acute, nonlocalized; Flank  pain, recurrent stone disease suspected - left; sepsis, pyelonephritis  versus abscess versus infected stone, new constiaption     Comparisons:    Technique: Axial CT imaging of the abdomen and pelvis was performed  with intervenous contrast. Coronal and sagittal reconstructions were  obtained      FINDINGS:  Lung bases:The lung bases are clear    Abdomen:  Liver:Unremarkable  Gallbladder and biliary tree:No calcified gallstones are present.  There is no evidence of biliary dilatation.  Pancreas:Unremarkable  Spleen:Unremarkable  Adrenals:Normal    Kidneys and ureters: There is left-sided hydronephrosis and  hydroureter. There is a distal left ureteral calculus measuring 8 x 6  x 10 mm in diameter. The calculus is approximately 2 cm above the  ureterovesical junction. There is left-sided perinephric fat  stranding.    There are 4 calculi at the upper pole of the left kidney. The largest  is 7 mm in diameter. There are 4 very small calculi in the right  kidney, largest is approximately 2 mm.    Lymph nodes:There is no significant lymphadenopathy    Bowel:No abnormally distended or thickened loops of bowel are present.  There is no evidence of bowel obstruction.    Appendix: Small appendicoliths are present. The appendix is otherwise  normal.    Vessels:Unremarkable    Osseous structures: There is degenerative change of the lumbar spine.    Pelvis:There is no evidence of mass or lymphadenopathy. No abnormal  fluid collections are present.            Impression    IMPRESSION: There is a large distal left ureteral calculus measuring 6  x 8 x 10 mm in diameter position 2 cm above the  ureterovesical  junction with left-sided hydroureter and hydronephrosis.    Smaller nonobstructing calculi are present bilaterally.    JANNETH PEARL MD   XR Surgery LAURYN L/T 5 Min Fluoro    Narrative    This exam was marked as non-reportable because it will not be read by a   radiologist or a [place] non-radiologist provider.

## 2021-02-10 NOTE — H&P
[facility]    History and Physical - Hospitalist Service       Date of Admission:  2/9/2021    Assessment & Plan   Maxx Canales is a 65 year old male admitted on 2/9/2021. He presented due to acute worsening of 3-week history of intermittent left flank pain.  Today patient developed fever as well.    He was found in the emergency department to have an obstructing left ureteral stone with evidence of urinary tract infection and early sepsis as evidenced by leukocytosis, fever and tachycardia.    Principal Problem:    Renal lithiasis    Assessment: Patient has history of recurrent stones.  Currently with left 8 x 6 x 10 mm calculus 2 cm above the ureterovesicular junction.    Plan: Patient will be admitted.  Appreciate urology consult.  Will need intervention.  Active Problems:    Acute pyelonephritis    Assessment: Patient with leukocytosis, fever and tachycardia in the setting of obstructing stone.    Plan: In addition to restored ureteral flow patient will be continued on Rocephin and followed closely.  Continue with IV fluids.    Sepsis without acute organ dysfunction, due to unspecified organism (H)    Assessment: WBC 18.0.  Temp 103.1.  Pulse 110-117.  No hypotension.    Plan: IV fluid support, Rocephin every 24 hours and urologic intervention.    Hydronephrosis of left kidney    Assessment: Due to obstructing stone.    Plan: Appreciate urology intervention.       Diet: NPO per Anesthesia Guidelines for Procedure/Surgery Except for: Meds    DVT Prophylaxis: Pneumatic Compression Devices  Calvillo Catheter: not present  Code Status:   FULL CODE         Disposition Plan   Expected discharge: 1-2d, recommended to prior living arrangement once adequate pain management/ tolerating PO medications and antibiotic plan established.  Entered: Scott Bey MD 02/09/2021, 6:05 PM     The patient's care was discussed with the Patient, Patient's Family and Urology Consultant.    Scott Bey  MD  [facility]  Contact information available via [facility] Paging/Directory      ______________________________________________________________________    Chief Complaint   Left flank pain, fever    History is obtained from the patient    History of Present Illness   Maxx Canales is a 65 year old male who presented to outside facility due to acute worsening of left flank pain.  Symptoms began about 3 weeks ago.  He reports initially having urinary frequency and dysuria.  He did not think he had a kidney stone, instead thinking it was a UTI.  Took some Azo which temporarily helped.  Did have 1 day where he felt a little feverish but then this resolved.  Symptoms largely improved up until the past couple of days when he became acutely worse of pain over the left flank and then today with fever and chills.  At this point the pain became more consistent with past episodes of renal lithiasis.    He denies any black or bloody stools.  No recent issues with constipation or diarrhea.    He has not had any recent or remote chest pain, shortness of breath or other anginal equivalents.  No known sick contacts.  No loss of sense of taste or smell.    Last meal was 6 PM yesterday, last liquid intake was coffee at 6 AM.    Review of Systems    CONSTITUTIONAL: NEGATIVE for fever, chills, change in weight  INTEGUMENTARY/SKIN: NEGATIVE for worrisome rashes, moles or lesions  EYES: NEGATIVE for vision changes or irritation  ENT/MOUTH: NEGATIVE for ear, mouth and throat problems  RESP: NEGATIVE for significant cough or SOB  CV: NEGATIVE for chest pain, palpitations or peripheral edema  GI: NEGATIVE for nausea, abdominal pain, heartburn, or change in bowel habits  : See HPI  MUSCULOSKELETAL: NEGATIVE for significant arthralgias or myalgia  NEURO: NEGATIVE for weakness, dizziness or paresthesias  ENDOCRINE: NEGATIVE for temperature intolerance, skin/hair changes  HEME: NEGATIVE for bleeding  problems  PSYCHIATRIC: NEGATIVE for changes in mood or affect    Past Medical History    I have reviewed this patient's medical history and updated it with pertinent information if needed.   Past Medical History:   Diagnosis Date     Class 1 obesity with serious comorbidity and body mass index (BMI) of 33.0 to 33.9 in adult, unspecified obesity type 2017     Diverticulitis of colon 10/10/2013     Dysmetabolic syndrome X 10/3/2007     Erectile dysfunction, unspecified erectile dysfunction type 10/2/2018     Essential hypertension 2003    Overview:  IMO Update     Partially resolved traumatic cataract of right eye 2018     Primary localized osteoarthrosis, lower leg 2013     Pure hypercholesterolemia 2003     Thalassemia 10/3/2007       Past Surgical History   I have reviewed this patient's surgical history and updated it with pertinent information if needed.  Past Surgical History:   Procedure Laterality Date     ABDOMEN SURGERY      sigmoid resection, diverticulitis     COLONOSCOPY  10/21/2013    Procedure: COLONOSCOPY;  COLONOSCOPY with biopsy/ polypectomy;  Surgeon: Carisa Brito DO;  Location: [facility] OR     COLONOSCOPY N/A 10/4/2018    Procedure: COLONOSCOPY;  COLONOSCOPY WITH POLYPECTOMY;  Surgeon: Mj Parsons MD;  Location: [facility] OR     EYE SURGERY  2018    right     IR RENAL STONE REMOVAL RIGHT  2018    8 cm     VASECTOMY         Social History   I have reviewed this patient's social history and updated it with pertinent information if needed.  Social History     Tobacco Use     Smoking status: Former Smoker     Types: Cigarettes     Quit date: 1999     Years since quittin.1     Smokeless tobacco: Former User     Types: Chew     Tobacco comment: occasional cigar   Substance Use Topics     Alcohol use: Yes     Frequency: 4 or more times a week     Drinks per session: 1 or 2     Comment: 3x week     Drug use: No       Family History   I have reviewed this patient's family  history and updated it with pertinent information if needed.  Family History   Problem Relation Age of Onset     Diabetes Mother         healthy in 90s     Heart Disease Father 49        5 MIs,  of MI       Prior to Admission Medications   Prior to Admission Medications   Prescriptions Last Dose Informant Patient Reported? Taking?   ASPIRIN PO  Self Yes No   Sig: Take 81 mg by mouth daily   Multiple Vitamins-Minerals (MULTIVITAMIN OR)  Self Yes No   cholecalciferol (VITAMIN D3) 5000 units (125 mcg) capsule  Self Yes No   Sig: Take by mouth daily   lisinopril (ZESTRIL) 40 MG tablet   No No   Sig: Take 1 tablet (40 mg) by mouth daily   simvastatin (ZOCOR) 80 MG tablet   No No   Sig: Take 1 tablet (80 mg) by mouth At Bedtime      Facility-Administered Medications: None     Allergies   Allergies   Allergen Reactions     Atorvastatin        Physical Exam   Vital Signs: Temp: 100.4  F (38  C) Temp src: Tympanic BP: (!) 160/70 Pulse: 110   Resp: 22 SpO2: 98 % O2 Device: None (Room air)    Weight: 0 lbs 0 oz    Constitutional: awake, alert, cooperative, no apparent distress, and appears stated age  Eyes: Lids and lashes normal, pupils equal, round and reactive to light, extra ocular muscles intact, sclera clear, conjunctiva normal  ENT: Normocephalic, without obvious abnormality, atraumatic, sinuses nontender on palpation, moist mucous membranes.  Hematologic / Lymphatic: No lymphadenopathy  Respiratory: Clear to auscultation bilaterally  Cardiovascular: Regular rate and rhythm.  No murmurs rubs or gallops  GI: Abdomen is soft and currently nontender.  Bowel sounds heard throughout.  No rebound or guarding.  Genitounirinary: During my examination he did not have any flank tenderness.  Skin: No abnormal bruising or bleeding.  Musculoskeletal: No synovitis.  Muscle strength age and body habitus appropriateas well as equal and even.   Neurologic: Cranial nerves intact.  Reflexes bilaterally symmetric.  No deficit in motor  strength.  Cerebellar function intact.  Sensation intact.  Neuropsychiatric: General: normal, calm and normal eye contact  Orientation: oriented to self, place, time and situation  Memory and insight: memory for past and recent events intact and thought process normal    Data   Data reviewed today: I reviewed all medications, new labs and imaging results over the last 24 hours. I personally reviewed I reviewed the abdominal CT done at outside facility showing obstructing stone..    Recent Labs   Lab 02/09/21  1310   WBC 18.0*   HGB 11.6*   MCV 62*         POTASSIUM 3.9   CHLORIDE 102   CO2 24   BUN 12   CR 1.01   ANIONGAP 7   BABAR 9.0   *   ALBUMIN 3.6   PROTTOTAL 7.8   BILITOTAL 1.0   ALKPHOS 79   ALT 25   AST 17     Recent Results (from the past 24 hour(s))   CT Abdomen Pelvis w Contrast    Narrative    Exam:CT ABDOMEN PELVIS W CONTRAST    History: 65 years Male Abdominal pain, acute, nonlocalized; Flank  pain, recurrent stone disease suspected - left; sepsis, pyelonephritis  versus abscess versus infected stone, new constiaption     Comparisons:    Technique: Axial CT imaging of the abdomen and pelvis was performed  with intervenous contrast. Coronal and sagittal reconstructions were  obtained      FINDINGS:  Lung bases:The lung bases are clear    Abdomen:  Liver:Unremarkable  Gallbladder and biliary tree:No calcified gallstones are present.  There is no evidence of biliary dilatation.  Pancreas:Unremarkable  Spleen:Unremarkable  Adrenals:Normal    Kidneys and ureters: There is left-sided hydronephrosis and  hydroureter. There is a distal left ureteral calculus measuring 8 x 6  x 10 mm in diameter. The calculus is approximately 2 cm above the  ureterovesical junction. There is left-sided perinephric fat  stranding.    There are 4 calculi at the upper pole of the left kidney. The largest  is 7 mm in diameter. There are 4 very small calculi in the right  kidney, largest is approximately 2  mm.    Lymph nodes:There is no significant lymphadenopathy    Bowel:No abnormally distended or thickened loops of bowel are present.  There is no evidence of bowel obstruction.    Appendix: Small appendicoliths are present. The appendix is otherwise  normal.    Vessels:Unremarkable    Osseous structures: There is degenerative change of the lumbar spine.    Pelvis:There is no evidence of mass or lymphadenopathy. No abnormal  fluid collections are present.            Impression    IMPRESSION: There is a large distal left ureteral calculus measuring 6  x 8 x 10 mm in diameter position 2 cm above the ureterovesical  junction with left-sided hydroureter and hydronephrosis.    Smaller nonobstructing calculi are present bilaterally.    JANNETH PEARL MD   XR Surgery LAURYN L/T 5 Min Fluoro    Narrative    This exam was marked as non-reportable because it will not be read by a   radiologist or a [place] non-radiologist provider.

## 2021-02-10 NOTE — CONSULTS
I was asked to see this patient by Dr Bey and provide my opinion about the following:  Ureteral stone with pyelonephritis    Type of Visit  Consult    Chief Complaint  Ureteral stone with pyelonephritis    HPI  Mr. Canales is a 65 year old male who presents with symptoms of pyelonephritis.   The patient initially presented to the ED in [place] today with flank pain and fevers.  Patient underwent CT scan revealing an obstructing large left ureteral stone  Labs and clinical symptoms are concerning for pyelonephritis.  Patient is having fevers, chills and nausea.  The patient has undergone surgery in the past for stones.    Pain ROS  Location:  Left flank  Quality:    Sharp  Provocative factors:  Nothing makes it worse  Palliative factors:   Narcotics make it better  Radiation:   None  Severity:   6/10 currently and 9/10 at its worst  Time:     Pain started today      Past Medical History  He  has a past medical history of Class 1 obesity with serious comorbidity and body mass index (BMI) of 33.0 to 33.9 in adult, unspecified obesity type (9/28/2017), Diverticulitis of colon (10/10/2013), Dysmetabolic syndrome X (10/3/2007), Erectile dysfunction, unspecified erectile dysfunction type (10/2/2018), Essential hypertension (4/25/2003), Partially resolved traumatic cataract of right eye (1/18/2018), Primary localized osteoarthrosis, lower leg (2/19/2013), Pure hypercholesterolemia (5/12/2003), and Thalassemia (10/3/2007).  Patient Active Problem List   Diagnosis     Diverticulitis of colon     Class 1 obesity with serious comorbidity and body mass index (BMI) of 33.0 to 33.9 in adult, unspecified obesity type     Dysmetabolic syndrome X     Erectile dysfunction, unspecified erectile dysfunction type     Essential hypertension     Partially resolved traumatic cataract of right eye     Primary localized osteoarthrosis, lower leg     Pure hypercholesterolemia     Thalassemia     Elevated red blood cell count     Acute  pyelonephritis     Sepsis without acute organ dysfunction, due to unspecified organism (H)     Hydronephrosis of left kidney       Past Surgical History  He  has a past surgical history that includes Eye surgery (2018); Abdomen surgery (1997); Colonoscopy (10/21/2013); Colonoscopy (N/A, 10/4/2018); IR Renal Stone Removal Right (2018); and vasectomy.    Medications    Current Facility-Administered Medications:      acetaminophen (TYLENOL) tablet 650 mg, 650 mg, Oral, Q4H PRN, Scott Bey MD     lidocaine (LMX4) cream, , Topical, Q1H PRN, Scott Bey MD     lidocaine 1 % 0.1-1 mL, 0.1-1 mL, Other, Q1H PRN, Scott Bey MD     naloxone (NARCAN) injection 0.2 mg, 0.2 mg, Intravenous, Q2 Min PRN **OR** naloxone (NARCAN) injection 0.4 mg, 0.4 mg, Intravenous, Q2 Min PRN **OR** naloxone (NARCAN) injection 0.2 mg, 0.2 mg, Intramuscular, Q2 Min PRN **OR** naloxone (NARCAN) injection 0.4 mg, 0.4 mg, Intramuscular, Q2 Min PRN, Scott Bey MD     ondansetron (ZOFRAN-ODT) ODT tab 4 mg, 4 mg, Oral, Q6H PRN **OR** ondansetron (ZOFRAN) injection 4 mg, 4 mg, Intravenous, Q6H PRN, Scott Bey MD     oxyCODONE-acetaminophen (PERCOCET) 5-325 MG per tablet 1-2 tablet, 1-2 tablet, Oral, Q4H PRN, Scott Bey MD     Provider ordered ALTERNATE pre op antibiotic., 1 each, As instructed, Continuous, Gerson Reeves MD     sodium chloride (PF) 0.9% PF flush 3 mL, 3 mL, Intracatheter, Q8H PRN, Scott Bey MD     sodium chloride (PF) 0.9% PF flush 3 mL, 3 mL, Intracatheter, q1 min prn, Scott Bey MD     sodium chloride 0.9% infusion, , Intravenous, Continuous, Scott Bey MD, Last Rate: 100 mL/hr at 02/09/21 1739, New Bag at 02/09/21 1739    Allergies  Allergies   Allergen Reactions     Atorvastatin        Social History  He  reports that he quit smoking about 22 years ago. His smoking use included cigarettes. He has quit using smokeless tobacco.  His smokeless tobacco use included  chew. He reports current alcohol use. He reports that he does not use drugs.  No drug abuse.    Family History  Family History   Problem Relation Age of Onset     Diabetes Mother         healthy in 90s     Heart Disease Father 49        5 MIs,  of MI       Review of Systems  I personally reviewed the ROS with the patient.    Unintentional weight loss:  No  Recent fever/chills: Yes Night sweats: No   Current skin rash: No   Recent hair loss: No   Heat intolerance: No   Cold intolerance: No   Chest pain: No   Palpitations: No   Shortness of breath: No  Wheezing: No   Constipation: No   Diarrhea: No   Nausea: Yes    Vomiting: No   Kidney/side pain: Yes   Back pain: No  Frequent headaches: No  Dizziness: No   Leg swelling: No   Calf pain: No      Physical Exam  Vitals:    21 1722   BP: (!) 160/70   Pulse: 110   Resp: 22   Temp: 100.4  F (38  C)   TempSrc: Tympanic   SpO2: 98%     Constitutional: Diaphoretic and uncomfortable.  Alert and cooperative   Head: NCAT  Eyes: Conjunctivae normal  Cardiovascular: Regular rate.  Pulmonary/Chest: Respirations are even and non-labored bilaterally, no audible wheezing  Abdominal: Soft. No distension, tenderness, masses or guarding.   Neurological: A + O x 3.  Cranial Nerves II-XII grossly intact.  Extremities: ABDIAS x 4, Warm. No clubbing.  No cyanosis.    Skin: Pink, warm and dry.  No visible rashes noted.  Psychiatric:  Normal mood and affect  Back:  + left CVA tenderness.  - right CVA tenderness.  Genitourinary: nonpalpable bladder    Labs  Results for orders placed or performed during the hospital encounter of 21   CT Abdomen Pelvis w Contrast     Status: None    Narrative    Exam:CT ABDOMEN PELVIS W CONTRAST    History: 65 years Male Abdominal pain, acute, nonlocalized; Flank  pain, recurrent stone disease suspected - left; sepsis, pyelonephritis  versus abscess versus infected stone, new constiaption     Comparisons:    Technique: Axial CT imaging of the abdomen  and pelvis was performed  with intervenous contrast. Coronal and sagittal reconstructions were  obtained      FINDINGS:  Lung bases:The lung bases are clear    Abdomen:  Liver:Unremarkable  Gallbladder and biliary tree:No calcified gallstones are present.  There is no evidence of biliary dilatation.  Pancreas:Unremarkable  Spleen:Unremarkable  Adrenals:Normal    Kidneys and ureters: There is left-sided hydronephrosis and  hydroureter. There is a distal left ureteral calculus measuring 8 x 6  x 10 mm in diameter. The calculus is approximately 2 cm above the  ureterovesical junction. There is left-sided perinephric fat  stranding.    There are 4 calculi at the upper pole of the left kidney. The largest  is 7 mm in diameter. There are 4 very small calculi in the right  kidney, largest is approximately 2 mm.    Lymph nodes:There is no significant lymphadenopathy    Bowel:No abnormally distended or thickened loops of bowel are present.  There is no evidence of bowel obstruction.    Appendix: Small appendicoliths are present. The appendix is otherwise  normal.    Vessels:Unremarkable    Osseous structures: There is degenerative change of the lumbar spine.    Pelvis:There is no evidence of mass or lymphadenopathy. No abnormal  fluid collections are present.            Impression    IMPRESSION: There is a large distal left ureteral calculus measuring 6  x 8 x 10 mm in diameter position 2 cm above the ureterovesical  junction with left-sided hydroureter and hydronephrosis.    Smaller nonobstructing calculi are present bilaterally.    JANNETH PEARL MD   CBC with platelets differential     Status: Abnormal   Result Value Ref Range    WBC 18.0 (H) 4.0 - 11.0 10e9/L    RBC Count 5.73 4.4 - 5.9 10e12/L    Hemoglobin 11.6 (L) 13.3 - 17.7 g/dL    Hematocrit 35.6 (L) 40.0 - 53.0 %    MCV 62 (L) 78 - 100 fl    MCH 20.2 (L) 26.5 - 33.0 pg    MCHC 32.6 31.5 - 36.5 g/dL    RDW 15.7 (H) 10.0 - 15.0 %    Platelet Count 212 150 - 450  10e9/L    Diff Method Automated Method     % Neutrophils 76.3 %    % Lymphocytes 9.2 %    % Monocytes 13.4 %    % Eosinophils 0.0 %    % Basophils 0.2 %    % Immature Granulocytes 0.9 %    Nucleated RBCs 0 0 /100    Absolute Neutrophil 13.7 (H) 1.6 - 8.3 10e9/L    Absolute Lymphocytes 1.7 0.8 - 5.3 10e9/L    Absolute Monocytes 2.4 (H) 0.0 - 1.3 10e9/L    Absolute Eosinophils 0.0 0.0 - 0.7 10e9/L    Absolute Basophils 0.0 0.0 - 0.2 10e9/L    Abs Immature Granulocytes 0.2 0 - 0.4 10e9/L    Absolute Nucleated RBC 0.0     Anisocytosis Slight     Poikilocytosis Slight     Hypochromasia Present     Toxic Granulation Present     RBC Morphology Consistent with reported results     Platelet Estimate       Automated count confirmed.  Platelet morphology is normal.   Comprehensive metabolic panel     Status: Abnormal   Result Value Ref Range    Sodium 133 133 - 144 mmol/L    Potassium 3.9 3.4 - 5.3 mmol/L    Chloride 102 94 - 109 mmol/L    Carbon Dioxide 24 20 - 32 mmol/L    Anion Gap 7 3 - 14 mmol/L    Glucose 131 (H) 70 - 99 mg/dL    Urea Nitrogen 12 7 - 30 mg/dL    Creatinine 1.01 0.66 - 1.25 mg/dL    GFR Estimate 78 >60 mL/min/[1.73_m2]    GFR Estimate If Black >90 >60 mL/min/[1.73_m2]    Calcium 9.0 8.5 - 10.1 mg/dL    Bilirubin Total 1.0 0.2 - 1.3 mg/dL    Albumin 3.6 3.4 - 5.0 g/dL    Protein Total 7.8 6.8 - 8.8 g/dL    Alkaline Phosphatase 79 40 - 150 U/L    ALT 25 0 - 70 U/L    AST 17 0 - 45 U/L   Lactic acid whole blood     Status: None   Result Value Ref Range    Lactic Acid 1.7 0.7 - 2.0 mmol/L   Symptomatic Influenza A/B & SARS-CoV2 (COVID-19) Virus PCR Multiplex     Status: None    Specimen: Nasopharyngeal   Result Value Ref Range    Flu A/B & SARS-COV-2 PCR Source Nasopharyngeal     SARS-CoV-2 PCR Result NEGATIVE     Influenza A PCR Negative NEG^Negative    Influenza B PCR Negative NEG^Negative    Respiratory Syncytial Virus PCR Negative NEG^Negative    Flu A/B & SARS-CoV-2 PCR Comment       Testing was  performed using the Xpert Xpress SARS-CoV2/Flu/RSV Assay on the [employer]   GeneXpert Instrument. Additional information about the Emergency Use Authorization (EUA)   assay can be found via the Lab Guide.     Procalcitonin     Status: None   Result Value Ref Range    Procalcitonin 0.11 ng/ml   Blood culture     Status: None (Preliminary result)    Specimen: Blood   Result Value Ref Range    Specimen Description Blood     Culture Micro No growth after 1 hour    Results for orders placed or performed in visit on 02/09/21   UA reflex to Microscopic and Culture     Status: Abnormal    Specimen: Midstream Urine   Result Value Ref Range    Color Urine Yellow     Appearance Urine Cloudy     Glucose Urine Negative NEG^Negative mg/dL    Bilirubin Urine Negative NEG^Negative    Ketones Urine Negative NEG^Negative mg/dL    Specific Gravity Urine 1.019 1.003 - 1.035    Blood Urine Small (A) NEG^Negative    pH Urine 6.0 4.7 - 8.0 pH    Protein Albumin Urine 30 (A) NEG^Negative mg/dL    Urobilinogen mg/dL Normal 0.0 - 2.0 mg/dL    Nitrite Urine Negative NEG^Negative    Leukocyte Esterase Urine Large (A) NEG^Negative    Source Midstream Urine     RBC Urine 17 (H) 0 - 2 /HPF    WBC Urine >182 (H) 0 - 5 /HPF    WBC Clumps Present (A) NEG^Negative /HPF    Bacteria Urine Many (A) NEG^Negative /HPF    Squamous Epithelial /HPF Urine 1 0 - 1 /HPF    Mucous Urine Present (A) NEG^Negative /LPF    Amorphous Crystals Few (A) NEG^Negative /HPF       Assessment  Mr. Canales is a 65 year old male who presents with obstructing left  ureteral stone in the presence of clinical infection of the urinary tract.  Explained that this situation needs to be managed urgently due to risks of progression of the infection.  Explained that options include stents and/or nephrostomy tubes.  I recommend urgent placement of a ureteral stent    Plan  Urgent placement of a left  ureteral stent in the OR under MAC sedation.  The stone will be treated definitively on a  delayed timeframe after culture appropriate antibiotics have been administered.

## 2021-02-10 NOTE — PLAN OF CARE
"Patient reports that he has burning pain in his penis of a level 8/10 with voiding. Temp of 100.9-102.7. Tylenol and Percocet administered per MAR. Follow up pain level was a 1/10. Lung sounds are clear throughout lobes. SpO2 90-95% RA. Patient ambulates to the bathroom with a stand-by assist. Bowel sounds are audible and active. 1 large BM throughout the night. Calvillo is patent with a output of 1050 mL. Output is cherry red. Vital signs:  Temp: 99.6  F (37.6  C) Temp src: Tympanic BP: 132/65 Pulse: 91   Resp: 16 SpO2: 92 % O2 Device: None (Room air) Oxygen Delivery: 2 LPM Height: 167.6 cm (5' 6\")    Estimated body mass index is 37.12 kg/m  as calculated from the following:    Height as of this encounter: 1.676 m (5' 6\").    Weight as of an earlier encounter on 2/9/21: 104.3 kg (230 lb).     Sonia Corral RN on 2/10/2021 at 7:00 AM            "

## 2021-02-10 NOTE — ANESTHESIA CARE TRANSFER NOTE
Patient: Maxx Canales    Procedure(s):  Left retrograde pyelogram with stent placement    Diagnosis: Acute pyelonephritis [N10]  Sepsis without acute organ dysfunction, due to unspecified organism (H) [A41.9]  Diagnosis Additional Information: No value filed.    Anesthesia Type:   MAC     Note:    Oropharynx: oropharynx clear of all foreign objects  Level of Consciousness: awake  Oxygen Supplementation: room air    Independent Airway: airway patency satisfactory and stable  Dentition: dentition unchanged  Vital Signs Stable: post-procedure vital signs reviewed and stable  Report to RN Given: handoff report given  Patient transferred to: PACU    Handoff Report: Identifed the Patient, Identified the Reponsible Provider, Reviewed the pertinent medical history, Discussed the surgical course, Reviewed Intra-OP anesthesia mangement and issues during anesthesia, Set expectations for post-procedure period and Allowed opportunity for questions and acknowledgement of understanding      Vitals: (Last set prior to Anesthesia Care Transfer)  CRNA VITALS  2/9/2021 1829 - 2/9/2021 1903      2/9/2021             Resp Rate (set):  10        Electronically Signed By: SEPIDEH VASQUEZ CRNA  February 9, 2021  7:03 PM

## 2021-02-10 NOTE — OP NOTE
Preoperative diagnosis  Pyelonephritis  Left ureteral stone    Postoperative diagnosis  Pyelonephritis  Left ureteral stone    Procedure performed  Cystoscopy with left retrograde pyelogram and ureteral stent placement, 6 x 26  Interpretation of retrograde    Surgeon  Gerson Reeves MD    Surgeon(s)/Proceduralist(s) and Assistants (if any)  Surgeon(s):  Gerson Reeves MD  Circulator: Maria De Jesus Barajas RN  Scrub Person: Mars Wyatt  X-Ray Technologist: Ivette Srinivasan    Specimen(s)  No    (EBL) Estimated blood loss (ml)  0    Anesthesia  General    Complications  None    Findings  Cystoscopy revealed no tumors, stones or other mucosal abnormalities.  Left retrograde pyelogram revealed a mildly dilated system with no filling defects.    Indications  65 year old male agreed to undergo the above named procedure after discussion of the alternatives, risks and benefits.  Informed consent was obtained.      Procedure  The patient was taken to the operating room and placed supine on the operating table.  Pre-operative antibiotics were administered.  Bilateral lower extremity SCDs were placed.  After induction of general anesthesia the patient was positioned in dorsal lithotomy, prepped and draped in a sterile fashion.  A time-out was performed.      I passed a [id number] Wallisian flexible cystoscope via urethra into the bladder.  A Sensor wire was passed retrograde through the left ureteral orifice.  A 5-Wallisian open-ended catheter was passed over the wire.  A retrograde pyelogram was performed by slowly injecting 3mL of Omnipaque contrast via the 5 Wallisian catheter with findings described above. A superstiff wire was then placed into the upper pole and a 6 x 26 Wallisian ureteral stent was placed in retrograde fashion under fluoroscopic guidance with good coil confirmed to be in the upper pole of the kidney and in the bladder. The patient tolerated the procedure well.    Plan  Urology clinic will coordinate definitive  treatment of left ureteral stone

## 2021-02-10 NOTE — OR NURSING
PACU Transfer Note    Maxx Canales was transferred to [facility] via bed.  Equipment used for transport:  2L NC.  Accompanied by:  Irma NARVAEZ RN  Prescriptions were: none    PACU Respiratory Event Documentation     1) Episodes of Apnea greater than or equal to 10 seconds: 0    2) Bradypnea - less than 8 breaths per minute: 0    3) Pain score on 0 to 10 scale: 0    4) Pain-sedation mismatch (yes or no): no    5) Repeated 02 desaturation less than 90% (yes or no): no    Anesthesia notified? (yes or no): na    Any of the above events occuring repeatedly in separate 30 minute intervals may be considered recurrent PACU respiratory events.    Patient stable and meets phase 1 discharge criteria for transport from PACU.

## 2021-02-10 NOTE — PLAN OF CARE
"Patient is alert and orientated. VSS, temp 100.1. Patient reports pain with voiding. Declines any interventions. Patient had PRN percocet given this morning. Pt reports moderate relief. Calvillo is patent. Urine is cherry red in color. Ambulating SBA, tolerating reg diet. LS clear, BS active, HR reg. Will continue to monitor.     Marisel Mirza on 2/10/2021 at 11:17 AM    /64   Pulse 95   Temp 100.1  F (37.8  C) (Tympanic)   Resp 16   Ht 1.676 m (5' 6\")   SpO2 92%   BMI 37.12 kg/m       "

## 2021-02-10 NOTE — H&P (VIEW-ONLY)
[facility]    History and Physical - Hospitalist Service       Date of Admission:  2/9/2021    Assessment & Plan   Maxx Canales is a 65 year old male admitted on 2/9/2021. He presented due to acute worsening of 3-week history of intermittent left flank pain.  Today patient developed fever as well.    He was found in the emergency department to have an obstructing left ureteral stone with evidence of urinary tract infection and early sepsis as evidenced by leukocytosis, fever and tachycardia.    Principal Problem:    Renal lithiasis    Assessment: Patient has history of recurrent stones.  Currently with left 8 x 6 x 10 mm calculus 2 cm above the ureterovesicular junction.    Plan: Patient will be admitted.  Appreciate urology consult.  Will need intervention.  Active Problems:    Acute pyelonephritis    Assessment: Patient with leukocytosis, fever and tachycardia in the setting of obstructing stone.    Plan: In addition to restored ureteral flow patient will be continued on Rocephin and followed closely.  Continue with IV fluids.    Sepsis without acute organ dysfunction, due to unspecified organism (H)    Assessment: WBC 18.0.  Temp 103.1.  Pulse 110-117.  No hypotension.    Plan: IV fluid support, Rocephin every 24 hours and urologic intervention.    Hydronephrosis of left kidney    Assessment: Due to obstructing stone.    Plan: Appreciate urology intervention.       Diet: NPO per Anesthesia Guidelines for Procedure/Surgery Except for: Meds    DVT Prophylaxis: Pneumatic Compression Devices  Calvillo Catheter: not present  Code Status:   FULL CODE         Disposition Plan   Expected discharge: 1-2d, recommended to prior living arrangement once adequate pain management/ tolerating PO medications and antibiotic plan established.  Entered: Scott Bey MD 02/09/2021, 6:05 PM     The patient's care was discussed with the Patient, Patient's Family and Urology Consultant.    Scott Bey  MD  [facility]  Contact information available via [facility] Paging/Directory      ______________________________________________________________________    Chief Complaint   Left flank pain, fever    History is obtained from the patient    History of Present Illness   Maxx Canales is a 65 year old male who presented to outside facility due to acute worsening of left flank pain.  Symptoms began about 3 weeks ago.  He reports initially having urinary frequency and dysuria.  He did not think he had a kidney stone, instead thinking it was a UTI.  Took some Azo which temporarily helped.  Did have 1 day where he felt a little feverish but then this resolved.  Symptoms largely improved up until the past couple of days when he became acutely worse of pain over the left flank and then today with fever and chills.  At this point the pain became more consistent with past episodes of renal lithiasis.    He denies any black or bloody stools.  No recent issues with constipation or diarrhea.    He has not had any recent or remote chest pain, shortness of breath or other anginal equivalents.  No known sick contacts.  No loss of sense of taste or smell.    Last meal was 6 PM yesterday, last liquid intake was coffee at 6 AM.    Review of Systems    CONSTITUTIONAL: NEGATIVE for fever, chills, change in weight  INTEGUMENTARY/SKIN: NEGATIVE for worrisome rashes, moles or lesions  EYES: NEGATIVE for vision changes or irritation  ENT/MOUTH: NEGATIVE for ear, mouth and throat problems  RESP: NEGATIVE for significant cough or SOB  CV: NEGATIVE for chest pain, palpitations or peripheral edema  GI: NEGATIVE for nausea, abdominal pain, heartburn, or change in bowel habits  : See HPI  MUSCULOSKELETAL: NEGATIVE for significant arthralgias or myalgia  NEURO: NEGATIVE for weakness, dizziness or paresthesias  ENDOCRINE: NEGATIVE for temperature intolerance, skin/hair changes  HEME: NEGATIVE for bleeding  problems  PSYCHIATRIC: NEGATIVE for changes in mood or affect    Past Medical History    I have reviewed this patient's medical history and updated it with pertinent information if needed.   Past Medical History:   Diagnosis Date     Class 1 obesity with serious comorbidity and body mass index (BMI) of 33.0 to 33.9 in adult, unspecified obesity type 2017     Diverticulitis of colon 10/10/2013     Dysmetabolic syndrome X 10/3/2007     Erectile dysfunction, unspecified erectile dysfunction type 10/2/2018     Essential hypertension 2003    Overview:  IMO Update     Partially resolved traumatic cataract of right eye 2018     Primary localized osteoarthrosis, lower leg 2013     Pure hypercholesterolemia 2003     Thalassemia 10/3/2007       Past Surgical History   I have reviewed this patient's surgical history and updated it with pertinent information if needed.  Past Surgical History:   Procedure Laterality Date     ABDOMEN SURGERY      sigmoid resection, diverticulitis     COLONOSCOPY  10/21/2013    Procedure: COLONOSCOPY;  COLONOSCOPY with biopsy/ polypectomy;  Surgeon: Carisa Brito DO;  Location: [facility] OR     COLONOSCOPY N/A 10/4/2018    Procedure: COLONOSCOPY;  COLONOSCOPY WITH POLYPECTOMY;  Surgeon: Mj Parsons MD;  Location: [facility] OR     EYE SURGERY  2018    right     IR RENAL STONE REMOVAL RIGHT  2018    8 cm     VASECTOMY         Social History   I have reviewed this patient's social history and updated it with pertinent information if needed.  Social History     Tobacco Use     Smoking status: Former Smoker     Types: Cigarettes     Quit date: 1999     Years since quittin.1     Smokeless tobacco: Former User     Types: Chew     Tobacco comment: occasional cigar   Substance Use Topics     Alcohol use: Yes     Frequency: 4 or more times a week     Drinks per session: 1 or 2     Comment: 3x week     Drug use: No       Family History   I have reviewed this patient's family  history and updated it with pertinent information if needed.  Family History   Problem Relation Age of Onset     Diabetes Mother         healthy in 90s     Heart Disease Father 49        5 MIs,  of MI       Prior to Admission Medications   Prior to Admission Medications   Prescriptions Last Dose Informant Patient Reported? Taking?   ASPIRIN PO  Self Yes No   Sig: Take 81 mg by mouth daily   Multiple Vitamins-Minerals (MULTIVITAMIN OR)  Self Yes No   cholecalciferol (VITAMIN D3) 5000 units (125 mcg) capsule  Self Yes No   Sig: Take by mouth daily   lisinopril (ZESTRIL) 40 MG tablet   No No   Sig: Take 1 tablet (40 mg) by mouth daily   simvastatin (ZOCOR) 80 MG tablet   No No   Sig: Take 1 tablet (80 mg) by mouth At Bedtime      Facility-Administered Medications: None     Allergies   Allergies   Allergen Reactions     Atorvastatin        Physical Exam   Vital Signs: Temp: 100.4  F (38  C) Temp src: Tympanic BP: (!) 160/70 Pulse: 110   Resp: 22 SpO2: 98 % O2 Device: None (Room air)    Weight: 0 lbs 0 oz    Constitutional: awake, alert, cooperative, no apparent distress, and appears stated age  Eyes: Lids and lashes normal, pupils equal, round and reactive to light, extra ocular muscles intact, sclera clear, conjunctiva normal  ENT: Normocephalic, without obvious abnormality, atraumatic, sinuses nontender on palpation, moist mucous membranes.  Hematologic / Lymphatic: No lymphadenopathy  Respiratory: Clear to auscultation bilaterally  Cardiovascular: Regular rate and rhythm.  No murmurs rubs or gallops  GI: Abdomen is soft and currently nontender.  Bowel sounds heard throughout.  No rebound or guarding.  Genitounirinary: During my examination he did not have any flank tenderness.  Skin: No abnormal bruising or bleeding.  Musculoskeletal: No synovitis.  Muscle strength age and body habitus appropriateas well as equal and even.   Neurologic: Cranial nerves intact.  Reflexes bilaterally symmetric.  No deficit in motor  strength.  Cerebellar function intact.  Sensation intact.  Neuropsychiatric: General: normal, calm and normal eye contact  Orientation: oriented to self, place, time and situation  Memory and insight: memory for past and recent events intact and thought process normal    Data   Data reviewed today: I reviewed all medications, new labs and imaging results over the last 24 hours. I personally reviewed I reviewed the abdominal CT done at outside facility showing obstructing stone..    Recent Labs   Lab 02/09/21  1310   WBC 18.0*   HGB 11.6*   MCV 62*         POTASSIUM 3.9   CHLORIDE 102   CO2 24   BUN 12   CR 1.01   ANIONGAP 7   BABAR 9.0   *   ALBUMIN 3.6   PROTTOTAL 7.8   BILITOTAL 1.0   ALKPHOS 79   ALT 25   AST 17     Recent Results (from the past 24 hour(s))   CT Abdomen Pelvis w Contrast    Narrative    Exam:CT ABDOMEN PELVIS W CONTRAST    History: 65 years Male Abdominal pain, acute, nonlocalized; Flank  pain, recurrent stone disease suspected - left; sepsis, pyelonephritis  versus abscess versus infected stone, new constiaption     Comparisons:    Technique: Axial CT imaging of the abdomen and pelvis was performed  with intervenous contrast. Coronal and sagittal reconstructions were  obtained      FINDINGS:  Lung bases:The lung bases are clear    Abdomen:  Liver:Unremarkable  Gallbladder and biliary tree:No calcified gallstones are present.  There is no evidence of biliary dilatation.  Pancreas:Unremarkable  Spleen:Unremarkable  Adrenals:Normal    Kidneys and ureters: There is left-sided hydronephrosis and  hydroureter. There is a distal left ureteral calculus measuring 8 x 6  x 10 mm in diameter. The calculus is approximately 2 cm above the  ureterovesical junction. There is left-sided perinephric fat  stranding.    There are 4 calculi at the upper pole of the left kidney. The largest  is 7 mm in diameter. There are 4 very small calculi in the right  kidney, largest is approximately 2  mm.    Lymph nodes:There is no significant lymphadenopathy    Bowel:No abnormally distended or thickened loops of bowel are present.  There is no evidence of bowel obstruction.    Appendix: Small appendicoliths are present. The appendix is otherwise  normal.    Vessels:Unremarkable    Osseous structures: There is degenerative change of the lumbar spine.    Pelvis:There is no evidence of mass or lymphadenopathy. No abnormal  fluid collections are present.            Impression    IMPRESSION: There is a large distal left ureteral calculus measuring 6  x 8 x 10 mm in diameter position 2 cm above the ureterovesical  junction with left-sided hydroureter and hydronephrosis.    Smaller nonobstructing calculi are present bilaterally.    JANNETH PEARL MD   XR Surgery LAURYN L/T 5 Min Fluoro    Narrative    This exam was marked as non-reportable because it will not be read by a   radiologist or a [place] non-radiologist provider.

## 2021-02-10 NOTE — PHARMACY-ADMISSION MEDICATION HISTORY
Pharmacy -- Admission Medication Reconciliation    Prior to admission (PTA) medications were reviewed and the patient's PTA medication list was updated.    Sources Consulted: patient phone interview, sure scripts, chart review    The reliability of this Medication Reconciliation is: Reliability: Reliable    The following significant changes were made:    Added directions to vitamin d    Added directions to MVI    Updated simvastatin to morning      In addition, the patient's allergies were reviewed with the patient and updated as follows:   Allergies: Atorvastatin    The pharmacist has reviewed with the patient that all personal medications should be removed from the building or locked in the belongings safe.  Patient shall only take medications ordered by the physician and administered by the nursing staff.       Medication barriers identified: none   Medication adherence concerns: none   Understanding of emergency medications: NA    Lien Sandy RPH, 2/10/2021,  10:28 AM

## 2021-02-10 NOTE — PROGRESS NOTES
"Patient transferred to [facility] at 1930. Report received from [place] prior to transfer. Patient reports burning pain in his penis of a level 8/10. Temp of 100.9. Tylenol administered per MAR. Follow up pain level was a 1/10. Will continue to monitor. Vital signs:  Temp: 100.5  F (38.1  C) Temp src: Tympanic BP: 131/68 Pulse: 100   Resp: 16 SpO2: 92 % O2 Device: None (Room air)     Estimated body mass index is 36.57 kg/m  as calculated from the following:    Height as of an earlier encounter on 2/9/21: 1.689 m (5' 6.5\").    Weight as of an earlier encounter on 2/9/21: 104.3 kg (230 lb).        Sonia Corral RN on 2/9/2021 at 9:25 PM    "

## 2021-02-10 NOTE — PLAN OF CARE
BP (!) 160/70   Pulse 110   Temp 100.4  F (38  C) (Tympanic)   Resp 22   SpO2 98%     Pt is AxO x4, VSS, afebrile. Pt denies any pain. Pt reports pain with urination. Pt reports he has dribbling, incontinence and frequency. LS clear, BS active, HR reg. Will continue to monitor.     Marisel Blue on 2/9/2021 at 6:33 PM

## 2021-02-11 VITALS
RESPIRATION RATE: 16 BRPM | BODY MASS INDEX: 36.93 KG/M2 | DIASTOLIC BLOOD PRESSURE: 63 MMHG | HEIGHT: 66 IN | TEMPERATURE: 98.8 F | HEART RATE: 86 BPM | WEIGHT: 229.8 LBS | SYSTOLIC BLOOD PRESSURE: 115 MMHG | OXYGEN SATURATION: 96 %

## 2021-02-11 LAB
ANION GAP SERPL CALCULATED.3IONS-SCNC: 9 MMOL/L (ref 3–14)
BACTERIA SPEC CULT: ABNORMAL
BUN SERPL-MCNC: 9 MG/DL (ref 7–25)
CALCIUM SERPL-MCNC: 7.9 MG/DL (ref 8.6–10.3)
CHLORIDE SERPL-SCNC: 104 MMOL/L (ref 98–107)
CO2 SERPL-SCNC: 24 MMOL/L (ref 21–31)
CREAT SERPL-MCNC: 0.9 MG/DL (ref 0.7–1.3)
ERYTHROCYTE [DISTWIDTH] IN BLOOD BY AUTOMATED COUNT: 15.2 % (ref 10–15)
GFR SERPL CREATININE-BSD FRML MDRD: 85 ML/MIN/{1.73_M2}
GLUCOSE SERPL-MCNC: 132 MG/DL (ref 70–105)
HCT VFR BLD AUTO: 28.2 % (ref 40–53)
HGB BLD-MCNC: 9 G/DL (ref 13.3–17.7)
MCH RBC QN AUTO: 19.8 PG (ref 26.5–33)
MCHC RBC AUTO-ENTMCNC: 31.9 G/DL (ref 31.5–36.5)
MCV RBC AUTO: 62 FL (ref 78–100)
PLATELET # BLD AUTO: 155 10E9/L (ref 150–450)
POTASSIUM SERPL-SCNC: 3.8 MMOL/L (ref 3.5–5.1)
RBC # BLD AUTO: 4.55 10E12/L (ref 4.4–5.9)
SODIUM SERPL-SCNC: 137 MMOL/L (ref 134–144)
SPECIMEN SOURCE: ABNORMAL
WBC # BLD AUTO: 9.7 10E9/L (ref 4–11)

## 2021-02-11 PROCEDURE — 99239 HOSP IP/OBS DSCHRG MGMT >30: CPT | Performed by: FAMILY MEDICINE

## 2021-02-11 PROCEDURE — 80048 BASIC METABOLIC PNL TOTAL CA: CPT | Performed by: FAMILY MEDICINE

## 2021-02-11 PROCEDURE — 85027 COMPLETE CBC AUTOMATED: CPT | Performed by: FAMILY MEDICINE

## 2021-02-11 PROCEDURE — 250N000011 HC RX IP 250 OP 636: Performed by: FAMILY MEDICINE

## 2021-02-11 PROCEDURE — 250N000013 HC RX MED GY IP 250 OP 250 PS 637: Performed by: FAMILY MEDICINE

## 2021-02-11 PROCEDURE — 36415 COLL VENOUS BLD VENIPUNCTURE: CPT | Performed by: FAMILY MEDICINE

## 2021-02-11 PROCEDURE — 258N000003 HC RX IP 258 OP 636: Performed by: FAMILY MEDICINE

## 2021-02-11 RX ORDER — AMOXICILLIN 250 MG
1 CAPSULE ORAL 2 TIMES DAILY
Status: DISCONTINUED | OUTPATIENT
Start: 2021-02-11 | End: 2021-02-11 | Stop reason: HOSPADM

## 2021-02-11 RX ORDER — CEFUROXIME AXETIL 250 MG/1
250 TABLET ORAL EVERY 12 HOURS SCHEDULED
Status: DISCONTINUED | OUTPATIENT
Start: 2021-02-12 | End: 2021-02-11 | Stop reason: HOSPADM

## 2021-02-11 RX ORDER — CEFPODOXIME PROXETIL 100 MG/1
200 TABLET, FILM COATED ORAL 2 TIMES DAILY
Status: DISCONTINUED | OUTPATIENT
Start: 2021-02-12 | End: 2021-02-11

## 2021-02-11 RX ORDER — CEFPODOXIME PROXETIL 200 MG/1
200 TABLET, FILM COATED ORAL 2 TIMES DAILY
Qty: 20 TABLET | Refills: 0 | Status: SHIPPED | OUTPATIENT
Start: 2021-02-12 | End: 2021-02-11

## 2021-02-11 RX ORDER — CEFTRIAXONE SODIUM 2 G/50ML
2 INJECTION, SOLUTION INTRAVENOUS ONCE
Status: COMPLETED | OUTPATIENT
Start: 2021-02-11 | End: 2021-02-11

## 2021-02-11 RX ORDER — CEFUROXIME AXETIL 250 MG/1
250 TABLET ORAL 2 TIMES DAILY
Qty: 8 TABLET | Refills: 0 | Status: SHIPPED | OUTPATIENT
Start: 2021-02-12 | End: 2021-02-11

## 2021-02-11 RX ORDER — AMOXICILLIN 250 MG
1 CAPSULE ORAL 2 TIMES DAILY
Qty: 30 TABLET | Refills: 0 | Status: SHIPPED | OUTPATIENT
Start: 2021-02-11 | End: 2021-04-05

## 2021-02-11 RX ORDER — CEFUROXIME AXETIL 250 MG/1
250 TABLET ORAL EVERY 12 HOURS
Qty: 20 TABLET | Refills: 0 | Status: SHIPPED | OUTPATIENT
Start: 2021-02-12 | End: 2021-02-22

## 2021-02-11 RX ADMIN — SODIUM CHLORIDE: 9 INJECTION, SOLUTION INTRAVENOUS at 04:29

## 2021-02-11 RX ADMIN — CEFTRIAXONE SODIUM 2 G: 2 INJECTION, SOLUTION INTRAVENOUS at 09:39

## 2021-02-11 RX ADMIN — ACETAMINOPHEN 650 MG: 325 TABLET, FILM COATED ORAL at 01:39

## 2021-02-11 ASSESSMENT — ACTIVITIES OF DAILY LIVING (ADL)
ADLS_ACUITY_SCORE: 13

## 2021-02-11 ASSESSMENT — MIFFLIN-ST. JEOR: SCORE: 1775.12

## 2021-02-11 NOTE — DISCHARGE SUMMARY
[facility]  Hospitalist Discharge Summary      Date of Admission:  2/9/2021  Date of Discharge:  2/11/2021  Discharging Provider: Scott Bey MD      Discharge Diagnoses   Left ureteral lithiasis  Left hydronephrosis  Pyelonephritis  Sepsis due to pansensitive E. coli    Follow-ups Needed After Discharge   Follow-up Appointments     Follow-up and recommended labs and tests       Follow-up with Dr. Reeves as scheduled next week             Unresulted Labs Ordered in the Past 30 Days of this Admission     Date and Time Order Name Status Description    2/10/2021 1756 Blood culture Preliminary     2/10/2021 1756 Blood culture Preliminary     2/9/2021 1312 Blood culture Preliminary     2/9/2021 1257 Blood culture Preliminary       These results will be followed up by ordering or follow-up physician    Discharge Disposition   Discharged to home  Condition at discharge: Good      Hospital Course       Maxx Canales is a 65 year old male admitted on 2/9/2021. He presented due to development of fever as well as acute worsening left flank pain.      He was found in the emergency department to have an obstructing left ureteral stone with evidence of urinary tract infection and early sepsis as evidenced by leukocytosis, fever and tachycardia.    Patient was started on Rocephin and evaluated by urology who immediately stented his left ureter.  His flank pain resolved after the procedure.  His leukocytosis resolved with ongoing IV antibiotics.  His fever curve improved but did not completely resolved.  His urine culture was positive for pansensitive E. Coli.    Patient felt well on the morning of discharge with no ongoing abdominal or flank pain.  We felt it would be reasonable to return home with an additional 10d of oral cephalosporin.    He did have some left knee pain.  His left knee had no redness or warmth but did have a mild to moderate effusion.  He reports past history of osteoarthritis  well controlled with corticosteroid injections.  I would suggest icing and range of motion exercises with outpatient follow-up for consideration of steroid shot once he has completed his antibiotic course.  Discussed with patient signs and symptoms of septic arthritis and need for emergent evaluation should they occur.    Patient has not had a bowel movement now for 3 days.  He reports having Dulcolax suppositories at home.  I recommend he start senna with Colace 1 pill twice per day, increasing to 2 pills twice per day if needed to have daily bowel movement.  Also recommend resuming normal activity level.  He does not want any narcotics going home as he reports no pain.          Consultations This Hospital Stay   None    Code Status   Full Code    Time Spent on this Encounter   I, Scott Bey MD, personally saw the patient today and spent greater than 30 minutes discharging this patient.       Scott Bey MD  [facility]  [address]  [facility] [address]  Phone: [phone]  Fax: [phone]  ______________________________________________________________________    Physical Exam   Vital Signs: Temp: 98.8  F (37.1  C) Temp src: Tympanic BP: 115/63 Pulse: 86   Resp: 16 SpO2: 96 % O2 Device: None (Room air)    Weight: 229 lbs 12.8 oz  Constitutional: awake, alert, cooperative, no apparent distress, and appears stated age  Respiratory: Clear to auscultation bilaterally  Cardiovascular: Regular rate and rhythm  GI: Abdomen soft, nontender.  No CVA tenderness.  Bowel sounds are in all quadrants.  Musculoskeletal: Left knee with mild to moderate effusion.  Mild medial joint line tenderness and crepitus.  No redness or warmth.  Range of motion remains full.  Neuropsychiatric: General: normal, calm and normal eye contact  Orientation: oriented to self, place, time and situation  Memory and insight: memory for past and recent events intact and thought process normal        Primary Care Physician   Alison Duffy    Discharge Orders      Reason for your hospital stay    You had an obstructing left kidney stone as well as a urinary tract infection.  We started you on ceftriaxone and Dr. Reeves placed a stent to restore flow from your kidney to your bladder.  Your white count normalized.  Your fever improved and your urine grew out pansensitive E. coli.  We had a good discussion and Dr. Reeves and I as well as yourself all think it is reasonable to return home on oral Ceftin with urgent follow-up should you have any recurrent symptoms or persistent fever.  Otherwise he has scheduled your routine follow-up for stone extraction and stent removal.     Follow-up and recommended labs and tests     Follow-up with Dr. Reeves as scheduled next week     Activity    Your activity upon discharge: activity as tolerated     When to contact your care team    Persistent/increased fever.     Full Code     Diet    Follow this diet upon discharge: Orders Placed This Encounter      Regular Diet Adult       Significant Results and Procedures   Most Recent 3 CBC's:  Recent Labs   Lab Test 02/11/21  0413 02/10/21  0415 02/09/21  1310   WBC 9.7 15.0* 18.0*   HGB 9.0* 9.9* 11.6*   MCV 62* 62* 62*    154 212     Most Recent 3 BMP's:  Recent Labs   Lab Test 02/11/21  0413 02/10/21  0415 02/09/21  1310    137 133   POTASSIUM 3.8 3.6 3.9   CHLORIDE 104 104 102   CO2 24 23 24   BUN 9 12 12   CR 0.90 1.04 1.01   ANIONGAP 9 10 7   BABAR 7.9* 8.1* 9.0   * 151* 131*     Most Recent 6 Bacteria Isolates From Any Culture (See EPIC Reports for Culture Details):  Recent Labs   Lab Test 02/10/21  1814 02/09/21  1331 02/09/21  1310 02/09/21  1055   CULT No growth after 13 hours  No growth after 13 hours No growth after 2 days No growth after 2 days >100,000 colonies/mL  Escherichia coli  *     Most Recent Urinalysis:  Recent Labs   Lab Test 02/09/21  1055   COLOR Yellow   APPEARANCE Cloudy   URINEGLC Negative    URINEBILI Negative   URINEKETONE Negative   SG 1.019   UBLD Small*   URINEPH 6.0   PROTEIN 30*   NITRITE Negative   LEUKEST Large*   RBCU 17*   WBCU >182*   ,   Results for orders placed or performed during the hospital encounter of 02/09/21   CT Abdomen Pelvis w Contrast    Narrative    Exam:CT ABDOMEN PELVIS W CONTRAST    History: 65 years Male Abdominal pain, acute, nonlocalized; Flank  pain, recurrent stone disease suspected - left; sepsis, pyelonephritis  versus abscess versus infected stone, new constiaption     Comparisons:    Technique: Axial CT imaging of the abdomen and pelvis was performed  with intervenous contrast. Coronal and sagittal reconstructions were  obtained      FINDINGS:  Lung bases:The lung bases are clear    Abdomen:  Liver:Unremarkable  Gallbladder and biliary tree:No calcified gallstones are present.  There is no evidence of biliary dilatation.  Pancreas:Unremarkable  Spleen:Unremarkable  Adrenals:Normal    Kidneys and ureters: There is left-sided hydronephrosis and  hydroureter. There is a distal left ureteral calculus measuring 8 x 6  x 10 mm in diameter. The calculus is approximately 2 cm above the  ureterovesical junction. There is left-sided perinephric fat  stranding.    There are 4 calculi at the upper pole of the left kidney. The largest  is 7 mm in diameter. There are 4 very small calculi in the right  kidney, largest is approximately 2 mm.    Lymph nodes:There is no significant lymphadenopathy    Bowel:No abnormally distended or thickened loops of bowel are present.  There is no evidence of bowel obstruction.    Appendix: Small appendicoliths are present. The appendix is otherwise  normal.    Vessels:Unremarkable    Osseous structures: There is degenerative change of the lumbar spine.    Pelvis:There is no evidence of mass or lymphadenopathy. No abnormal  fluid collections are present.            Impression    IMPRESSION: There is a large distal left ureteral calculus measuring  6  x 8 x 10 mm in diameter position 2 cm above the ureterovesical  junction with left-sided hydroureter and hydronephrosis.    Smaller nonobstructing calculi are present bilaterally.    JANNETH PEARL MD   XR Surgery LAURYN L/T 5 Min Fluoro    Narrative    This exam was marked as non-reportable because it will not be read by a   radiologist or a [place] non-radiologist provider.               Discharge Medications   Current Discharge Medication List      START taking these medications    Details   cefuroxime (CEFTIN) 250 MG tablet Take 1 tablet (250 mg) by mouth every 12 hours for 10 days  Qty: 20 tablet, Refills: 0    Associated Diagnoses: Acute pyelonephritis      senna-docusate (SENOKOT-S/PERICOLACE) 8.6-50 MG tablet Take 1 tablet by mouth 2 times daily  Qty: 30 tablet, Refills: 0    Associated Diagnoses: Drug-induced constipation         CONTINUE these medications which have NOT CHANGED    Details   lisinopril (ZESTRIL) 40 MG tablet Take 1 tablet (40 mg) by mouth daily  Qty: 90 tablet, Refills: 3    Associated Diagnoses: Benign essential hypertension      Multiple Vitamins-Minerals (MULTIVITAMIN OR) Take 1 tablet by mouth daily       simvastatin (ZOCOR) 80 MG tablet Take 80 mg by mouth daily      ASPIRIN PO Take 81 mg by mouth daily      cholecalciferol (VITAMIN D3) 5000 units (125 mcg) capsule Take 125 mcg by mouth daily            Allergies   Allergies   Allergen Reactions     Atorvastatin      Muscle pain      no

## 2021-02-11 NOTE — PLAN OF CARE
"Pt is alert and oriented. VSS. Denies pain. Temp 101.3. tylenol given with no relief. Pt voiding without difficulty. Passing gas, no stool. Pt slept most of night. Will cont. To monitor.   /63   Pulse 94   Temp 101.1  F (38.4  C) (Tympanic)   Resp 18   Ht 1.676 m (5' 6\")   Wt 104.2 kg (229 lb 12.8 oz)   SpO2 94%   BMI 37.09 kg/m        Gilda Anderson RN on 2/11/2021 at 5:21 AM      "

## 2021-02-11 NOTE — PLAN OF CARE
Pt is A&O4. POD 2 following stent placement for L ureter. This is his second surgery and 5th stone. Voiding without issues. C/o joint swelling to L knee and hands. IV capped for shower. No BM but declines colace due to not wanting to worry about BM in route to home with possible discharge. LS dim, encouraged TCDB.

## 2021-02-11 NOTE — PROGRESS NOTES
[facility] DISCHARGE NOTE    Patient discharged to home at 11:28 AM via wheel chair. Accompanied by spouse and staff. Discharge instructions reviewed with patient, opportunity offered to ask questions. Prescriptions sent to patients preferred pharmacy. All belongings sent with patient.    Max Wallace RN

## 2021-02-11 NOTE — PHARMACY - DISCHARGE MEDICATION RECONCILIATION AND EDUCATION
Pharmacy:  Discharge Counseling and Medication Reconciliation    Maxx Canales  [address] [place] [address]  [phone] (home)   65 year old male  PCP: Alison Duffy    Allergies: Atorvastatin    Discharge Counseling:    Pharmacist met with patient  today to review the medication portion of the After Visit Summary (with an emphasis on NEW medications) and to address patient's questions/concerns.    Summary of Education: Counseled patient on new medications of Cefuroxime and Senna-docusate, including dose, indication, administration, and possible common side effects.    ERx originally sent for Cefuroxime 250 mg BID x4 days, discussed treatment options with MD, and at that time Cefpodoxime Rx was sent. Called [facility], and they said Cefpodoxime Rx was not covered by insurance, and cost was $160. Discussed further with MD, and new Rx for 10-day course of Cefuroxime was sent to [facility]. Both [facility] and patient were notified of changes.    Materials Provided:  MedCounselor sheets printed from Clinical Pharmacology on: Senna-docusate  **Patient originally given Cefpodoxime information handout- Change to Cefuroxime was made after discharge- so thus had to call patient and alert of change, thus Cefuroxime handout was not given.    Discharge Medication Reconciliation:    It has been determined that the patient has an adequate supply of medications available or which can be obtained from the patient's preferred pharmacy, which he has confirmed as: [facility].    Thank you for the consult.    Iron Stein [facility]........February 11, 2021 12:06 PM

## 2021-02-11 NOTE — PROGRESS NOTES
Patient was seen in his room on his bed with rigors.  Patient stated that he felt cold was well.  Vitas signs assessed.  See flow sheet.  Patient was found to have a fever of 101.5.  Heart sounds were regular, lung sounds were clear and equal bilaterally.  ABD was round, firm and distended.  Bowel sounds were hypoactive.  Primary nurse notified of finding.  Kandi Smith RN on 2/10/2021 at 6:08 PM

## 2021-02-11 NOTE — PROGRESS NOTES
SAFETY CHECKLIST  ID Bands and Risk clasps correct and in place (DNR, Fall risk, Allergy, Latex, Limb):  Yes  All Lines Reconciled and labeled correctly: Yes  Whiteboard updated:Yes  Environmental interventions (bed/chair alarm on, call light, side rails, restraints, sitter....): Yes  Verify Tele #:     Gilda Anderson RN on 2/10/2021 at 8:25 PM

## 2021-02-11 NOTE — PROGRESS NOTES
"Writer notified of pt change. See nurse note. Pt temp 101.5. Abd is firm, BS hypoactive. Bladder scan 42 ml. Dr. Denton notified of pt change. PRN tylenol given. Will continue to monitor.     Marisel Blue on 2/10/2021 at 6:09 PM      BP (!) 162/88   Pulse 99   Temp 101.5  F (38.6  C) (Tympanic)   Resp 22   Ht 1.676 m (5' 6\")   SpO2 99%   BMI 37.12 kg/m      "

## 2021-02-15 ENCOUNTER — TELEPHONE (OUTPATIENT)
Dept: FAMILY MEDICINE | Facility: OTHER | Age: 66
End: 2021-02-15

## 2021-02-15 ENCOUNTER — OFFICE VISIT (OUTPATIENT)
Dept: UROLOGY | Facility: OTHER | Age: 66
End: 2021-02-15
Attending: UROLOGY
Payer: COMMERCIAL

## 2021-02-15 VITALS
SYSTOLIC BLOOD PRESSURE: 138 MMHG | DIASTOLIC BLOOD PRESSURE: 86 MMHG | WEIGHT: 222 LBS | BODY MASS INDEX: 35.83 KG/M2 | HEART RATE: 80 BPM | RESPIRATION RATE: 20 BRPM

## 2021-02-15 DIAGNOSIS — Z20.822 SUSPECTED COVID-19 VIRUS INFECTION: Primary | ICD-10-CM

## 2021-02-15 DIAGNOSIS — N20.1 LEFT URETERAL STONE: Primary | ICD-10-CM

## 2021-02-15 LAB
BACTERIA SPEC CULT: NORMAL
BACTERIA SPEC CULT: NORMAL
SPECIMEN SOURCE: NORMAL
SPECIMEN SOURCE: NORMAL

## 2021-02-15 PROCEDURE — 99214 OFFICE O/P EST MOD 30 MIN: CPT | Mod: TEL | Performed by: UROLOGY

## 2021-02-15 RX ORDER — ALPRAZOLAM 0.5 MG
TABLET ORAL
Qty: 2 TABLET | Refills: 0 | Status: SHIPPED | OUTPATIENT
Start: 2021-02-15 | End: 2021-04-05

## 2021-02-15 ASSESSMENT — PAIN SCALES - GENERAL: PAINLEVEL: NO PAIN (0)

## 2021-02-15 NOTE — PROGRESS NOTES
Type of Note  EST- Preop    HPI  The patient is a 65 year old male who follows up after undergoing left-sided stent placement for an obstructing stone in the setting of pyelonephritis.  The patient was admitted for 2 nights last week.  He presented initially with flank pain.  Upon further testing it was clear he had infected urine.  CT imaging revealed the stone and he underwent urgent stent placement.  He has undergone stone surgery in the past multiple times.  He is tolerating the stent well.  He denies recent fevers or chills.  He is continuing the course of antibiotics that were prescribed.  He does not require narcotic pain medication at this time for stent symptoms.  He is getting by with NSAIDs.      Past Medical History  He  has a past medical history of Class 1 obesity with serious comorbidity and body mass index (BMI) of 33.0 to 33.9 in adult, unspecified obesity type (9/28/2017), Diverticulitis of colon (10/10/2013), Dysmetabolic syndrome X (10/3/2007), Erectile dysfunction, unspecified erectile dysfunction type (10/2/2018), Essential hypertension (4/25/2003), Partially resolved traumatic cataract of right eye (1/18/2018), Primary localized osteoarthrosis, lower leg (2/19/2013), Pure hypercholesterolemia (5/12/2003), and Thalassemia (10/3/2007).  Patient Active Problem List   Diagnosis     Diverticulitis of colon     Class 1 obesity with serious comorbidity and body mass index (BMI) of 33.0 to 33.9 in adult, unspecified obesity type     Dysmetabolic syndrome X     Erectile dysfunction, unspecified erectile dysfunction type     Essential hypertension     Partially resolved traumatic cataract of right eye     Primary localized osteoarthrosis, lower leg     Pure hypercholesterolemia     Thalassemia     Elevated red blood cell count     Acute pyelonephritis     Sepsis without acute organ dysfunction, due to unspecified organism (H)     Hydronephrosis of left kidney     Renal lithiasis       Past Surgical  History  He  has a past surgical history that includes Eye surgery (); Abdomen surgery (); Colonoscopy (10/21/2013); Colonoscopy (N/A, 10/4/2018); IR Renal Stone Removal Right (); vasectomy; and Cystoscopy, insert stent urethra, combined (Left, 2021).    Medications    Current Outpatient Medications:      ALPRAZolam (XANAX) 0.5 MG tablet, Take one tablet 1 hour prior to procedure, take additional tablet 30 minutes later if needed.  Must have a ., Disp: 2 tablet, Rfl: 0     ASPIRIN PO, Take 81 mg by mouth daily, Disp: , Rfl:      cefuroxime (CEFTIN) 250 MG tablet, Take 1 tablet (250 mg) by mouth every 12 hours for 10 days, Disp: 20 tablet, Rfl: 0     cholecalciferol (VITAMIN D3) 5000 units (125 mcg) capsule, Take 125 mcg by mouth daily , Disp: , Rfl:      lisinopril (ZESTRIL) 40 MG tablet, Take 1 tablet (40 mg) by mouth daily, Disp: 90 tablet, Rfl: 3     Multiple Vitamins-Minerals (MULTIVITAMIN OR), Take 1 tablet by mouth daily , Disp: , Rfl:      senna-docusate (SENOKOT-S/PERICOLACE) 8.6-50 MG tablet, Take 1 tablet by mouth 2 times daily, Disp: 30 tablet, Rfl: 0     simvastatin (ZOCOR) 80 MG tablet, Take 80 mg by mouth daily, Disp: , Rfl:     Allergies  Allergies   Allergen Reactions     Atorvastatin      Muscle pain       Social History  He  reports that he quit smoking about 22 years ago. His smoking use included cigarettes. He quit smokeless tobacco use about 11 years ago.  His smokeless tobacco use included chew. He reports current alcohol use. He reports that he does not use drugs.  No drug abuse.    Family History  Family History   Problem Relation Age of Onset     Diabetes Mother         healthy in 90s     Heart Disease Father 49        5 MIs,  of MI       Review of Systems  I personally reviewed the ROS with the patient.    Recent fever/chills: No  Night sweats: No   Current skin rash: No   Recent hair loss: No   Heat intolerance: No   Cold intolerance: No   Chest pain: No    Palpitations: No   Shortness of breath: No  Wheezing: No   Constipation: No   Diarrhea: No   Nausea: No    Vomiting: No   Kidney/side pain: No   Back pain: No  Frequent headaches: No  Dizziness: No   Leg swelling: No   Calf pain: No      Physical Exam  Vitals:    02/15/21 0756   BP: 138/86   BP Location: Left arm   Patient Position: Sitting   Cuff Size: Adult Regular   Pulse: 80   Resp: 20   Weight: 100.7 kg (222 lb)     Constitutional: NAD, WDWN.   Head: NCAT  Eyes: Conjunctivae normal  Cardiovascular: Regular rate and rhythm  Pulmonary/Chest: Clear to auscultation bilaterally  Abdominal: Soft. No distension, tenderness, masses or guarding.  Neurological: A + O x 3.  Cranial Nerves II-XII grossly intact.  Extremities: ABDIAS x 4, Warm. No clubbing.  No cyanosis.    Skin: Pink, warm and dry.  No rashes noted.  Psychiatric:  Normal mood and affect    Labs  Results for CHARLINE CANALES (MRN [id number]) as of 2/15/2021 08:06   2/9/2021 10:55   Color Urine Yellow   Appearance Urine Cloudy   Glucose Urine Negative   Bilirubin Urine Negative   Ketones Urine Negative   Specific Gravity Urine 1.019   pH Urine 6.0   Protein Albumin Urine 30 (A)   Urobilinogen mg/dL Normal   Nitrite Urine Negative   Blood Urine Small (A)   Leukocyte Esterase Urine Large (A)   Source Midstream Urine   WBC Urine >182 (H)   RBC Urine 17 (H)   Bacteria Urine Many (A)   WBC Clumps Present (A)   Squamous Epithelial /HPF Urine 1   Mucous Urine Present (A)   Amorphous Crystals Few (A)     Radiographic Studies  2/9/2021  CT a/p  IMPRESSION: There is a large distal left ureteral calculus measuring 6  x 8 x 10 mm in diameter position 2 cm above the ureterovesical  junction with left-sided hydroureter and hydronephrosis.     Smaller nonobstructing calculi are present bilaterally.    Assessment  Mr. Canales is a 65 year old male follows up with left ureteral stone and pyelonephritis managed with a stent and antibiotics.    Discussed the treatment options  "for the ureteral stone including ureteroscopy with laser lithotripsy.    After explaining the risks, benefits, and alternatives a decision was made to proceed with ureteroscopy/laser lithotripsy.    The patient was explained the specific risks of bleeding, pain, infection, and ureteral injury.    In addition, the patient was told a stent may need to be placed which could result in urinary frequency, urgency, dysuria, and flank pain with voiding.    It would require an office based procedure to remove it at a later date.    Finally, the patient was told if the stone was very impacted, that we would place a stent and return at a later date to treat the stone.      Plan  The patient was scheduled and consented for \"left ureteroscopy with holmium laser lithotripsy and stent placement\" in the OR (LMA general anesthesia).   -COVID testing  "

## 2021-02-15 NOTE — NURSING NOTE
Review of Systems:    Weight loss:    No     Recent fever/chills:  No   Night sweats:   Yes  Current skin rash:  No   Recent hair loss:  No  Heat intolerance:  No   Cold intolerance:  No  Chest pain:   No   Palpitations:   No  Shortness of breath:  No   Wheezing:   No  Constipation:    Yes   Diarrhea:   No   Nausea:   No   Vomiting:   No   Kidney/side pain:  No   Back pain:   No  Frequent headaches:  No   Dizziness:     No  Leg swelling:   No   Calf pain:    No

## 2021-02-18 ENCOUNTER — ANESTHESIA EVENT (OUTPATIENT)
Dept: SURGERY | Facility: OTHER | Age: 66
End: 2021-02-18
Payer: COMMERCIAL

## 2021-02-19 ENCOUNTER — HOSPITAL ENCOUNTER (OUTPATIENT)
Dept: GENERAL RADIOLOGY | Facility: OTHER | Age: 66
End: 2021-02-19
Attending: UROLOGY | Admitting: UROLOGY
Payer: COMMERCIAL

## 2021-02-19 ENCOUNTER — HOSPITAL ENCOUNTER (OUTPATIENT)
Facility: OTHER | Age: 66
Discharge: HOME | End: 2021-02-19
Attending: UROLOGY | Admitting: UROLOGY
Payer: COMMERCIAL

## 2021-02-19 ENCOUNTER — ANESTHESIA (OUTPATIENT)
Dept: SURGERY | Facility: OTHER | Age: 66
End: 2021-02-19
Payer: COMMERCIAL

## 2021-02-19 VITALS
WEIGHT: 215 LBS | BODY MASS INDEX: 34.55 KG/M2 | HEIGHT: 66 IN | RESPIRATION RATE: 18 BRPM | DIASTOLIC BLOOD PRESSURE: 71 MMHG | TEMPERATURE: 96.4 F | OXYGEN SATURATION: 93 % | HEART RATE: 78 BPM | SYSTOLIC BLOOD PRESSURE: 120 MMHG

## 2021-02-19 DIAGNOSIS — N20.0 KIDNEY STONE ON LEFT SIDE: ICD-10-CM

## 2021-02-19 DIAGNOSIS — N20.0 RENAL LITHIASIS: Primary | ICD-10-CM

## 2021-02-19 LAB
LABORATORY COMMENT REPORT: NORMAL
SARS-COV-2 RNA RESP QL NAA+PROBE: NEGATIVE
SPECIMEN SOURCE: NORMAL

## 2021-02-19 PROCEDURE — 999N000141 HC STATISTIC PRE-PROCEDURE NURSING ASSESSMENT: Performed by: UROLOGY

## 2021-02-19 PROCEDURE — 52356 CYSTO/URETERO W/LITHOTRIPSY: CPT | Performed by: UROLOGY

## 2021-02-19 PROCEDURE — 710N000010 HC RECOVERY PHASE 1, LEVEL 2, PER MIN: Performed by: UROLOGY

## 2021-02-19 PROCEDURE — 52356 CYSTO/URETERO W/LITHOTRIPSY: CPT | Performed by: NURSE ANESTHETIST, CERTIFIED REGISTERED

## 2021-02-19 PROCEDURE — 272N000001 HC OR GENERAL SUPPLY STERILE: Performed by: UROLOGY

## 2021-02-19 PROCEDURE — 360N000083 HC SURGERY LEVEL 3 W/ FLUORO, PER MIN: Performed by: UROLOGY

## 2021-02-19 PROCEDURE — C1894 INTRO/SHEATH, NON-LASER: HCPCS | Performed by: UROLOGY

## 2021-02-19 PROCEDURE — 250N000011 HC RX IP 250 OP 636: Performed by: NURSE ANESTHETIST, CERTIFIED REGISTERED

## 2021-02-19 PROCEDURE — C9803 HOPD COVID-19 SPEC COLLECT: HCPCS

## 2021-02-19 PROCEDURE — C1769 GUIDE WIRE: HCPCS | Performed by: UROLOGY

## 2021-02-19 PROCEDURE — U0005 INFEC AGEN DETEC AMPLI PROBE: HCPCS | Performed by: NURSE ANESTHETIST, CERTIFIED REGISTERED

## 2021-02-19 PROCEDURE — 250N000009 HC RX 250: Performed by: NURSE ANESTHETIST, CERTIFIED REGISTERED

## 2021-02-19 PROCEDURE — U0003 INFECTIOUS AGENT DETECTION BY NUCLEIC ACID (DNA OR RNA); SEVERE ACUTE RESPIRATORY SYNDROME CORONAVIRUS 2 (SARS-COV-2) (CORONAVIRUS DISEASE [COVID-19]), AMPLIFIED PROBE TECHNIQUE, MAKING USE OF HIGH THROUGHPUT TECHNOLOGIES AS DESCRIBED BY CMS-2020-01-R: HCPCS | Performed by: NURSE ANESTHETIST, CERTIFIED REGISTERED

## 2021-02-19 PROCEDURE — 250N000011 HC RX IP 250 OP 636: Performed by: UROLOGY

## 2021-02-19 PROCEDURE — 250N000025 HC SEVOFLURANE, PER MIN: Performed by: UROLOGY

## 2021-02-19 PROCEDURE — 250N000026 HC DESFLURANE, PER MIN: Performed by: UROLOGY

## 2021-02-19 PROCEDURE — 52353 CYSTOURETERO W/LITHOTRIPSY: CPT | Mod: XU | Performed by: UROLOGY

## 2021-02-19 PROCEDURE — 255N000002 HC RX 255 OP 636: Performed by: UROLOGY

## 2021-02-19 PROCEDURE — 710N000012 HC RECOVERY PHASE 2, PER MINUTE: Performed by: UROLOGY

## 2021-02-19 PROCEDURE — 999N000180 XR SURGERY CARM FLUORO LESS THAN 5 MIN: Mod: TC

## 2021-02-19 PROCEDURE — 258N000003 HC RX IP 258 OP 636: Performed by: UROLOGY

## 2021-02-19 PROCEDURE — 74420 UROGRAPHY RTRGR +-KUB: CPT | Mod: 25 | Performed by: UROLOGY

## 2021-02-19 PROCEDURE — 370N000017 HC ANESTHESIA TECHNICAL FEE, PER MIN: Performed by: UROLOGY

## 2021-02-19 PROCEDURE — 82365 CALCULUS SPECTROSCOPY: CPT | Performed by: UROLOGY

## 2021-02-19 PROCEDURE — 258N000001 HC RX 258: Performed by: UROLOGY

## 2021-02-19 PROCEDURE — C2617 STENT, NON-COR, TEM W/O DEL: HCPCS | Performed by: UROLOGY

## 2021-02-19 PROCEDURE — C1758 CATHETER, URETERAL: HCPCS | Performed by: UROLOGY

## 2021-02-19 DEVICE — URETERAL STENT
Type: IMPLANTABLE DEVICE | Site: URETHRA | Status: FUNCTIONAL
Brand: CONTOUR™

## 2021-02-19 RX ORDER — NALOXONE HYDROCHLORIDE 0.4 MG/ML
0.2 INJECTION, SOLUTION INTRAMUSCULAR; INTRAVENOUS; SUBCUTANEOUS
Status: DISCONTINUED | OUTPATIENT
Start: 2021-02-19 | End: 2021-02-19 | Stop reason: HOSPADM

## 2021-02-19 RX ORDER — SODIUM CHLORIDE, SODIUM LACTATE, POTASSIUM CHLORIDE, CALCIUM CHLORIDE 600; 310; 30; 20 MG/100ML; MG/100ML; MG/100ML; MG/100ML
INJECTION, SOLUTION INTRAVENOUS CONTINUOUS
Status: DISCONTINUED | OUTPATIENT
Start: 2021-02-19 | End: 2021-02-19 | Stop reason: HOSPADM

## 2021-02-19 RX ORDER — ONDANSETRON 2 MG/ML
4 INJECTION INTRAMUSCULAR; INTRAVENOUS EVERY 30 MIN PRN
Status: DISCONTINUED | OUTPATIENT
Start: 2021-02-19 | End: 2021-02-19 | Stop reason: HOSPADM

## 2021-02-19 RX ORDER — ONDANSETRON 2 MG/ML
INJECTION INTRAMUSCULAR; INTRAVENOUS PRN
Status: DISCONTINUED | OUTPATIENT
Start: 2021-02-19 | End: 2021-02-19

## 2021-02-19 RX ORDER — HYDROCODONE BITARTRATE AND ACETAMINOPHEN 5; 325 MG/1; MG/1
1-2 TABLET ORAL EVERY 4 HOURS PRN
Qty: 20 TABLET | Refills: 0 | Status: SHIPPED | OUTPATIENT
Start: 2021-02-19 | End: 2021-04-05

## 2021-02-19 RX ORDER — FENTANYL CITRATE 50 UG/ML
INJECTION, SOLUTION INTRAMUSCULAR; INTRAVENOUS PRN
Status: DISCONTINUED | OUTPATIENT
Start: 2021-02-19 | End: 2021-02-19

## 2021-02-19 RX ORDER — DEXAMETHASONE SODIUM PHOSPHATE 4 MG/ML
INJECTION, SOLUTION INTRA-ARTICULAR; INTRALESIONAL; INTRAMUSCULAR; INTRAVENOUS; SOFT TISSUE PRN
Status: DISCONTINUED | OUTPATIENT
Start: 2021-02-19 | End: 2021-02-19

## 2021-02-19 RX ORDER — NALOXONE HYDROCHLORIDE 0.4 MG/ML
0.4 INJECTION, SOLUTION INTRAMUSCULAR; INTRAVENOUS; SUBCUTANEOUS
Status: DISCONTINUED | OUTPATIENT
Start: 2021-02-19 | End: 2021-02-19 | Stop reason: HOSPADM

## 2021-02-19 RX ORDER — KETOROLAC TROMETHAMINE 30 MG/ML
INJECTION, SOLUTION INTRAMUSCULAR; INTRAVENOUS PRN
Status: DISCONTINUED | OUTPATIENT
Start: 2021-02-19 | End: 2021-02-19

## 2021-02-19 RX ORDER — SODIUM CHLORIDE 9 MG/ML
INJECTION, SOLUTION INTRAVENOUS CONTINUOUS
Status: DISCONTINUED | OUTPATIENT
Start: 2021-02-19 | End: 2021-02-19 | Stop reason: HOSPADM

## 2021-02-19 RX ORDER — CIPROFLOXACIN 2 MG/ML
400 INJECTION, SOLUTION INTRAVENOUS ONCE
Status: COMPLETED | OUTPATIENT
Start: 2021-02-19 | End: 2021-02-19

## 2021-02-19 RX ORDER — LIDOCAINE 40 MG/G
CREAM TOPICAL
Status: DISCONTINUED | OUTPATIENT
Start: 2021-02-19 | End: 2021-02-19 | Stop reason: HOSPADM

## 2021-02-19 RX ORDER — FENTANYL CITRATE 50 UG/ML
25-50 INJECTION, SOLUTION INTRAMUSCULAR; INTRAVENOUS
Status: DISCONTINUED | OUTPATIENT
Start: 2021-02-19 | End: 2021-02-19 | Stop reason: HOSPADM

## 2021-02-19 RX ORDER — ONDANSETRON 4 MG/1
4 TABLET, ORALLY DISINTEGRATING ORAL EVERY 30 MIN PRN
Status: DISCONTINUED | OUTPATIENT
Start: 2021-02-19 | End: 2021-02-19 | Stop reason: HOSPADM

## 2021-02-19 RX ORDER — PROPOFOL 10 MG/ML
INJECTION, EMULSION INTRAVENOUS PRN
Status: DISCONTINUED | OUTPATIENT
Start: 2021-02-19 | End: 2021-02-19

## 2021-02-19 RX ORDER — LIDOCAINE HYDROCHLORIDE 20 MG/ML
INJECTION, SOLUTION INFILTRATION; PERINEURAL PRN
Status: DISCONTINUED | OUTPATIENT
Start: 2021-02-19 | End: 2021-02-19

## 2021-02-19 RX ADMIN — LIDOCAINE HYDROCHLORIDE 0.1 ML: 10 INJECTION, SOLUTION EPIDURAL; INFILTRATION; INTRACAUDAL; PERINEURAL at 07:07

## 2021-02-19 RX ADMIN — DEXAMETHASONE SODIUM PHOSPHATE 4 MG: 4 INJECTION, SOLUTION INTRA-ARTICULAR; INTRALESIONAL; INTRAMUSCULAR; INTRAVENOUS; SOFT TISSUE at 08:04

## 2021-02-19 RX ADMIN — FENTANYL CITRATE 50 MCG: 50 INJECTION, SOLUTION INTRAMUSCULAR; INTRAVENOUS at 08:22

## 2021-02-19 RX ADMIN — FENTANYL CITRATE 50 MCG: 50 INJECTION, SOLUTION INTRAMUSCULAR; INTRAVENOUS at 08:02

## 2021-02-19 RX ADMIN — ONDANSETRON 4 MG: 2 INJECTION INTRAMUSCULAR; INTRAVENOUS at 08:04

## 2021-02-19 RX ADMIN — MIDAZOLAM 2 MG: 1 INJECTION INTRAMUSCULAR; INTRAVENOUS at 08:02

## 2021-02-19 RX ADMIN — LIDOCAINE HYDROCHLORIDE 60 MG: 20 INJECTION, SOLUTION INFILTRATION; PERINEURAL at 08:04

## 2021-02-19 RX ADMIN — CIPROFLOXACIN 400 MG: 2 INJECTION INTRAVENOUS at 08:03

## 2021-02-19 RX ADMIN — PROPOFOL 160 MG: 10 INJECTION, EMULSION INTRAVENOUS at 08:04

## 2021-02-19 RX ADMIN — SODIUM CHLORIDE 10 ML/HR: 9 INJECTION, SOLUTION INTRAVENOUS at 07:03

## 2021-02-19 RX ADMIN — KETOROLAC TROMETHAMINE 15 MG: 30 INJECTION, SOLUTION INTRAMUSCULAR at 08:14

## 2021-02-19 ASSESSMENT — LIFESTYLE VARIABLES: TOBACCO_USE: 1

## 2021-02-19 ASSESSMENT — MIFFLIN-ST. JEOR: SCORE: 1707.98

## 2021-02-19 NOTE — ANESTHESIA CARE TRANSFER NOTE
Patient: Maxx Canales    Procedure(s):  lEFT, URETEROSCOPY, LASER HOLMIUM LITHOTRIPSY AND STENT    Diagnosis: Kidney stone [N20.0]  Diagnosis Additional Information: No value filed.    Anesthesia Type:   General     Note:    Oropharynx: oropharynx clear of all foreign objects  Level of Consciousness: awake  Oxygen Supplementation: face mask    Independent Airway: airway patency satisfactory and stable  Dentition: dentition unchanged  Vital Signs Stable: post-procedure vital signs reviewed and stable  Report to RN Given: handoff report given  Patient transferred to: PACU    Handoff Report: Identifed the Patient, Identified the Reponsible Provider, Reviewed the pertinent medical history, Discussed the surgical course, Reviewed Intra-OP anesthesia mangement and issues during anesthesia, Set expectations for post-procedure period and Allowed opportunity for questions and acknowledgement of understanding      Vitals: (Last set prior to Anesthesia Care Transfer)  CRNA VITALS  2/19/2021 0837 - 2/19/2021 0907      2/19/2021             Pulse:  84    Ht Rate:  84    SpO2:  96 %    Resp Rate (observed):  (!) 1    Resp Rate (set):  10        Electronically Signed By: SEPIDEH Ferreira CRNA  February 19, 2021  9:07 AM

## 2021-02-19 NOTE — ANESTHESIA POSTPROCEDURE EVALUATION
Patient: Maxx Canales    Procedure(s):  lEFT, URETEROSCOPY, LASER HOLMIUM LITHOTRIPSY AND STENT    Diagnosis:Kidney stone [N20.0]  Diagnosis Additional Information: No value filed.    Anesthesia Type:  General    Note:  Disposition: Outpatient   Postop Pain Control: Uneventful            Sign Out: Well controlled pain   PONV: No   Neuro/Psych: Uneventful            Sign Out: Acceptable/Baseline neuro status   Airway/Respiratory: Uneventful            Sign Out: Acceptable/Baseline resp. status   CV/Hemodynamics: Uneventful            Sign Out: Acceptable CV status   Other NRE: NONE   DID A NON-ROUTINE EVENT OCCUR? No         Last vitals:  Vitals:    02/19/21 0928 02/19/21 0932 02/19/21 0945   BP: 118/69 120/79 120/71   Pulse: 75 76 78   Resp: 18     Temp: 97.5  F (36.4  C)  96.4  F (35.8  C)   SpO2: 93% 91% 93%       Last vitals prior to Anesthesia Care Transfer:  CRNA VITALS  2/19/2021 0837 - 2/19/2021 0937      2/19/2021             Pulse:  84    Ht Rate:  84    SpO2:  96 %    Resp Rate (observed):  (!) 1    Resp Rate (set):  10          Electronically Signed By: SEPIDEH Up CRNA  February 19, 2021  12:58 PM

## 2021-02-19 NOTE — OR NURSING
Patient remained pain free.  Patient ate and drank without n/v.Patient voided prior to leaving.     Patient was discharged home via ambulatory to private vehicle with Lupe, wife.   Discharge instructions were reviewed with patient and Lupe, wife.  They verbalized understanding.  All questions answered.  Prescriptions: faxed and picked up by Lupe at [facility] pharmacy.

## 2021-02-19 NOTE — PROGRESS NOTES
PACU Transfer Note    Maxx Canales was transferred to [facility] via cart.  Equipment used for transport:  none.  Accompanied by:  DAREN Martinez  Prescriptions were: printed    PACU Respiratory Event Documentation     1) Episodes of Apnea greater than or equal to 10 seconds: 0    2) Bradypnea - less than 8 breaths per minute: 0    3) Pain score on 0 to 10 scale: 0    4) Pain-sedation mismatch (yes or no): no    5) Repeated 02 desaturation less than 90% (yes or no): no    Anesthesia notified? (yes or no): n/a    Any of the above events occuring repeatedly in separate 30 minute intervals may be considered recurrent PACU respiratory events.    Patient stable and meets phase 1 discharge criteria for transport from PACU.

## 2021-02-19 NOTE — ANESTHESIA PREPROCEDURE EVALUATION
Anesthesia Pre-Procedure Evaluation    Patient: Maxx Canales   MRN: [id number] : 1955        Preoperative Diagnosis: Kidney stone [N20.0]   Procedure : Procedure(s):  lEFT, URETEROSCOPY, LASER HOLMIUM LITHOTRIPSY AND STENT     Past Medical History:   Diagnosis Date     Class 1 obesity with serious comorbidity and body mass index (BMI) of 33.0 to 33.9 in adult, unspecified obesity type 2017     Diverticulitis of colon 10/10/2013     Dysmetabolic syndrome X 10/3/2007     Erectile dysfunction, unspecified erectile dysfunction type 10/2/2018     Essential hypertension 2003    Overview:  IMO Update     Partially resolved traumatic cataract of right eye 2018     Primary localized osteoarthrosis, lower leg 2013     Pure hypercholesterolemia 2003     Thalassemia 10/3/2007      Past Surgical History:   Procedure Laterality Date     ABDOMEN SURGERY      sigmoid resection, diverticulitis     COLONOSCOPY  10/21/2013    Procedure: COLONOSCOPY;  COLONOSCOPY with biopsy/ polypectomy;  Surgeon: Carisa Brito DO;  Location: [facility] OR     COLONOSCOPY N/A 10/4/2018    Procedure: COLONOSCOPY;  COLONOSCOPY WITH POLYPECTOMY;  Surgeon: Mj Parsons MD;  Location: [facility] OR     CYSTOSCOPY, INSERT STENT URETHRA, COMBINED Left 2021    Procedure: Left retrograde pyelogram with stent placement;  Surgeon: Gerson Reeves MD;  Location:  OR     EYE SURGERY  2018    right     IR RENAL STONE REMOVAL RIGHT  2018    8 cm     VASECTOMY        Allergies   Allergen Reactions     Atorvastatin      Muscle pain      Social History     Tobacco Use     Smoking status: Former Smoker     Types: Cigarettes     Quit date: 1999     Years since quittin.1     Smokeless tobacco: Former User     Types: Chew     Quit date:      Tobacco comment: occasional cigar   Substance Use Topics     Alcohol use: Yes     Frequency: 4 or more times a week     Drinks per session: 1 or 2     Comment: 3x week      Wt  Readings from Last 1 Encounters:   02/19/21 97.5 kg (215 lb)        Anesthesia Evaluation   Pt has had prior anesthetic.     No history of anesthetic complications       ROS/MED HX  ENT/Pulmonary:     (+) PO risk factors, snores loudly, hypertension, obese, tobacco use, Past use,     Neurologic:  - neg neurologic ROS     Cardiovascular: Comment: Desmetabolic syndrome X    (+) Dyslipidemia hypertension-----    METS/Exercise Tolerance: >4 METS    Hematologic: Comments: Thalassemia hx    (+) anemia,     Musculoskeletal:   (+) arthritis,     GI/Hepatic:  - neg GI/hepatic ROS     Renal/Genitourinary:     (+) Nephrolithiasis ,     Endo: Comment: Desmetabolic syndrome X    (+) Obesity,     Psychiatric/Substance Use:  - neg psychiatric ROS     Infectious Disease:  - neg infectious disease ROS     Malignancy:  - neg malignancy ROS     Other:  - neg other ROS          Physical Exam    Airway        Mallampati: II   TM distance: > 3 FB   Neck ROM: full   Mouth opening: > 3 cm    Respiratory Devices and Support         Dental  no notable dental history         Cardiovascular   cardiovascular exam normal       Rhythm and rate: regular and normal     Pulmonary   pulmonary exam normal        breath sounds clear to auscultation           OUTSIDE LABS:  CBC:   Lab Results   Component Value Date    WBC 9.7 02/11/2021    WBC 15.0 (H) 02/10/2021    HGB 9.0 (L) 02/11/2021    HGB 9.9 (L) 02/10/2021    HCT 28.2 (L) 02/11/2021    HCT 31.1 (L) 02/10/2021     02/11/2021     02/10/2021     BMP:   Lab Results   Component Value Date     02/11/2021     02/10/2021    POTASSIUM 3.8 02/11/2021    POTASSIUM 3.6 02/10/2021    CHLORIDE 104 02/11/2021    CHLORIDE 104 02/10/2021    CO2 24 02/11/2021    CO2 23 02/10/2021    BUN 9 02/11/2021    BUN 12 02/10/2021    CR 0.90 02/11/2021    CR 1.04 02/10/2021     (H) 02/11/2021     (H) 02/10/2021     COAGS: No results found for: PTT, INR, FIBR  POC: No results found  for: BGM, HCG, HCGS  HEPATIC:   Lab Results   Component Value Date    ALBUMIN 3.6 02/09/2021    PROTTOTAL 7.8 02/09/2021    ALT 25 02/09/2021    AST 17 02/09/2021    ALKPHOS 79 02/09/2021    BILITOTAL 1.0 02/09/2021     OTHER:   Lab Results   Component Value Date    LACT 1.6 02/10/2021    A1C 5.9 (H) 11/09/2020    BABAR 7.9 (L) 02/11/2021    CRP <2.9 05/14/2020       Anesthesia Plan    ASA Status:  3   NPO Status:  NPO Appropriate    Anesthesia Type: General.     - Airway: LMA   Induction: Intravenous, Propofol.   Maintenance: Balanced.        Consents    Anesthesia Plan(s) and associated risks, benefits, and realistic alternatives discussed. Questions answered and patient/representative(s) expressed understanding.     - Discussed with:  Patient      - Extended Intubation/Ventilatory Support Discussed: no Extended Intubation.      - Patient is DNR/DNI Status: No    Use of blood products discussed: Yes.     - Discussed with: Patient.     - Consented: consented to blood products     Postoperative Care    Pain management: IV analgesics.   PONV prophylaxis: Ondansetron (or other 5HT-3)     Comments:                SEPIDEH Up CRNA

## 2021-02-19 NOTE — DISCHARGE INSTRUCTIONS
[facility]  Adult Discharge Orders & Instructions      For 24 hours after surgery:  1. Get plenty of rest.  A responsible adult must stay with you for at least 24 hours after you leave the hospital.   2. You may feel lightheaded.  IF so, sit for a few minutes before standing.  Have someone help you get up.   3. You may have a slight fever. Call the doctor if your fever is over 101 F (38.3 C) (taken under the tongue) or lasts longer than 24 hours.  4. You may have a dry mouth, a sore throat, muscle aches or trouble sleeping.  These should go away after 24 hours.  5. Do not make important or legal decisions.  6.   Do not drive or use heavy equipment.  If you have weakness or tingling, don't drive or use heavy equipment until this feeling goes away.                                                                                                                                                                         To contact a doctor, call    [phone]______________

## 2021-02-19 NOTE — OP NOTE
Preoperative diagnosis  Left ureteral stone  Left kidney stones    Postoperative diagnosis  Left ureteral stone  Left kidney stones    Procedure performed  Left ureteroscopy with holmium-YAG laser lithotripsy and basket extraction of stone fragments  Cystoscopy with left retrograde pyelogram and ureteral stent exchange  Interpretation of retrograde    Surgeon  Gerson Reeves MD    Surgeon(s)/Proceduralist(s) and Assistants (if any)  Surgeon(s):  Gerson Reeves MD  Circulator: Cynthia Sarkar RN; Oriana Alvarenga RN  Scrub Person: Barbi Valentin  X-Ray Technologist: Sharon Bermudez  First Assistant: Alex Howell RN    Specimen(s)  Yes  Stone fragments for biochemical analysis    (EBL) Estimated blood loss (ml)  0    Anesthesia  General    Complications  None    Findings  Cystoscopy revealed no tumors, stones or other mucosal abnormalities.  Retrograde pyelogram revealed a delicate system with identification of the stones seen on imaging.    Indications  65 year old male agreed to undergo the above named procedure after discussion of the alternatives, risks and benefits.    Informed consent was obtained.      Procedure  The patient was taken to the operating room and placed supine on the operating table.  Pre-operative antibiotics were administered.  Bilateral lower extremity SCDs were placed.  After induction of general anesthesia the patient was positioned in dorsal lithotomy, prepped and draped in a sterile fashion.  A time-out was performed.      I passed a 14 Persian flexible cystoscope carefully via urethra into the bladder.  Previously placed stent was removed.  The left ureteral orifice was identified and a Sensor wire was passed retrograde to the level of the kidney and confirmed by fluoroscopy.  The flexible scope was off-loaded and the bladder emptied with a straight catheter.  An 8-10 coaxial dilator was passed without difficulty and then removed.  The MR6A semirigid ureteroscope was passed  carefully along the Sensor wire through the urethra and into the distal ureter.  The stone was encountered and fragmented with a 200-micron holmium YAG laser fiber at laser settings of 0.8 joules and a frequency of 8 Hz.  The fragments were basket extracted.  At the completion of the procedure, all clinically significant fragments were removed from the ureter and only dust-like fragments remained.  I performed a retrograde pyelogram through the semirigid scope then removed the scope over a Superstff wire which was passed to the level of the kidney confirmed by fluoroscopy.  The ureteroscope was withdrawn.      At this point we completed treatment of the ureteral stone and switched our attention to the moderate kidney stone burden in a completely different anatomic location requiring a completely different approach and different equipment..  For this a new and different set of disposable equipment was opened as well as the flexible ureteroscope.    A 11-13, 46-cm access sheath was advanced over the super-stiff wire to level of the UPJ under direct fluoroscopic guidance. The inner stylet and super-stiff wire were removed.  The kidney was entered with the Olympus URF-P6 flexible ureteroscope.  The kidney stone was identified and fragmented with a 200-micron holmium YAG laser fiber at laser settings of 0.8 joules and a frequency of 8 Hz. The fragments were basket extracted. At the completion of the procedure, all clinically significant fragments were removed and only dust-like fragments remained.  The access sheath was removed under direct vision.  Ureteral edema but no obvious obstruction was present.  A 5-Kiswahili catheter was passed over the safety wire and the wire withdrawn.   Contrast was injected into the renal pelvis via the 5-Kiswahili open-ended catheter.  A super-stiff wire was placed and confirmed by fluoroscopy.  A 6 x 26 Kiswahili stent was positioned with the upper end in the upper pole and the lower in the  bladder confirmed by fluoroscopy. The bladder was emptied and the procedure was complete. The patient tolerated the procedure well and was stable throughout    Plan  Follow up in clinic for stent pull in the next week or so.

## 2021-02-24 ENCOUNTER — TRANSFERRED RECORDS (OUTPATIENT)
Dept: HEALTH INFORMATION MANAGEMENT | Facility: CLINIC | Age: 66
End: 2021-02-24

## 2021-02-24 LAB
APPEARANCE STONE: NORMAL
COMPN STONE: NORMAL
NUMBER STONE: NORMAL
SIZE STONE: NORMAL MM
WT STONE: 133 MG

## 2021-03-01 ENCOUNTER — OFFICE VISIT (OUTPATIENT)
Dept: UROLOGY | Facility: OTHER | Age: 66
End: 2021-03-01
Attending: UROLOGY
Payer: COMMERCIAL

## 2021-03-01 VITALS — WEIGHT: 218 LBS | RESPIRATION RATE: 16 BRPM | HEART RATE: 91 BPM | BODY MASS INDEX: 35.19 KG/M2

## 2021-03-01 DIAGNOSIS — N20.1 LEFT URETERAL STONE: Primary | ICD-10-CM

## 2021-03-01 PROCEDURE — 52310 CYSTOSCOPY AND TREATMENT: CPT | Performed by: UROLOGY

## 2021-03-01 PROCEDURE — 99212 OFFICE O/P EST SF 10 MIN: CPT | Mod: 25 | Performed by: UROLOGY

## 2021-03-01 NOTE — NURSING NOTE
Cefuroxime 500mg tablet (2-250mg tablets with same lot # and expiration date) by mouth one time now ordered by Gerson Reeves MD.  Medication administered per verbal order   Lot # [id number]  Exp. 12/31/21  Patient tolerated well.  Felipa Mcrae LPN..................3/1/2021  2:38 PM

## 2021-03-01 NOTE — PATIENT INSTRUCTIONS
Please follow the below generic recommendations to help prevent stones.    If you are interested in undergoing a work up (including blood and urine tests) to determine your patient specific factors for why you form stones and ways to specifically prevent stones in the future, ask Dr Reeves if he hasn't already discussed this with you.      Fluids (Increase)  Please increase your fluid intake   Recommend about ten 10-ounce glasses of fluid per day (avoid dark jose).  Please try and keep your urine clear or pale yellow.    If it is dark yellow or cloudy it is concentrated and you need to drink more fluid.  Try drinking a tall glass of water prior to every snack/meal.    Citrate (Increase)  Citrate prevents stone formation and is naturally found in urine.    It can be increased by adding concentrated lemon juice (4 ounces daily) and/or drinking diluted orange juice (50/50 with water).    Both MinuteMaid Light and Crystal Light also contain citrate and can be helpful for stone prevention.    If you plan to drink sodas, I would recommend Diet/Sunkist and/or Diet/7UP as they contain the most citrate (which is good) compared to the jose such as Coke/Pepsi.      Salt (Decrease)  Try to limit salt intake.  Total daily sodium intake should be less than 1500mg.  If you use salt with cooking don't add any additional salt at the table.    Protein  Limit protein intake to small to moderate portions.  For instance, a steak should be no larger than about the size of a deck of cards.  High protein intake leads to stone formation.    Home Care after Cystoscopy  Follow these guidelines for your care after your procedure.    Activity  No limitations    Bathing or showering  No limitations    Symptoms  You may notice some burning with urination but this usually resolves after 1-2 days.  You may also notice small amounts of blood in your urine.  Please increase water intake for the next few days to help with these  symptoms.    Contacts  General Questions: [phone]  Appointments:  [phone]  Emergencies:  [phone]    When to call the clinic  If you develop any of the following symptoms please call the clinic immediately.  If the clinic is closed please be seen at an urgent care clinic or the Emergency Department.  - Burning with urination that worsens after 2 days  - Unable to urinate causing severe pelvic pain  - Fevers of greater than 101 degrees F  - Flank pain that is not responding to pain medication    Follow up  Please follow up as discussed at the appointment.

## 2021-03-01 NOTE — PROGRESS NOTES
Preoperative diagnosis  Nephrolithiasis    Postoperative diagnosis  Nephrolithiasis    Procedure  Flexible cystourethroscopy with stent removal    Surgeon  Gerson Reeves MD    Anesthesia  2% lidocaine jelly intraurethrally    Complications  None    Indications  65 year old male who is status post ureteroscopy with holmium laser lithotripsy who presents for stent removal.    Procedure  The patient was given one dose of antibiotics. The patient was placed in supine position and was prepped and draped in sterile fashion.  2% lidocaine jelly was bluntly injected per urethra without difficulty. I passed the 14 [place] flexible cystoscope through the urethra and into the bladder.  With the aid of a stent grasper I grasped and removed the stent in its entirety.  The patient tolerated the procedure well.    Plan  Discussed generic dietary approach to stone prevention- provided hand out  Litholink and then follow-up to assess for modifiable metabolic stone risk factors        I spent 10 minutes on this patient's visit (exclusive of separately billed services/procedures) and over half of this time was spent in face-to-face counseling regarding stone prevention:  Prevention strategies with fluids and diet, rationale for a metabolic work up, prognosis and importance of compliance.

## 2021-03-01 NOTE — NURSING NOTE
Patient positioned in supine position, perineum area prepped with chlorhexidene Gluconate and patient draped per sterile technique. Per verbal order read back by Gerson Reeves MD, Urojet 10mL 2% lidocaine jelly to be instilled into urethra.  Urojet- 10ml 2% Lidocaine jelly instilled into the urethra.    Urojet 2%  Lot#: [id number]   Expiration date: 8/2022  : [employer]  NDC: [address]-4    [place] Protocol    A. Pre-procedure verification complete Yes  1-relevant information / documentation available, reviewed and properly matched to the patient; 2-consent accurate and complete, 3-equipment and supplies available    B. Site marking complete N/A  Site marked if not in continuous attendance with patient    C. TIME OUT completed Yes  Time Out was conducted just prior to starting procedure to verify the eight required elements: 1-patient identity, 2-consent accurate and complete, 3-position, 4-correct side/site marked (if applicable), 5-procedure, 6-relevant images / results properly labeled and displayed (if applicable), 7-antibiotics / irrigation fluids (if applicable), 8-safety precautions.    After procedure perineum area rinsed. Discharge instructions reviewed with patient. Patient verbalized understanding of discharge instructions and discharged ambulatory.  Felipa Mcrae LPN..................3/1/2021  1:09 PM

## 2021-03-09 ENCOUNTER — TELEPHONE (OUTPATIENT)
Dept: FAMILY MEDICINE | Facility: OTHER | Age: 66
End: 2021-03-09

## 2021-04-05 ENCOUNTER — OFFICE VISIT (OUTPATIENT)
Dept: UROLOGY | Facility: OTHER | Age: 66
End: 2021-04-05
Attending: UROLOGY
Payer: COMMERCIAL

## 2021-04-05 VITALS
DIASTOLIC BLOOD PRESSURE: 64 MMHG | RESPIRATION RATE: 18 BRPM | WEIGHT: 222 LBS | BODY MASS INDEX: 35.83 KG/M2 | HEART RATE: 88 BPM | SYSTOLIC BLOOD PRESSURE: 120 MMHG

## 2021-04-05 DIAGNOSIS — Z87.442 HISTORY OF KIDNEY STONES: ICD-10-CM

## 2021-04-05 DIAGNOSIS — E66.01 MORBID OBESITY (H): ICD-10-CM

## 2021-04-05 DIAGNOSIS — N20.0 NEPHROLITHIASIS, URIC ACID: Primary | ICD-10-CM

## 2021-04-05 DIAGNOSIS — Z87.442 HISTORY OF KIDNEY STONES: Primary | ICD-10-CM

## 2021-04-05 PROCEDURE — 99214 OFFICE O/P EST MOD 30 MIN: CPT | Performed by: UROLOGY

## 2021-04-05 RX ORDER — POTASSIUM CITRATE 10 MEQ/1
20 TABLET, EXTENDED RELEASE ORAL 2 TIMES DAILY WITH MEALS
Qty: 360 TABLET | Refills: 3 | Status: SHIPPED | OUTPATIENT
Start: 2021-04-05 | End: 2021-04-06

## 2021-04-05 ASSESSMENT — PAIN SCALES - GENERAL: PAINLEVEL: NO PAIN (0)

## 2021-04-05 NOTE — Clinical Note
Dr Duffy,  I think he would benefit greatly from KCitrate supplementation.  He is on an ACE inhibitor.  He would like closer to home follow up after starting the KCitrate.  Would you be able to help him out with a BMP after starting?    Thanks  Gerson

## 2021-04-05 NOTE — NURSING NOTE
Pt presents to clinic for Litholinik results    Review of Systems:    Weight loss:    No     Recent fever/chills:  No   Night sweats:   No  Current skin rash:  No   Recent hair loss:  Yes  Heat intolerance:  No   Cold intolerance:  No  Chest pain:   No   Palpitations:   No  Shortness of breath:  No   Wheezing:   No  Constipation:    No   Diarrhea:   No   Nausea:   No   Vomiting:   No   Kidney/side pain:  No   Back pain:   No  Frequent headaches:  No   Dizziness:     No  Leg swelling:   No   Calf pain:    No

## 2021-04-05 NOTE — PROGRESS NOTES
Type of Visit  Established    Chief Complaint  History of uric acid stones    HPI  Mr. Canales is a 65 year old male follows up with history of kidney stones who recently underwent ureteroscopic intervention.  Patient has a longstanding history of stones going back 30 years.  He has undergone intervention with ureteroscopy numerous times in the past 10 years.  He has never undergone a work-up assessing stone risk.  He recently underwent a 24-hour urine Litholink.  He follows up to review the results.  He has specifically worked on increasing his fluid intake and supplementing with lemon juice.  He denies flank pain or gross hematuria today.    He does take an ACE inhibitor for hypertension.      Review of Systems  I reviewed the ROS with the patient.    Nursing Notes:   Felipa Mcrae, LPN  2021  2:01 PM  Signed  Pt presents to clinic for Litholinik results    Review of Systems:    Weight loss:    No     Recent fever/chills:  No   Night sweats:   No  Current skin rash:  No   Recent hair loss:  Yes  Heat intolerance:  No   Cold intolerance:  No  Chest pain:   No   Palpitations:   No  Shortness of breath:  No   Wheezing:   No  Constipation:    No   Diarrhea:   No   Nausea:   No   Vomiting:   No   Kidney/side pain:  No   Back pain:   No  Frequent headaches:  No   Dizziness:     No  Leg swelling:   No   Calf pain:    No          Family History  Family History   Problem Relation Age of Onset     Diabetes Mother         healthy in 90s     Heart Disease Father 49        5 MIs,  of MI       Physical Exam  Vitals:    21 1349   BP: 120/64   BP Location: Right arm   Patient Position: Sitting   Cuff Size: Adult Large   Pulse: 88   Resp: 18   Weight: 100.7 kg (222 lb)     Constitutional: NAD, WDWN.  Cardiovascular: Regular rate.  Pulmonary/Chest: Respirations are even and non-labored bilaterally.  Abdominal: Soft. No distension, tenderness, masses or guarding. No CVA tenderness.  Extremities: ABDIAS x 4, Warm. No  clubbing.  No cyanosis.    Skin: Pink, warm and dry.  No rashes noted.  Genitourinary: nonpalpable bladder    Labs  2/19/2021  Stone analysis  Uric acid and calcium oxalate hydrate    Litholink  3/17/2021 & 3/18/2021  Volume (L)  3.04-3.38   SS CaOx  1.85-2.47 (6-10)  Urine Calcium  77-78  (male<250)  Urine Oxalate  33-40  (20-40)  Urine Citrate  407-510 (male>450)  SS CaP  0.06-0.11 (0.5-2)  24 hr Urine pH  5.6-5.8  (5.8-6.2)  SS Uric Acid  0.82-0.92 (0-1)  Urine uric acid  0.74-0.81 (<0.8)    (collected while on treatment:  Increased fluids and lemon juice)    Dietary salt is low  Magnesium level was low.    Radiographic Studies  2/9/2021  CT a/p  IMPRESSION: There is a large distal left ureteral calculus measuring 6  x 8 x 10 mm in diameter position 2 cm above the ureterovesical  junction with left-sided hydroureter and hydronephrosis.     Smaller nonobstructing calculi are present bilaterally.    Assessment  Mr. Canales is a 65 year old male with a history of kidney stones who recently underwent a metabolic work-up including a 24-hour urine Litholink.  Reviewed the results today with the patient.  Risk factors: Borderline citrate level, low pH for uric acid stones, low magnesium level    We address these 3 risk factors individually.  I have recommended potassium citrate supplementation to improve the citrate level as well as increase pH for uric acid stone prevention.  Regarding the magnesium level I recommended supplementation.    Plan  MagOx 400mg once daily  Start KCitrate 20 mEq BID due to history of kidney stones   -Patient will follow up with PCP for ongoing K monitoring.  Follow up with me in 1 year

## 2021-04-05 NOTE — Clinical Note
I contacted Dr Duffy and she plans to follow patient for potassium levels.  Please let patient know Dr Duffy agreed to monitor from here.  I can see him in 1 year.

## 2021-04-06 ENCOUNTER — TELEPHONE (OUTPATIENT)
Dept: FAMILY MEDICINE | Facility: OTHER | Age: 66
End: 2021-04-06

## 2021-04-06 ENCOUNTER — TELEPHONE (OUTPATIENT)
Dept: UROLOGY | Facility: OTHER | Age: 66
End: 2021-04-06

## 2021-04-06 RX ORDER — POTASSIUM CITRATE 10 MEQ/1
TABLET, EXTENDED RELEASE ORAL
Qty: 360 TABLET | Refills: 0 | COMMUNITY
Start: 2021-04-06 | End: 2021-07-08

## 2021-04-06 NOTE — TELEPHONE ENCOUNTER
"Per Gerson Reeves MD: \"I contacted Dr Duffy and she plans to follow patient for potassium levels.  Please let patient know Dr Duffy agreed to monitor from here.  I can see him in 1 year.\"  Called patient and the lady who answered said [facility] was unavailable and to try back around 2:30 this afternoon.  Will try to contact patient later.  Shawna Li RN......April 6, 2021...9:58 AM   "

## 2021-04-06 NOTE — TELEPHONE ENCOUNTER
Called pt in regards to having a follow up lab appt to recheck potassium in about 2 weeks, Dr Reeves prescribed Urocit-K 10 MeQ 2 tablets BID. Pt states the RX is $442 for the 90 day prescription and doesn't want to pay that. I spoke with the pharmacist he hasn't met his deductible yet so they recommended Klor con instead and it drops the price to $226 for the 90 day RX. Spoke with patient he is ok with this, will pend for PCP to sign. Will cancel Urocit-K RX. Upon sending to PCP to authorize we realized he needs to be on the potassium citrate to prevent kidney stones. So Klor con won't work for prevention. After speaking with the pharmacist they researched any equivalent to the Urocit-K and didn't have no luck with finding an equivalent. The pharmacist ran the Good RX coupon and it brought the total down to $151. Will re add the medication to her list.

## 2021-04-21 DIAGNOSIS — Z87.442 HISTORY OF KIDNEY STONES: ICD-10-CM

## 2021-04-21 LAB
ANION GAP SERPL CALCULATED.3IONS-SCNC: 4 MMOL/L (ref 3–14)
BUN SERPL-MCNC: 16 MG/DL (ref 7–30)
CALCIUM SERPL-MCNC: 9 MG/DL (ref 8.5–10.1)
CHLORIDE SERPL-SCNC: 106 MMOL/L (ref 94–109)
CO2 SERPL-SCNC: 25 MMOL/L (ref 20–32)
CREAT SERPL-MCNC: 0.93 MG/DL (ref 0.66–1.25)
GFR SERPL CREATININE-BSD FRML MDRD: 86 ML/MIN/{1.73_M2}
GLUCOSE SERPL-MCNC: 113 MG/DL (ref 70–99)
POTASSIUM SERPL-SCNC: 4.4 MMOL/L (ref 3.4–5.3)
SODIUM SERPL-SCNC: 135 MMOL/L (ref 133–144)

## 2021-04-21 PROCEDURE — 80048 BASIC METABOLIC PNL TOTAL CA: CPT | Performed by: FAMILY MEDICINE

## 2021-04-21 PROCEDURE — 36415 COLL VENOUS BLD VENIPUNCTURE: CPT | Performed by: FAMILY MEDICINE

## 2021-07-08 ENCOUNTER — TELEPHONE (OUTPATIENT)
Dept: UROLOGY | Facility: OTHER | Age: 66
End: 2021-07-08

## 2021-07-08 DIAGNOSIS — E88.810 DYSMETABOLIC SYNDROME X: Primary | ICD-10-CM

## 2021-07-08 RX ORDER — POTASSIUM CITRATE 10 MEQ/1
TABLET, EXTENDED RELEASE ORAL
Qty: 360 TABLET | Refills: 0 | Status: SHIPPED | OUTPATIENT
Start: 2021-07-08 | End: 2021-10-12

## 2021-07-08 NOTE — TELEPHONE ENCOUNTER
Jeet called re script for Potassium Citrate.  States that he is going to be out of the meds tomorrow and is wondering if he should continue taking this medication.  States that [facility] in [place] states that the script was cancelled.  Jeet is going out of town next week and leaving Monday.  He would like someone to call him back.  He can be reached at [phone].    Please advise,  Thank you,  Dania Ortega on 7/8/2021 at 1:48 PM

## 2021-07-08 NOTE — TELEPHONE ENCOUNTER
Potassium       Last Written Prescription Date: historically entered  Last Fill Quantity:n/a ,   # refills: n/a  Last Office Visit: 02/09/21  Future Office visit:       Routing refill request to provider for review/approval because:  Medication is reported/historical    PCP out of the office

## 2021-07-08 NOTE — TELEPHONE ENCOUNTER
Pt informed that Dr Duffy renewed med in April and is following patients potassium levels.  Advised patient to contact Dr Dfufy's office for refill, Dr Reeves is out of the office until 7/12/21.  Patient was in agreement with plan

## 2021-09-11 ENCOUNTER — HEALTH MAINTENANCE LETTER (OUTPATIENT)
Age: 66
End: 2021-09-11

## 2021-10-12 DIAGNOSIS — E88.810 DYSMETABOLIC SYNDROME X: ICD-10-CM

## 2021-10-12 RX ORDER — POTASSIUM CITRATE 10 MEQ/1
TABLET, EXTENDED RELEASE ORAL
Qty: 360 TABLET | Refills: 1 | Status: SHIPPED | OUTPATIENT
Start: 2021-10-12 | End: 2022-04-27

## 2021-10-12 NOTE — TELEPHONE ENCOUNTER
Potassium       Last Written Prescription Date:  7/8/21  Last Fill Quantity: 360,   # refills: 0  Last Office Visit: 2/9/21  Future Office visit:    Next 5 appointments (look out 90 days)    Dec 16, 2021  8:15 AM  (Arrive by 8:00 AM)  PHYSICAL with Alison Duffy MD  [facility] ([facility] ) [address]  [place] [address]  [phone]           Not on protocol, pt requesting 90 day supply.    Pt reminded he must call his pharmacy in the future for all refill requests allow a minimum of 48 business hours to process. Pt verbalizes understanding.

## 2021-11-15 DIAGNOSIS — E78.00 PURE HYPERCHOLESTEROLEMIA: Primary | ICD-10-CM

## 2021-11-15 DIAGNOSIS — I10 BENIGN ESSENTIAL HYPERTENSION: ICD-10-CM

## 2021-11-15 RX ORDER — LISINOPRIL 40 MG/1
TABLET ORAL
Qty: 90 TABLET | Refills: 0 | Status: SHIPPED | OUTPATIENT
Start: 2021-11-15 | End: 2022-01-19

## 2021-11-15 RX ORDER — SIMVASTATIN 80 MG
TABLET ORAL
Qty: 90 TABLET | Refills: 0 | Status: SHIPPED | OUTPATIENT
Start: 2021-11-15 | End: 2022-01-19

## 2021-11-15 NOTE — TELEPHONE ENCOUNTER
Lisinopril      Last Written Prescription Date:  11/9/20  Last Fill Quantity: 90,   # refills: 3  Last Office Visit: 2/9/21  Future Office visit:    Next 5 appointments (look out 90 days)    Dec 16, 2021  8:15 AM  (Arrive by 8:00 AM)  PHYSICAL with [facility] MD  [facility] [address] [facility] [address]  [phone]           Routing refill request to provider for review/approval because:      Simvastatin      Last Written Prescription Date:  0  Last Fill Quantity: 0,   # refills: 0  Last Office Visit: 2/9/21  Future Office visit:    Next 5 appointments (look out 90 days)    Dec 16, 2021  8:15 AM  (Arrive by 8:00 AM)  PHYSICAL with Alison Duffy MD  [facility] ([facility] ) [address] [facility] [place]  [facility] [address]  [phone]           Routing refill request to provider for review/approval because:  Medication is reported/historical

## 2021-11-22 NOTE — PROGRESS NOTES
"SUBJECTIVE:   CC: Maxx Canales is an 65 year old male who presents for preventative health visit.       Patient has been advised of split billing requirements and indicates understanding: Yes  Healthy Habits:     In general, how would you rate your overall health?  Excellent    Frequency of exercise:  6-7 days/week    Duration of exercise:  30-45 minutes    Do you usually eat at least 4 servings of fruit and vegetables a day, include whole grains    & fiber and avoid regularly eating high fat or \"junk\" foods?  Yes    Taking medications regularly:  Yes    Medication side effects:  None    Ability to successfully perform activities of daily living:  No assistance needed    Home Safety:  No safety concerns identified    Hearing Impairment:  No hearing concerns    In the past 6 months, have you been bothered by leaking of urine?  No    In general, how would you rate your overall mental or emotional health?  Excellent      PHQ-2 Total Score: 0    Additional concerns today:  No          Hyperlipidemia Follow-Up      Are you regularly taking any medication or supplement to lower your cholesterol?   Yes- Simvastatin    Are you having muscle aches or other side effects that you think could be caused by your cholesterol lowering medication?  No    Hypertension Follow-up      Do you check your blood pressure regularly outside of the clinic? No     Are you following a low salt diet? Yes    Are your blood pressures ever more than 140 on the top number (systolic) OR more   than 90 on the bottom number (diastolic), for example 140/90? No    Jeet is doing well, working with Dr Reeves, with his nephrolithiasis, taking potassium citrate, magnesium citrate, and drinking four 32 ounce containers of lemon water daily. He had a severe pyelonephritis last February and had stones removed after that time.     Today's PHQ-2 Score:   PHQ-2 ( 1999 [employer]) 12/16/2021   Q1: Little interest or pleasure in doing things 0   Q2: Feeling down, " depressed or hopeless 0   PHQ-2 Score 0   PHQ-2 Total Score (12-17 Years)- Positive if 3 or more points; Administer PHQ-A if positive -   Q1: Little interest or pleasure in doing things Not at all   Q2: Feeling down, depressed or hopeless Not at all   PHQ-2 Score 0       Abuse: Current or Past(Physical, Sexual or Emotional)- No  Do you feel safe in your environment? Yes        Social History     Tobacco Use     Smoking status: Former Smoker     Types: Cigarettes     Quit date: 1999     Years since quittin.9     Smokeless tobacco: Former User     Types: Chew     Quit date:      Tobacco comment: occasional cigar   Substance Use Topics     Alcohol use: Yes     Comment: 3x week     If you drink alcohol do you typically have >3 drinks per day or >7 drinks per week? No    Alcohol Use 2021   Prescreen: >3 drinks/day or >7 drinks/week? No   Prescreen: >3 drinks/day or >7 drinks/week? -   No flowsheet data found.    Last PSA:   PSA   Date Value Ref Range Status   2020 0.34 0 - 4 ug/L Final     Comment:     Assay Method:  Chemiluminescence using Siemens Vista analyzer       Reviewed orders with patient. Reviewed health maintenance and updated orders accordingly - Yes  BP Readings from Last 3 Encounters:   21 128/64   21 120/64   21 120/71    Wt Readings from Last 3 Encounters:   21 100.7 kg (222 lb)   21 100.7 kg (222 lb)   21 98.9 kg (218 lb)                  Patient Active Problem List   Diagnosis     Diverticulitis of colon     Class 1 obesity with serious comorbidity and body mass index (BMI) of 33.0 to 33.9 in adult, unspecified obesity type     Dysmetabolic syndrome X     Erectile dysfunction, unspecified erectile dysfunction type     Essential hypertension     Partially resolved traumatic cataract of right eye     Primary localized osteoarthrosis, lower leg     Pure hypercholesterolemia     Thalassemia     Elevated red blood cell count     Acute  pyelonephritis     Sepsis without acute organ dysfunction, due to unspecified organism (H)     Hydronephrosis of left kidney     Renal lithiasis     History of kidney stones     Morbid obesity (H)     Past Surgical History:   Procedure Laterality Date     ABDOMEN SURGERY      sigmoid resection, diverticulitis     COLONOSCOPY  10/21/2013    Procedure: COLONOSCOPY;  COLONOSCOPY with biopsy/ polypectomy;  Surgeon: Carisa Brito DO;  Location: [facility]     COLONOSCOPY N/A 10/4/2018    Procedure: COLONOSCOPY;  COLONOSCOPY WITH POLYPECTOMY;  Surgeon: Mj Parsons MD;  Location: [facility]     COMBINED CYSTOSCOPY, URETEROSCOPY, LASER HOLMIUM LITHOTRIPSY URETER(S) Left 2021    Procedure: lEFT, URETEROSCOPY, LASER HOLMIUM LITHOTRIPSY AND STENT;  Surgeon: Gerson Reeves MD;  Location:  OR     CYSTOSCOPY, INSERT STENT URETHRA, COMBINED Left 2021    Procedure: Left retrograde pyelogram with stent placement;  Surgeon: Gerson Reeves MD;  Location:  OR     EYE SURGERY  2018    right     IR RENAL STONE REMOVAL RIGHT  2018    8 cm     VASECTOMY         Social History     Tobacco Use     Smoking status: Former Smoker     Types: Cigarettes     Quit date: 1999     Years since quittin.9     Smokeless tobacco: Former User     Types: Chew     Quit date:      Tobacco comment: occasional cigar   Substance Use Topics     Alcohol use: Yes     Comment: 3x week     Family History   Problem Relation Age of Onset     Diabetes Mother         healthy in 90s     Heart Disease Father 49        5 MIs,  of MI         Current Outpatient Medications   Medication Sig Dispense Refill     ASPIRIN PO Take 81 mg by mouth daily       cholecalciferol (VITAMIN D3) 5000 units (125 mcg) capsule Take 125 mcg by mouth daily        lisinopril (ZESTRIL) 40 MG tablet Take 1 tablet by mouth once daily 90 tablet 0     Multiple Vitamins-Minerals (MULTIVITAMIN OR) Take 1 tablet by mouth daily        potassium citrate (UROCIT-K) 10 MEQ  (1080 MG) CR tablet Take 2 tablets by mouth  tablet 1     simvastatin (ZOCOR) 80 MG tablet TAKE 1 TABLET BY MOUTH AT BEDTIME 90 tablet 0     Allergies   Allergen Reactions     Atorvastatin      Muscle pain       Reviewed and updated as needed this visit by clinical staff  Tobacco  Allergies  Meds             Reviewed and updated as needed this visit by Provider               Past Medical History:   Diagnosis Date     Class 1 obesity with serious comorbidity and body mass index (BMI) of 33.0 to 33.9 in adult, unspecified obesity type 9/28/2017     Diverticulitis of colon 10/10/2013     Dysmetabolic syndrome X 10/3/2007     Erectile dysfunction, unspecified erectile dysfunction type 10/2/2018     Essential hypertension 4/25/2003    Overview:  IMO Update     Partially resolved traumatic cataract of right eye 1/18/2018     Primary localized osteoarthrosis, lower leg 2/19/2013     Pure hypercholesterolemia 5/12/2003     Thalassemia 10/3/2007      Past Surgical History:   Procedure Laterality Date     ABDOMEN SURGERY  1997    sigmoid resection, diverticulitis     COLONOSCOPY  10/21/2013    Procedure: COLONOSCOPY;  COLONOSCOPY with biopsy/ polypectomy;  Surgeon: Carisa Brito DO;  Location: [facility]     COLONOSCOPY N/A 10/4/2018    Procedure: COLONOSCOPY;  COLONOSCOPY WITH POLYPECTOMY;  Surgeon: Mj Parsons MD;  Location: [facility]     COMBINED CYSTOSCOPY, URETEROSCOPY, LASER HOLMIUM LITHOTRIPSY URETER(S) Left 2/19/2021    Procedure: lEFT, URETEROSCOPY, LASER HOLMIUM LITHOTRIPSY AND STENT;  Surgeon: Gerson Reeves MD;  Location:  OR     CYSTOSCOPY, INSERT STENT URETHRA, COMBINED Left 2/9/2021    Procedure: Left retrograde pyelogram with stent placement;  Surgeon: Gerson Reeves MD;  Location:  OR     EYE SURGERY  2018    right     IR RENAL STONE REMOVAL RIGHT  2018    8 cm     VASECTOMY         Review of Systems  CONSTITUTIONAL: NEGATIVE for fever, chills, change in weight  INTEGUMENTARY/SKIN: NEGATIVE for  "worrisome rashes, moles or lesions  EYES: NEGATIVE for vision changes or irritation  ENT: NEGATIVE for ear, mouth and throat problems  RESP: NEGATIVE for significant cough or SOB  CV: NEGATIVE for chest pain, palpitations or peripheral edema  GI: NEGATIVE for nausea, abdominal pain, heartburn, or change in bowel habits   male: negative for dysuria, hematuria, decreased urinary stream, erectile dysfunction, urethral discharge  MUSCULOSKELETAL: NEGATIVE for significant arthralgias or myalgia  NEURO: NEGATIVE for weakness, dizziness or paresthesias  PSYCHIATRIC: NEGATIVE for changes in mood or affect    OBJECTIVE:   /64   Pulse (!) 46   Temp 97.6  F (36.4  C) (Tympanic)   Resp 19   Ht 1.676 m (5' 6\")   Wt 100.7 kg (222 lb)   SpO2 98%   BMI 35.83 kg/m      Physical Exam  GENERAL: healthy, alert and no distress  EYES: Eyes grossly normal to inspection, PERRL and conjunctivae and sclerae normal  HENT: ear canals and TM's normal, nose and mouth without ulcers or lesions  NECK: no adenopathy, no asymmetry, masses, or scars and thyroid normal to palpation  RESP: lungs clear to auscultation - no rales, rhonchi or wheezes  CV: regular rate and rhythm, normal S1 S2, no S3 or S4, no murmur, click or rub, occasional extra beat,  no peripheral edema and peripheral pulses strong  ABDOMEN: soft, nontender, no hepatosplenomegaly, no masses and bowel sounds normal   (male): normal male genitalia without lesions or urethral discharge, no hernia  MS: no gross musculoskeletal defects noted, no edema  SKIN: no suspicious lesions or rashes  NEURO: Normal strength and tone, mentation intact and speech normal  PSYCH: mentation appears normal, affect normal/bright        ASSESSMENT/PLAN:   (Z00.00) Routine general medical examination at a health care facility  (primary encounter diagnosis)  Comment:   Plan: doing very well     (E88.81) Dysmetabolic syndrome X  Comment:   Plan: CBC with platelets, Comprehensive metabolic       " "  panel        Check lab today    (I10) Essential hypertension  Comment:   Plan: controlled on current medication    (E78.00) Pure hypercholesterolemia  Comment:   Plan: Lipid Profile (Chol, Trig, HDL, LDL calc)        Due for lab     (D56.9) Thalassemia, unspecified type  Comment:   Plan: stable     (Z87.442) History of kidney stones  Comment:   Plan: doing well, working hard to prevent them  Having some occasional urgent stools, discussed this is most likely due to the magnesium citrate - discussed with Dr Reeves at his next appointment in March, 2022      (E66.9,  Z68.33) Class 1 obesity with serious comorbidity and body mass index (BMI) of 33.0 to 33.9 in adult, unspecified obesity type  Comment:   Plan: he is very active, kayaking, walking, shoveling    (Z12.11) Colon cancer screening  Comment:   Plan: Adult General Surg Referral        Due for follow up colonoscopy     (R00.1) Bradycardia  Comment:   Plan: EKG 12-lead complete w/read - (Clinic         Performed)        Check EKG today  No symptoms     (R73.09) Increased glucose level  Comment:   Plan: Hemoglobin A1c        Due for lab     (Z12.5) Screening for prostate cancer  Comment:   Plan: PSA, screen        Lab today     Immunization due  PCV 23 is given today  He has had all Moderna vaccines including booster    COUNSELING:   Reviewed preventive health counseling, as reflected in patient instructions       Regular exercise       Healthy diet/nutrition       Immunizations    Vaccinated for: Pneumococcal             Colon cancer screening       Prostate cancer screening    Estimated body mass index is 35.83 kg/m  as calculated from the following:    Height as of this encounter: 1.676 m (5' 6\").    Weight as of this encounter: 100.7 kg (222 lb).     Weight management plan: Discussed healthy diet and exercise guidelines    He reports that he quit smoking about 22 years ago. His smoking use included cigarettes. He quit smokeless tobacco use about 11 years ago. "  His smokeless tobacco use included chew.      Counseling Resources:  ATP IV Guidelines  Pooled Cohorts Equation Calculator  FRAX Risk Assessment  ICSI Preventive Guidelines  Dietary Guidelines for Americans, 2010  USDA's MyPlate  ASA Prophylaxis  Lung CA Screening    Alison Duffy MD  [facility]

## 2021-12-16 ENCOUNTER — OFFICE VISIT (OUTPATIENT)
Dept: FAMILY MEDICINE | Facility: OTHER | Age: 66
End: 2021-12-16
Attending: FAMILY MEDICINE
Payer: MEDICARE

## 2021-12-16 VITALS
BODY MASS INDEX: 35.68 KG/M2 | HEIGHT: 66 IN | HEART RATE: 46 BPM | DIASTOLIC BLOOD PRESSURE: 64 MMHG | TEMPERATURE: 97.6 F | OXYGEN SATURATION: 98 % | SYSTOLIC BLOOD PRESSURE: 128 MMHG | RESPIRATION RATE: 19 BRPM | WEIGHT: 222 LBS

## 2021-12-16 DIAGNOSIS — E78.00 PURE HYPERCHOLESTEROLEMIA: ICD-10-CM

## 2021-12-16 DIAGNOSIS — E61.1 IRON DEFICIENCY: Primary | ICD-10-CM

## 2021-12-16 DIAGNOSIS — Z87.442 HISTORY OF KIDNEY STONES: ICD-10-CM

## 2021-12-16 DIAGNOSIS — E66.811 CLASS 1 OBESITY WITH SERIOUS COMORBIDITY AND BODY MASS INDEX (BMI) OF 33.0 TO 33.9 IN ADULT, UNSPECIFIED OBESITY TYPE: ICD-10-CM

## 2021-12-16 DIAGNOSIS — Z00.00 ROUTINE GENERAL MEDICAL EXAMINATION AT A HEALTH CARE FACILITY: Primary | ICD-10-CM

## 2021-12-16 DIAGNOSIS — D64.9 ANEMIA, UNSPECIFIED TYPE: Primary | ICD-10-CM

## 2021-12-16 DIAGNOSIS — I10 ESSENTIAL HYPERTENSION: ICD-10-CM

## 2021-12-16 DIAGNOSIS — E88.810 DYSMETABOLIC SYNDROME X: ICD-10-CM

## 2021-12-16 DIAGNOSIS — R00.1 BRADYCARDIA: ICD-10-CM

## 2021-12-16 DIAGNOSIS — I49.3 VENTRICULAR PREMATURE BEATS: ICD-10-CM

## 2021-12-16 DIAGNOSIS — I49.1 SUPRAVENTRICULAR BEAT, PREMATURE: ICD-10-CM

## 2021-12-16 DIAGNOSIS — R73.09 INCREASED GLUCOSE LEVEL: ICD-10-CM

## 2021-12-16 DIAGNOSIS — Z12.11 COLON CANCER SCREENING: ICD-10-CM

## 2021-12-16 DIAGNOSIS — Z12.5 SCREENING FOR PROSTATE CANCER: ICD-10-CM

## 2021-12-16 DIAGNOSIS — D56.9 THALASSEMIA, UNSPECIFIED TYPE: ICD-10-CM

## 2021-12-16 LAB
ALBUMIN SERPL-MCNC: 3.9 G/DL (ref 3.4–5)
ALP SERPL-CCNC: 75 U/L (ref 40–150)
ALT SERPL W P-5'-P-CCNC: 39 U/L (ref 0–70)
ANION GAP SERPL CALCULATED.3IONS-SCNC: 6 MMOL/L (ref 3–14)
AST SERPL W P-5'-P-CCNC: 23 U/L (ref 0–45)
BILIRUB SERPL-MCNC: 0.7 MG/DL (ref 0.2–1.3)
BUN SERPL-MCNC: 11 MG/DL (ref 7–30)
CALCIUM SERPL-MCNC: 9 MG/DL (ref 8.5–10.1)
CHLORIDE BLD-SCNC: 109 MMOL/L (ref 94–109)
CHOLEST SERPL-MCNC: 215 MG/DL
CO2 SERPL-SCNC: 25 MMOL/L (ref 20–32)
CREAT SERPL-MCNC: 0.92 MG/DL (ref 0.66–1.25)
ERYTHROCYTE [DISTWIDTH] IN BLOOD BY AUTOMATED COUNT: 17.4 % (ref 10–15)
EST. AVERAGE GLUCOSE BLD GHB EST-MCNC: 111 MG/DL
FASTING STATUS PATIENT QL REPORTED: YES
GFR SERPL CREATININE-BSD FRML MDRD: 87 ML/MIN/1.73M2
GLUCOSE BLD-MCNC: 118 MG/DL (ref 70–99)
HBA1C MFR BLD: 5.5 % (ref 0–5.6)
HCT VFR BLD AUTO: 39.7 % (ref 40–53)
HDLC SERPL-MCNC: 56 MG/DL
HGB BLD-MCNC: 12.6 G/DL (ref 13.3–17.7)
IRON SATN MFR SERPL: 49 % (ref 15–46)
IRON SERPL-MCNC: 141 UG/DL (ref 35–180)
LDLC SERPL CALC-MCNC: 102 MG/DL
MCH RBC QN AUTO: 20.5 PG (ref 26.5–33)
MCHC RBC AUTO-ENTMCNC: 31.7 G/DL (ref 31.5–36.5)
MCV RBC AUTO: 65 FL (ref 78–100)
NONHDLC SERPL-MCNC: 159 MG/DL
PLATELET # BLD AUTO: 194 10E3/UL (ref 150–450)
POTASSIUM BLD-SCNC: 4.4 MMOL/L (ref 3.4–5.3)
PROT SERPL-MCNC: 7.5 G/DL (ref 6.8–8.8)
PSA SERPL-MCNC: 0.4 UG/L (ref 0–4)
RBC # BLD AUTO: 6.14 10E6/UL (ref 4.4–5.9)
SODIUM SERPL-SCNC: 140 MMOL/L (ref 133–144)
TIBC SERPL-MCNC: 285 UG/DL (ref 240–430)
TRIGL SERPL-MCNC: 287 MG/DL
WBC # BLD AUTO: 7.9 10E3/UL (ref 4–11)

## 2021-12-16 PROCEDURE — 83550 IRON BINDING TEST: CPT | Mod: ZL | Performed by: FAMILY MEDICINE

## 2021-12-16 PROCEDURE — 93005 ELECTROCARDIOGRAM TRACING: CPT

## 2021-12-16 PROCEDURE — G0009 ADMIN PNEUMOCOCCAL VACCINE: HCPCS

## 2021-12-16 PROCEDURE — 99397 PER PM REEVAL EST PAT 65+ YR: CPT | Performed by: FAMILY MEDICINE

## 2021-12-16 PROCEDURE — 83036 HEMOGLOBIN GLYCOSYLATED A1C: CPT | Mod: ZL | Performed by: FAMILY MEDICINE

## 2021-12-16 PROCEDURE — 82040 ASSAY OF SERUM ALBUMIN: CPT | Mod: ZL | Performed by: FAMILY MEDICINE

## 2021-12-16 PROCEDURE — 80061 LIPID PANEL: CPT | Mod: ZL | Performed by: FAMILY MEDICINE

## 2021-12-16 PROCEDURE — 36415 COLL VENOUS BLD VENIPUNCTURE: CPT | Mod: ZL | Performed by: FAMILY MEDICINE

## 2021-12-16 PROCEDURE — 93010 ELECTROCARDIOGRAM REPORT: CPT | Performed by: INTERNAL MEDICINE

## 2021-12-16 PROCEDURE — 85027 COMPLETE CBC AUTOMATED: CPT | Mod: ZL | Performed by: FAMILY MEDICINE

## 2021-12-16 PROCEDURE — G0103 PSA SCREENING: HCPCS | Mod: ZL | Performed by: FAMILY MEDICINE

## 2021-12-16 RX ORDER — FERROUS SULFATE 325(65) MG
325 TABLET ORAL
Qty: 90 TABLET | Refills: 0 | Status: SHIPPED | OUTPATIENT
Start: 2021-12-16 | End: 2022-12-21

## 2021-12-16 ASSESSMENT — ANXIETY QUESTIONNAIRES
5. BEING SO RESTLESS THAT IT IS HARD TO SIT STILL: NOT AT ALL
6. BECOMING EASILY ANNOYED OR IRRITABLE: NOT AT ALL
2. NOT BEING ABLE TO STOP OR CONTROL WORRYING: NOT AT ALL
GAD7 TOTAL SCORE: 0
IF YOU CHECKED OFF ANY PROBLEMS ON THIS QUESTIONNAIRE, HOW DIFFICULT HAVE THESE PROBLEMS MADE IT FOR YOU TO DO YOUR WORK, TAKE CARE OF THINGS AT HOME, OR GET ALONG WITH OTHER PEOPLE: NOT DIFFICULT AT ALL
1. FEELING NERVOUS, ANXIOUS, OR ON EDGE: NOT AT ALL
7. FEELING AFRAID AS IF SOMETHING AWFUL MIGHT HAPPEN: NOT AT ALL
3. WORRYING TOO MUCH ABOUT DIFFERENT THINGS: NOT AT ALL

## 2021-12-16 ASSESSMENT — PAIN SCALES - GENERAL: PAINLEVEL: NO PAIN (0)

## 2021-12-16 ASSESSMENT — PATIENT HEALTH QUESTIONNAIRE - PHQ9
5. POOR APPETITE OR OVEREATING: NOT AT ALL
SUM OF ALL RESPONSES TO PHQ QUESTIONS 1-9: 0

## 2021-12-16 ASSESSMENT — ACTIVITIES OF DAILY LIVING (ADL): CURRENT_FUNCTION: NO ASSISTANCE NEEDED

## 2021-12-16 ASSESSMENT — MIFFLIN-ST. JEOR: SCORE: 1734.74

## 2021-12-16 NOTE — NURSING NOTE
"Chief Complaint   Patient presents with     Physical     Imm/Inj       Initial /64   Pulse (!) 46   Temp 97.6  F (36.4  C) (Tympanic)   Resp 19   Ht 1.676 m (5' 6\")   Wt 100.7 kg (222 lb)   SpO2 98%   BMI 35.83 kg/m   Estimated body mass index is 35.83 kg/m  as calculated from the following:    Height as of this encounter: 1.676 m (5' 6\").    Weight as of this encounter: 100.7 kg (222 lb).  Medication Reconciliation: complete  Shell Rinaldi LPN    "

## 2021-12-17 ASSESSMENT — ANXIETY QUESTIONNAIRES: GAD7 TOTAL SCORE: 0

## 2021-12-20 ENCOUNTER — HOSPITAL ENCOUNTER (OUTPATIENT)
Dept: CARDIOLOGY | Facility: HOSPITAL | Age: 66
Discharge: HOME | End: 2021-12-20
Attending: FAMILY MEDICINE | Admitting: INTERNAL MEDICINE
Payer: MEDICARE

## 2021-12-20 DIAGNOSIS — I49.1 SUPRAVENTRICULAR BEAT, PREMATURE: ICD-10-CM

## 2021-12-20 DIAGNOSIS — I49.3 VENTRICULAR PREMATURE BEATS: ICD-10-CM

## 2021-12-20 PROCEDURE — 93246 EXT ECG>7D<15D RECORDING: CPT

## 2021-12-20 PROCEDURE — 93248 EXT ECG>7D<15D REV&INTERPJ: CPT | Performed by: INTERNAL MEDICINE

## 2021-12-21 ENCOUNTER — TELEPHONE (OUTPATIENT)
Dept: FAMILY MEDICINE | Facility: OTHER | Age: 66
End: 2021-12-21
Payer: COMMERCIAL

## 2022-01-03 NOTE — PROGRESS NOTES
Assessment & Plan     Encounter for medical examination to establish care  Records here and through care everywhere    Tubular adenoma of colon  due  - Adult General Surg Referral    Pure hypercholesterolemia  refilled  - simvastatin (ZOCOR) 80 MG tablet; Take 1 tablet (80 mg) by mouth At Bedtime    Benign essential hypertension  refilled  - lisinopril (ZESTRIL) 40 MG tablet; Take 1 tablet (40 mg) by mouth daily    Morbid obesity (H)  Computer generated diagnosis -- not discussed    Iron deficiency  Minimal -- hgb slight low, likely second to having thalassemia  Recommend stopping after one month    (I47.1) Paroxysmal supraventricular tachycardia (H)  Comment:   Plan: waiting on zio patch but likely not concerning    (Z71.89) ACP (advance care planning)  Comment:   Plan: papers given for he and his wife      25 minutes spent on the date of the encounter doing chart review, history and exam, documentation and further activities per the note       Patient was agreeable to this plan and had no further questions.  There are no Patient Instructions on file for this visit.    No follow-ups on file.    Aleisha Gomez MD  [facility] - ANDREW Lilly is a 66 year old who presents for the following health issues   HPI ESTABLISH CARE    Hypertension Follow-up      Do you check your blood pressure regularly outside of the clinic? No     Are you following a low salt diet? Yes    Are your blood pressures ever more than 140 on the top number (systolic) OR more   than 90 on the bottom number (diastolic), for example 140/90? No      Hyperlipidemia Follow-Up      Are you regularly taking any medication or supplement to lower your cholesterol?   Yes- Simvastatin 80 mg    Are you having muscle aches or other side effects that you think could be caused by your cholesterol lowering medication?  No        Review of Systems   Constitutional, HEENT, cardiovascular, pulmonary, gi and gu systems are  negative, except as otherwise noted.      Objective    /75   Pulse 75   Temp 98  F (36.7  C) (Tympanic)   Wt 102.1 kg (225 lb)   SpO2 96%   BMI 36.32 kg/m    There is no height or weight on file to calculate BMI.  Physical Exam   GENERAL: healthy, alert and no distress  NECK: no adenopathy, no asymmetry, masses, or scars and thyroid normal to palpation  RESP: lungs clear to auscultation - no rales, rhonchi or wheezes  CV: regular rate and rhythm, normal S1 S2, no S3 or S4, no murmur, click or rub, no peripheral edema and peripheral pulses strong  ABDOMEN: soft, nontender, no hepatosplenomegaly, no masses and bowel sounds normal  MS: no gross musculoskeletal defects noted, no edema  PSYCH: mentation appears normal, affect normal/bright    No results found for any visits on 01/19/22.

## 2022-01-19 ENCOUNTER — OFFICE VISIT (OUTPATIENT)
Dept: FAMILY MEDICINE | Facility: OTHER | Age: 67
End: 2022-01-19
Attending: FAMILY MEDICINE
Payer: MEDICARE

## 2022-01-19 VITALS
TEMPERATURE: 98 F | HEART RATE: 75 BPM | BODY MASS INDEX: 36.32 KG/M2 | SYSTOLIC BLOOD PRESSURE: 130 MMHG | WEIGHT: 225 LBS | OXYGEN SATURATION: 96 % | DIASTOLIC BLOOD PRESSURE: 75 MMHG

## 2022-01-19 DIAGNOSIS — Z71.89 ACP (ADVANCE CARE PLANNING): ICD-10-CM

## 2022-01-19 DIAGNOSIS — I10 BENIGN ESSENTIAL HYPERTENSION: ICD-10-CM

## 2022-01-19 DIAGNOSIS — E66.01 MORBID OBESITY (H): ICD-10-CM

## 2022-01-19 DIAGNOSIS — E61.1 IRON DEFICIENCY: ICD-10-CM

## 2022-01-19 DIAGNOSIS — Z00.00 ENCOUNTER FOR MEDICAL EXAMINATION TO ESTABLISH CARE: Primary | ICD-10-CM

## 2022-01-19 DIAGNOSIS — I47.10 PAROXYSMAL SUPRAVENTRICULAR TACHYCARDIA (H): ICD-10-CM

## 2022-01-19 DIAGNOSIS — D12.6 TUBULAR ADENOMA OF COLON: ICD-10-CM

## 2022-01-19 DIAGNOSIS — E78.00 PURE HYPERCHOLESTEROLEMIA: ICD-10-CM

## 2022-01-19 PROCEDURE — 99214 OFFICE O/P EST MOD 30 MIN: CPT | Performed by: FAMILY MEDICINE

## 2022-01-19 PROCEDURE — G0463 HOSPITAL OUTPT CLINIC VISIT: HCPCS

## 2022-01-19 RX ORDER — LISINOPRIL 40 MG/1
40 TABLET ORAL DAILY
Qty: 90 TABLET | Refills: 2 | Status: SHIPPED | OUTPATIENT
Start: 2022-01-19 | End: 2022-11-01

## 2022-01-19 RX ORDER — SIMVASTATIN 80 MG
80 TABLET ORAL AT BEDTIME
Qty: 90 TABLET | Refills: 2 | Status: SHIPPED | OUTPATIENT
Start: 2022-01-19 | End: 2022-11-01

## 2022-01-19 SDOH — HEALTH STABILITY: PHYSICAL HEALTH: ON AVERAGE, HOW MANY DAYS PER WEEK DO YOU ENGAGE IN MODERATE TO STRENUOUS EXERCISE (LIKE A BRISK WALK)?: 3 DAYS

## 2022-01-19 SDOH — HEALTH STABILITY: PHYSICAL HEALTH: ON AVERAGE, HOW MANY MINUTES DO YOU ENGAGE IN EXERCISE AT THIS LEVEL?: 30 MIN

## 2022-01-19 ASSESSMENT — LIFESTYLE VARIABLES
HOW OFTEN DO YOU HAVE SIX OR MORE DRINKS ON ONE OCCASION: NEVER
HOW MANY STANDARD DRINKS CONTAINING ALCOHOL DO YOU HAVE ON A TYPICAL DAY: 1 OR 2
HOW OFTEN DO YOU HAVE A DRINK CONTAINING ALCOHOL: 4 OR MORE TIMES A WEEK

## 2022-01-19 ASSESSMENT — PAIN SCALES - GENERAL: PAINLEVEL: NO PAIN (0)

## 2022-01-19 NOTE — NURSING NOTE
"Chief Complaint   Patient presents with     Lipids     Hypertension       Initial /75   Pulse 75   Temp 98  F (36.7  C) (Tympanic)   Wt 102.1 kg (225 lb)   SpO2 96%   BMI 36.32 kg/m   Estimated body mass index is 36.32 kg/m  as calculated from the following:    Height as of 12/16/21: 1.676 m (5' 6\").    Weight as of this encounter: 102.1 kg (225 lb).  Medication Reconciliation: complete  Cata Norris LPN  "

## 2022-01-25 ENCOUNTER — DOCUMENTATION ONLY (OUTPATIENT)
Dept: OTHER | Facility: CLINIC | Age: 67
End: 2022-01-25

## 2022-01-27 ENCOUNTER — TELEPHONE (OUTPATIENT)
Dept: SURGERY | Facility: OTHER | Age: 67
End: 2022-01-27
Payer: COMMERCIAL

## 2022-01-27 RX ORDER — BISACODYL 5 MG
TABLET, DELAYED RELEASE (ENTERIC COATED) ORAL
Qty: 4 TABLET | Refills: 0 | Status: CANCELLED | OUTPATIENT
Start: 2022-01-27

## 2022-01-27 NOTE — TELEPHONE ENCOUNTER
Referral received for colonoscopy for screening.   There were 3 tubular adenomas on last colonoscopy 10/4/18 with recommendation to return in 3 years per Dr. Parsons.  This patient was not approved by Es Santana, surgery education RN for meet and greet colonoscopy without preop appointment.   Attempted to call patient, but he was not home. Left message for patient to call [phone] or [phone]. My chart message also sent to patient.     Will need Golytely due to some kidney issues.

## 2022-01-27 NOTE — TELEPHONE ENCOUNTER
Patient returned call and requested call back at [phone].     See note below. Will need Golytely and preop. Generic instructions without dates already sent via [employer].

## 2022-01-28 ENCOUNTER — TELEPHONE (OUTPATIENT)
Dept: FAMILY MEDICINE | Facility: OTHER | Age: 67
End: 2022-01-28
Payer: COMMERCIAL

## 2022-01-28 DIAGNOSIS — I47.10 SVT (SUPRAVENTRICULAR TACHYCARDIA) (H): Primary | ICD-10-CM

## 2022-01-28 NOTE — TELEPHONE ENCOUNTER
Returned call to patient. He states that he did some recent cardiac testing and is expected results in a few days. He would like these results before he selects a date for colonoscopy. He would like to schedule with Dr. Parsons. He will call back after he has the results and let us know which date he would like to schedule. He is aware that he will need preop and COVID test prior.     Will also need Golytely

## 2022-02-01 NOTE — TELEPHONE ENCOUNTER
Result note on zio patch results indicate that patient needs to see cardiology. Message sent to patient via [employer] in encounter already started on 1/27/2022 instructing him to contact our office after seen by cardiology and cleared to schedule colonoscopy.

## 2022-02-07 ENCOUNTER — TELEPHONE (OUTPATIENT)
Dept: SURGERY | Facility: OTHER | Age: 67
End: 2022-02-07

## 2022-02-07 ENCOUNTER — OFFICE VISIT (OUTPATIENT)
Dept: CARDIOLOGY | Facility: OTHER | Age: 67
End: 2022-02-07
Attending: FAMILY MEDICINE
Payer: MEDICARE

## 2022-02-07 ENCOUNTER — PREP FOR PROCEDURE (OUTPATIENT)
Dept: SURGERY | Facility: OTHER | Age: 67
End: 2022-02-07

## 2022-02-07 VITALS
TEMPERATURE: 97.8 F | WEIGHT: 220 LBS | HEART RATE: 83 BPM | SYSTOLIC BLOOD PRESSURE: 132 MMHG | OXYGEN SATURATION: 95 % | BODY MASS INDEX: 35.36 KG/M2 | HEIGHT: 66 IN | DIASTOLIC BLOOD PRESSURE: 80 MMHG

## 2022-02-07 DIAGNOSIS — I10 BENIGN ESSENTIAL HYPERTENSION: ICD-10-CM

## 2022-02-07 DIAGNOSIS — D12.6 TUBULAR ADENOMA OF COLON: ICD-10-CM

## 2022-02-07 DIAGNOSIS — E66.01 MORBID OBESITY (H): ICD-10-CM

## 2022-02-07 DIAGNOSIS — Z12.11 COLON CANCER SCREENING: ICD-10-CM

## 2022-02-07 DIAGNOSIS — Z12.11 COLON CANCER SCREENING: Primary | ICD-10-CM

## 2022-02-07 DIAGNOSIS — E78.00 PURE HYPERCHOLESTEROLEMIA: ICD-10-CM

## 2022-02-07 DIAGNOSIS — I47.10 SVT (SUPRAVENTRICULAR TACHYCARDIA) (H): Primary | ICD-10-CM

## 2022-02-07 DIAGNOSIS — Z01.818 PREOP TESTING: Primary | ICD-10-CM

## 2022-02-07 DIAGNOSIS — I49.1 PAC (PREMATURE ATRIAL CONTRACTION): ICD-10-CM

## 2022-02-07 PROCEDURE — 99215 OFFICE O/P EST HI 40 MIN: CPT | Performed by: NURSE PRACTITIONER

## 2022-02-07 PROCEDURE — G0463 HOSPITAL OUTPT CLINIC VISIT: HCPCS

## 2022-02-07 ASSESSMENT — PAIN SCALES - GENERAL: PAINLEVEL: NO PAIN (0)

## 2022-02-07 ASSESSMENT — MIFFLIN-ST. JEOR: SCORE: 1725.66

## 2022-02-07 NOTE — TELEPHONE ENCOUNTER
Patient returned call to schedule colonoscopy for screening and history of tubular adenoma.  This patient was not approved by Es Santana surgery education RN for meet and greet colonoscopy without preop appointment at the time of referral; however, since referral was placed, patient has seen his PCP again and cardiology today and was told by cardiology today OK to proceed with colonoscopy. Discussed again with Es and was decided that patient does not need a preop.   Patient scheduled for colonoscopy on 2/24 with Dr. Himanshu Mcclelland at [facility] with Daniela bowel prep.   Instructions given via phone and sent to patient via mail.   COVID-19 test scheduled 2/20.  Preop: not needed. cleared by cardiology today.    Please sign RX for colon prep in this encounter.
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
## 2022-02-07 NOTE — PATIENT INSTRUCTIONS
"Thank you for allowing Felipa Ortiz and our  team to participate in your care. Please call our office at [phone] with scheduling questions or if you need to cancel or change your appointment. With any other questions or concerns you may call [facility] cardiology nurse at [phone].       If you experience chest pain, chest pressure, chest tightness, shortness of breath, fainting, lightheadedness, nausea, vomiting, or other concerning symptoms, please report to the Emergency Department or call 911. These symptoms may be emergent, and best treated in the Emergency Department.    Follow up in         (I47.1) SVT (supraventricular tachycardia) (H)  (primary encounter diagnosis)  (I49.1) PAC (premature atrial contraction)  Had EKG in December 2021 showing frequent PACs. He was asymptomatic at the time. Wore ziopatch showing PACs at 11.5% burden and short runs of SVT lasting up to 40.8 seconds that were asymptomatic. Since results of ziopatch he has felt \"different feeling\" in chest that is not pain lasting about 4-5 seconds. He has no associated dyspnea, dizziness, lightheadedness, fatigue, chest pain during these short periods. Discussed option of medication management but since these are not really symptomatic or bothersome he elects to not trial medications at this time. If he experiences sustained fluttering or chest discomfort with symptoms to ED. If he experiences these short bouts of \"fluttering\" in chest that become bothersome he can call and we can send in for metoprolol.     (I10) Benign essential hypertension  Comment: chronic, controlled on lisinopril  Plan: Continue with current medications    (E78.00) Pure hypercholesterolemia  Comment: chronic, on simvastatin  Plan: Endorses compliance with simvastatin. 12/2021 lipid panel:  Recent Labs   Lab Test 12/16/21  0854 11/09/20  0847   CHOL 215* 169   HDL 56 47   * 65   TRIG 287* 286*   Heart healthy diet encouraged.       (E66.01) Morbid obesity " (H)  Comment: chronic  Plan: Continue with physical activity, heart healthy diet    (D12.6) Tubular adenoma of colon  Comment: due for colonoscopy  Plan: asymptomatic short runs of SVT and PACs are not contraindication to proceed with colonoscopy.       Follow-up with cardiology as needed    Felipa Ortiz CNP       Please call Dr Parsons's office to schedule your procedure at [phone] (Dr Parsons's nurse) or [phone] (Health unit coordinators).

## 2022-02-07 NOTE — LETTER
February 7, 2022        Maxx Canales  [address]  [facility] [address]        Dear Maxx,       We want your Colonoscopy to be as pleasant as possible. Please review the instructions below. If you have questions, you may contact us at any of the following numbers:     [facility] Unit Coordinator: [phone]  Clinic Nurse (Karen): [phone]  Surgery Education Nurse: [phone]      Date of Procedure: 2/24/2022 with Dr. Himanshu Mcclelland  Admit time: Surgery Department will call you the day before your procedure by 5pm with your admit time. If your surgery is on Monday, expect a call on Friday.  If you are not contacted by 5PM, please call admitting at [phone].   After hours or on weekends, call [phone] to postpone.   Call the clinic nurse if you become ill within 2 weeks of your procedure to reschedule.     Preop appointment is not needed as long as colonoscopy is done within 30 days of your cardiology visit 2/7/2022. If you reschedule to a date more than 30 days from cardiology appointment, a preop will be needed within the 30 days prior to procedure.     COVID-19 test is needed 4 days before procedure. This testing is done at the upper level of the [facility] (weekdays and weekends-East Entrance) or at the [facility] (weekday mornings only).  Follow the signage for parking and bring your mobile phone (if you have one) to call the phone number ([phone]) on the sign outside the testing site for check-in. Stay in your vehicle until you are directed to enter. If you do not have a cell phone, please call [phone] with a description of the vehicle you are taking prior to leaving home.   This test has been scheduled for 2/20/2022 at 8:15 AM at the [facility].      7 DAYS BEFORE THE EXAM:     2/17  Fill your prescription(s) at the pharmacy as soon as possible. (call ahead if more than 1 week)  Call the Surgery Education Nurse at [phone] and have a medication  list ready.  No Aspirin or NSAIDS (Ibuprofen, Celebrex, Naproxen, etc) 7 days before surgery.   Stop fiber supplements, vitamins, iron and herbals. Do not eat corn, nuts or seeds.  If you are prescribed a daily 81 mg Aspirin, you may continue this.   If you are prescribed blood thinners or insulin, talk to your primary care provider for instructions on these medications.    2 DAYS BEFORE THE EXAM:     2/22   Low fiber diet. Nothing red or purple.  See table below for list of low fiber foods.   Drink 4-6 large glasses of sports drink.          AT BEDTIME: take 2 Dulcolax tablets.    Do not eat after 11:59 pm.    Low Fiber Diet    You may have Do NOT have   Starches:        White breads (tortillas, biscuits, toast, pancakes, waffles, etc.), white rice, pasta (not whole grain), cooked and peeled potatoes, plain or saltine crackers, cooked farina or cream of rice, puffed rice, corn flakes, Rice Krispies, Special K.   Starches:       Whole grain breads; Breads containing nuts, seeds or fruit, or more than 1 gram fiber per slice, cornbread, corn or whole wheat tortillas, potatoes with skin, brown rice, wild rice, kasha/buckwheat.   Vegetables:       Tender, well cooked and canned vegetables without seeds or peels including carrots, asparagus tips, green beans, wax  beans, spinach; vegetable broth Vegetables:       Any raw or steamed vegetables, vegetables with seeds, corn in any form   Fruits/Juices:        Strained fruit juice, canned fruit without seeds or skin (not pineapple,) applesauce, pear, ripe bananas, melons (not watermelon) Fruit/Juices:        Prunes, prune juice, raisins or other dried fruits, berries and other fruits with seeds, pineapple, fresh or frozen fruits not listed in other column   Milk Products:       Milk, cheese, cottage cheese, yogurt without berries, custard, ice cream without nuts/fruit Milk Products:       Any dairy product with nuts, seeds or berries   Proteins:       Tender, well-cooked  ground beef, lamb, veal, ham, pork, chicken, turkey, fish or organ meats; eggs, creamy peanut butter Proteins:        Tough, fibrous meats with gristle, cooked dried beans, peas or lentils, crunchy peanut butter          Fats and condiments:        Margarine, butter, oils, dumont, sour cream, salad dressing, plain gravy, spices, cooked herbs, sugar, clear jelly, honey, syrup Fats and condiments:        Pickles, olives, relish, horseradish, jam, marmalade, preserves   Snacks, sweets and drinks:       Pretzels, hard candy, plain cakes and cookies without nuts or seeds, gelatin, plain pudding, sherbet, popsicles, coffee, tea, carbonated beverages Snacks, sweets and drinks:       Popcorn, nuts, seeds, granola, coconut, candies or desserts with nuts/seeds and raisins/dried fruits or whole grains.         1 DAY BEFORE THE EXAM:     2/23  No solid foods or milk products after 12:00 am, midnight.   Drink only clear liquids all day-at least 8-10 glasses including 4-6 glasses sports drink.   See table below for a list of clear liquids. No red, purple, or alcohol.         AT 3:00 PM: Take 2 Dulcolax tablets.         AT 6:00 PM: Drink one 8 oz. glass of Golytely every 10-15 minutes until the jug is half empty. Drink each glass quickly. Store the rest in the refrigerator. Stay near a toilet.    Clear Liquid Diet  You may have: Do NOT have:     ? Tea, coffee (no cream)   Milk or milk products such as ice cream, malts or shakes     ? Water, Vitamin Water, Smart Water, Coconut Water, PowerAde, Propel, Soda pop, (Sprite, 7 UP, Ginger Ale, Gatorade (not red or purple)   Red or purple drinks of any kind such as  Cranberry juice or grape juice.     ? Clear nutrition drinks (Resource Breeze, Ensure Active protein drink (peach flavor)   Red or purple Jell-O, Popsicles, Phong-Aid, Sorbet and candy.     ? Jell-O, Popsicles (no milk or fruit pieces) or Italian ice (not red or purple)   Juices with pulp such as orange, grapefruit, pineapple or  tomato juice     ? Honey/Sugar   Cream soups of any kind     ? Fat-free soup broth or bouillon   Alcohol     ? Plain hard candy, such as clear Life Savers (not red or purple)      ? Powdered Lemonade such as Crystal Light, Country Time      ? Clear juices and fruit-flavored drinks such as apple juice, white grape juice, Hi-C and Phong-Aid (not red or purple)            TIPS FOR COLON CLEANSING BEFORE YOUR COLONOSCOPY  To get accurate results from your exam, your colon must be completely empty or you may need to repeat the colon prep and exam. If you followed instructions and your stool is clear or yellow liquid, you are ready. If you are not sure if your colon is clean, please call the clinic nurse.    You may use Tucks wipes, hemorrhoid treatments, hydrocortisone cream or alcohol-free baby wipes to ease anal irritation. You may also use Vaseline to help protect the skin.     You can squeeze some lemon juice or add a packet of Crystal Light lemon-lime or ice tea flavor into your preparation. You may try sucking on hard candy or a lemon or lime wedge; or washing your mouth out with water, clear soda or mouthwash.    To chill the solution, put it in your refrigerator or set it in a bowl of ice. Do not add ice in your glass. Remove from the refrigerator 15 minutes before drinking.    Quickly drink each glass. It may help to use a timer. If the liquid is too salty, use a straw. Even when you are sitting on the toilet, keep drinking every 10-15 minutes. If you have nausea or vomiting, take a break for 15 to 30 minutes and then resume drinking.    You will have loose watery stools and may also have chills. Dress for comfort. Expect to feel bloating, nausea and other discomfort until the stool clears from your colon.       DAY OF COLONOSCOPY:     2/24           6 HOURS PRIOR TO THE EXAM (set an alarm):  Drink the other half of the Golytely jug-one 8 oz glass every 10-15 minutes until gone.   You may have clear liquids up  until 2 hours before your exam and then nothing by mouth.   If you must take medicine, you may take it with a sip of water.   Do not take diabetes medicine by mouth until after surgery.   If you have an inhaler, bring it to surgery.  Shower before arrival and wear clean,comfortable clothes.   No jewelry, make-up, nail polish, hair spray, lotions, or perfumes.   [place] in Admitting through the [place] Entrance.  You must have a responsible adult available to drive and stay with you for 4 hours at home or you will be cancelled.                         SURGERY HANDBOOK            Your surgery is scheduled:    Date: 2/24/22  ________________________________    Time: hospital surgery will call the day prior to your procedure with arrival time  ________________________________      Surgeon's Name: Himanshu Mcclelland  _______________________        Pre-Op Physical Fax Numbers:          Pre-Admissions  [phone]        Your surgery is located at:  [place] [address]         Before Your Surgery  For Patients and Visitors at [facility]    Welcome  As you get ready for surgery, you may have a lot of questions.  This brochure will help you know what to expect before and after surgery.  You and your family are the most important members of your health care team.  You will need to take an active role in your care.    Be sure to ask questions and learn all that you can about your surgery.  If you have any safety concerns, we urge you to tell a nurse as soon as possible.   This brochure is for information only.  It does not replace the advice of your doctor.  Always follow your doctor's advice.    If you or your  are deaf or hard of hearing, or prefer a language other than English, please let us know.  We have many free services, including interpreters and other aids to help you communicate. You may ask for help  through any member of your care team or by calling  Language Services at [phone], option 2.    GETTING READY FOR SURGERY  Always follow your surgeon's instructions.  If you don't, your surgery could be canceled.  Please use the following checklist.  You have been scheduled for surgery and we would like to give you some information that will assist in helping get the best possible outcome.      Before Surgery:   If for any reason you decide not to have the surgery, please contact your surgeon's office.  We can easily cancel or reschedule the procedure. If it is after hours, please call [phone].    Please keep in mind that the time of surgery is subject to change.  Make sure you have nothing to eat after midnight. If your surgery is later in the afternoon, this recommendation might change, but not until the day before surgery after the actual time of the surgery has been established.        Within 30 Days of Surgery: not needed as long as colonoscopy done within 30 days of your cardiology visit on 2/7/2022.    Have a pre-surgery physical exam with your family doctor or partner.    If you use a [facility], all of your information from the pre-op physical will be in the Epic computer system.    Ask the doctor to send all of your results to the hospital before the surgery.  The doctor also may ask you to bring the results with you on the day of surgery or you can fax them to [phone].    Tell the doctor if:    You are allergic to latex or rubber  (Latex and rubber gloves are often used in medical care).    You are taking any medicines (including aspirin), vitamins (Vitamin E, Fish Oil, Omegas) or herbal products.  You will need to stop taking some medicines before surgery.    You have any medical problems (allergies, diabetes or heart disease, for example).    You have a pacemaker or an AICD (automatic implanted cardiac defibrillator).  If you do, please bring the ID card with you on the day of surgery.    You are a smoker.  People who smoke have  a higher risk of infection after surgery.  Ask your doctor how you can quit smoking.    Within 7 days of Surgery:  Prior to your surgical procedure, a nurse will be contacting you to obtain a health history. A nurse will call you within a few days of surgery to go over these and other instructions.  If you do not hear from them, please call them at [phone].        Call your insurance company.  Ask if you need pre-approval for your surgery.  If you do not have insurance, please let us know.    Arrange for someone to drive you home after surgery.  If you will have same-day surgery, you may not drive or take public transportation home by yourself.    Arrange for someone to stay with you for 24 hours after you go home.  This person must be a responsible adult (ie- Family member or friend).    The Day Before Surgery:     Call your surgeon if there are any changes in your health.  This includes signs of a cold or flu (such as a sore throat, runny nose, cough, rash or fever).    Do not smoke, drink alcohol or take over-the-counter medicine (unless your surgeon tells you to) for 24 hours before and after surgery.    If you take prescribed drugs:  You may need to stop them until after the surgery.  Follow your doctor's orders.  You may resume Aspirin and/or blood thinners after your surgery as directed by your physician/surgeon.    NO SOLID FOOD. CLEAR LIQUIDS ONLY.  Follow your surgeon's orders for eating and drinking.  You need to have an empty stomach before surgery.  This will make the surgery as safe as possible.  If you don't follow your doctor's orders, your surgery could be changed to another date.    The Day of Surgery:    Please remove deodorant, cologne, scented lotion, makeup, nail polish and jewelry (including rings and body piercings).  If you wear artificial nails, please remove at least one nail before coming to the hospital.    Wear clean, loose clothing to the hospital.    Bring these items to the  hospital:  1. Your insurance card.  2. A list of all the medicines you take.  Include vitamins, minerals, herbs and over-the-counter drugs.  Note any drug allergies.  3. A copy of your advance health care directive, if you have one.  This tells us what treatment you would want -- and who would make health care decisions -- if you could no longer speak for yourself.  You may request this form in advance or download it from [email or web address].  4. A case for any glasses, contact lenses, hearing aids or dentures.  5. Your inhaler or CPAP machine, if you use these at home.  Leave extra cash, jewelry and other valuables at home.    When You Arrive:  When you get to the hospital, you will:    Check in.  If you are under age 18, you must be with a parent or legal guardian.    Electronically sign consent forms, if you haven't already.  These electronic forms state that you know the risks and benefits of surgery.  When you sign the forms, you give us permission to do the surgery.  Do not sign them unless you understand what will happen during and after your surgery.  If you have any questions about your surgery, ask to speak with your doctor before you sign the forms.  If you don't understand the answers, ask again.    Receive a copy of the Patients Bill of Rights.  If you do not receive a copy, please ask for one.    Change into hospital clothes.  Your belongings will be placed in a bag.  We will return them to you after surgery.    Meet with the anesthesia provider.  He or she will tell you what kind of anesthesia (medicine) will be used to keep you comfortable during surgery.  Remember: It's okay to remind doctors and nurses to wash their hands before touching you.   In most cases, your surgeon will use a marker to write his or her initials on the surgery site.  This ensures that the exact site is operated on.  For safety reasons, we will ask you the same questions many times.  For example, we may ask your name and  birth date over and over again.  Friends and family can stay with you until it's time for surgery.  While you're in surgery, they will be in the waiting area.  Please note that cell phones are not allowed in some patient care areas.  If you have questions about what will happen in the operating room, talk to your care team.    After Surgery:    We will move you to a recovery room where we will watch you closely.  If you have any pain or discomfort, tell your nurse.  He or she will try to make you comfortable.      If you are staying overnight we will move you to your hospital room after you are awake.    If you are going home we will move you to another room.  Friends and family may be able to join you.  The length of time you spend in recovery depends on the type of medicine you received, your medical condition, and the type of surgery you had.    Going Home:  We will let you know when you're ready to leave the hospital.  Before you leave, we will tell you how to care for yourself at home.  If you do not understand something, please say so.  We will answer any questions you have.  We will then help you get ready to leave.  When you are discharged from the recovery room, the nurses will review instructions with you and your caregiver.  You must have an adult with you for the first 4 hours after you leave the hospital. Take it easy when you get home.  You will need some time to recover -- you may be more tired than you realize at first.  Rest and relax for rest of the day at home.  You'll feel better and heal faster if you take good care of yourself.  Symptoms of infection need to be reported to your surgery office. Please call your Surgeon.      Thank you for following these important instructions.                          Sincerely,        Himanshu Mcclelland MD/Payal CUBA LPN

## 2022-02-07 NOTE — NURSING NOTE
"Chief Complaint   Patient presents with     Cardiac Clearance     Dr Gomez is referring- upcoming procedure with Dr Parsons     Consult     SVT       Initial /80 (BP Location: Right arm, Patient Position: Chair, Cuff Size: Adult Large)   Pulse 83   Temp 97.8  F (36.6  C) (Tympanic)   Ht 1.676 m (5' 6\")   Wt 99.8 kg (220 lb)   SpO2 95%   BMI 35.51 kg/m   Estimated body mass index is 35.51 kg/m  as calculated from the following:    Height as of this encounter: 1.676 m (5' 6\").    Weight as of this encounter: 99.8 kg (220 lb).  Medication Reconciliation: complete  JIGNESH HIGGISN LPN    "

## 2022-02-08 RX ORDER — BISACODYL 5 MG
TABLET, DELAYED RELEASE (ENTERIC COATED) ORAL
Qty: 4 TABLET | Refills: 0 | Status: SHIPPED | OUTPATIENT
Start: 2022-02-08 | End: 2022-12-21
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
## 2022-02-17 ENCOUNTER — ANESTHESIA EVENT (OUTPATIENT)
Dept: SURGERY | Facility: HOSPITAL | Age: 67
End: 2022-02-17
Payer: MEDICARE

## 2022-02-17 ASSESSMENT — LIFESTYLE VARIABLES: TOBACCO_USE: 1

## 2022-02-17 ASSESSMENT — ENCOUNTER SYMPTOMS: DYSRHYTHMIAS: 1
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
## 2022-02-20 ENCOUNTER — OFFICE VISIT (OUTPATIENT)
Dept: FAMILY MEDICINE | Facility: OTHER | Age: 67
End: 2022-02-20
Attending: SURGERY
Payer: MEDICARE

## 2022-02-20 DIAGNOSIS — Z01.818 PREOP TESTING: ICD-10-CM

## 2022-02-20 PROCEDURE — U0005 INFEC AGEN DETEC AMPLI PROBE: HCPCS | Mod: ZL

## 2022-02-21 LAB — SARS-COV-2 RNA RESP QL NAA+PROBE: NEGATIVE

## 2022-02-24 ENCOUNTER — HOSPITAL ENCOUNTER (OUTPATIENT)
Facility: HOSPITAL | Age: 67
Discharge: HOME | End: 2022-02-24
Attending: SURGERY | Admitting: SURGERY
Payer: MEDICARE

## 2022-02-24 ENCOUNTER — ANESTHESIA (OUTPATIENT)
Dept: SURGERY | Facility: HOSPITAL | Age: 67
End: 2022-02-24
Payer: MEDICARE

## 2022-02-24 VITALS
SYSTOLIC BLOOD PRESSURE: 104 MMHG | TEMPERATURE: 98.3 F | RESPIRATION RATE: 16 BRPM | BODY MASS INDEX: 35.36 KG/M2 | OXYGEN SATURATION: 95 % | WEIGHT: 220 LBS | HEART RATE: 76 BPM | HEIGHT: 66 IN | DIASTOLIC BLOOD PRESSURE: 61 MMHG

## 2022-02-24 PROCEDURE — 258N000003 HC RX IP 258 OP 636: Performed by: NURSE ANESTHETIST, CERTIFIED REGISTERED

## 2022-02-24 PROCEDURE — 360N000075 HC SURGERY LEVEL 2, PER MIN: Performed by: SURGERY

## 2022-02-24 PROCEDURE — 250N000011 HC RX IP 250 OP 636: Performed by: NURSE ANESTHETIST, CERTIFIED REGISTERED

## 2022-02-24 PROCEDURE — 370N000017 HC ANESTHESIA TECHNICAL FEE, PER MIN: Performed by: SURGERY

## 2022-02-24 PROCEDURE — 45378 DIAGNOSTIC COLONOSCOPY: CPT | Performed by: NURSE ANESTHETIST, CERTIFIED REGISTERED

## 2022-02-24 PROCEDURE — G0105 COLORECTAL SCRN; HI RISK IND: HCPCS | Performed by: SURGERY

## 2022-02-24 PROCEDURE — 710N000012 HC RECOVERY PHASE 2, PER MINUTE: Performed by: SURGERY

## 2022-02-24 PROCEDURE — 999N000141 HC STATISTIC PRE-PROCEDURE NURSING ASSESSMENT: Performed by: SURGERY

## 2022-02-24 PROCEDURE — 250N000009 HC RX 250: Performed by: NURSE ANESTHETIST, CERTIFIED REGISTERED

## 2022-02-24 RX ORDER — SODIUM CHLORIDE, SODIUM LACTATE, POTASSIUM CHLORIDE, CALCIUM CHLORIDE 600; 310; 30; 20 MG/100ML; MG/100ML; MG/100ML; MG/100ML
INJECTION, SOLUTION INTRAVENOUS CONTINUOUS
Status: DISCONTINUED | OUTPATIENT
Start: 2022-02-24 | End: 2022-02-24 | Stop reason: HOSPADM

## 2022-02-24 RX ORDER — HYDRALAZINE HYDROCHLORIDE 20 MG/ML
2.5-5 INJECTION INTRAMUSCULAR; INTRAVENOUS EVERY 10 MIN PRN
Status: DISCONTINUED | OUTPATIENT
Start: 2022-02-24 | End: 2022-02-24 | Stop reason: HOSPADM

## 2022-02-24 RX ORDER — LIDOCAINE HYDROCHLORIDE 20 MG/ML
INJECTION, SOLUTION INFILTRATION; PERINEURAL PRN
Status: DISCONTINUED | OUTPATIENT
Start: 2022-02-24 | End: 2022-02-24

## 2022-02-24 RX ORDER — ONDANSETRON 2 MG/ML
4 INJECTION INTRAMUSCULAR; INTRAVENOUS EVERY 30 MIN PRN
Status: DISCONTINUED | OUTPATIENT
Start: 2022-02-24 | End: 2022-02-24 | Stop reason: HOSPADM

## 2022-02-24 RX ORDER — ONDANSETRON 4 MG/1
4 TABLET, ORALLY DISINTEGRATING ORAL EVERY 30 MIN PRN
Status: DISCONTINUED | OUTPATIENT
Start: 2022-02-24 | End: 2022-02-24 | Stop reason: HOSPADM

## 2022-02-24 RX ORDER — PROPOFOL 10 MG/ML
INJECTION, EMULSION INTRAVENOUS PRN
Status: DISCONTINUED | OUTPATIENT
Start: 2022-02-24 | End: 2022-02-24

## 2022-02-24 RX ORDER — LIDOCAINE 40 MG/G
CREAM TOPICAL
Status: DISCONTINUED | OUTPATIENT
Start: 2022-02-24 | End: 2022-02-24 | Stop reason: HOSPADM

## 2022-02-24 RX ORDER — LABETALOL 20 MG/4 ML (5 MG/ML) INTRAVENOUS SYRINGE
10
Status: DISCONTINUED | OUTPATIENT
Start: 2022-02-24 | End: 2022-02-24 | Stop reason: HOSPADM

## 2022-02-24 RX ADMIN — LIDOCAINE HYDROCHLORIDE 40 MG: 20 INJECTION, SOLUTION INFILTRATION; PERINEURAL at 13:09

## 2022-02-24 RX ADMIN — PROPOFOL 100 MG: 10 INJECTION, EMULSION INTRAVENOUS at 13:09

## 2022-02-24 RX ADMIN — SODIUM CHLORIDE, POTASSIUM CHLORIDE, SODIUM LACTATE AND CALCIUM CHLORIDE: 600; 310; 30; 20 INJECTION, SOLUTION INTRAVENOUS at 12:51

## 2022-02-24 RX ADMIN — PROPOFOL 50 MG: 10 INJECTION, EMULSION INTRAVENOUS at 13:12

## 2022-02-24 RX ADMIN — PROPOFOL 50 MG: 10 INJECTION, EMULSION INTRAVENOUS at 13:15

## 2022-02-24 NOTE — ANESTHESIA CARE TRANSFER NOTE
Patient: Maxx Canales    Procedure: Procedure(s):  colonoscopy        Diagnosis: Hx of colonic polyps [Z86.010]  Diagnosis Additional Information: No value filed.    Anesthesia Type:   MAC     Note:    Oropharynx: spontaneously breathing and oropharynx clear of all foreign objects  Level of Consciousness: drowsy  Oxygen Supplementation: room air    Independent Airway: airway patency satisfactory and stable  Dentition: dentition unchanged  Vital Signs Stable: post-procedure vital signs reviewed and stable  Report to RN Given: handoff report given  Patient transferred to: Phase II    Handoff Report: Identifed the Patient, Identified the Reponsible Provider, Reviewed the pertinent medical history, Discussed the surgical course, Reviewed Intra-OP anesthesia mangement and issues during anesthesia, Set expectations for post-procedure period and Allowed opportunity for questions and acknowledgement of understanding      Vitals:  Vitals Value Taken Time   BP     Temp     Pulse     Resp     SpO2         Electronically Signed By: SEPIDEH Osman CRNA  February 24, 2022  1:20 PM

## 2022-02-24 NOTE — OP NOTE
REPORT OF OPERATION  DATE OF PROCEDURE: 2/24/2022    PATIENT: Maxx Canales    SURGERY PERFORMED: Colonoscopy    PREOPERATIVE DIAGNOSIS: Screening colonoscopy and History of Colon Polyps    POSTOPERATIVE DIAGNOSIS:    Same   Normal colonoscopy   Diverticulosis was identified.   Hemorrhoids  were  identified.    SURGEON: Himanshu Mcclelland MD    ASSISTANTS: None    ANESTHESIA: Monitored Anesthesia Care    COMPLICATIONS: None apparent    TRANSFUSIONS: None    TISSUE TO PATHOLOGY: None    FINDINGS: Normal colonoscopy.  Diverticulosis was identified.  Hemorrhoids  were  identified.    INDICATIONS: This is a 66 year old male in need of a colonoscopy for Screening colonoscopy and History of Colon Polyps.  The patient will be taken to the endoscopy suite for that procedure.    DESCRIPTIONS OF PROCEDURE IN DETAIL: After consent was obtained the patient was taken to the endoscopy suite and placed in the left lateral decubitus position.  The patient was identified and the correct patient was confirmed.  Monitored Anesthesia Care was given.  A time out was performed verifying the correct patient and the correct procedure.  The entire operative team was in agreement.  All necessary equipment and supplies were in the room.    Rectal exam was performed and no lesions of the anal canal were noted.  The colonoscope was inserted into the anus and passed without difficulty to the cecum.  The cecum was identified by the ileocecal valve, the coalescence of the tinea and the appendiceal orifice.  Upon withdrawal all walls of the colon were visualized.  There were no polyps, masses or evidence of colitis seen.  Diverticulosis was seen.  Upon reaching the rectum the scope was retroflexed and internal hemorrhoids  were  seen.  The scope was straightened back out and removed from the patient.  The patient was then taken to the recovery room in stable condition tolerating the procedure well.      Prep: fair    Withdrawal time was 6  minutes.    It is recommended that the patient have another colonoscopy in 5 years.

## 2022-02-24 NOTE — CARE PLAN
Patient and responsible adult given discharge instructions with no questions regarding instructions. Deepika score 18/18. Pain level 0/10.  Discharged from unit via ambulation, tolerating PO diet. Patient discharged to home w/wife.

## 2022-02-24 NOTE — ANESTHESIA POSTPROCEDURE EVALUATION
Patient: Maxx Canales    Procedure: Procedure(s):  colonoscopy        Anesthesia Type:  MAC    Note:  Disposition: Outpatient   Postop Pain Control: Uneventful            Sign Out: Well controlled pain   PONV: No   Neuro/Psych: Uneventful            Sign Out: Acceptable/Baseline neuro status   Airway/Respiratory: Uneventful            Sign Out: Acceptable/Baseline resp. status   CV/Hemodynamics: Uneventful            Sign Out: Acceptable CV status; No obvious hypovolemia; No obvious fluid overload   Other NRE: NONE   DID A NON-ROUTINE EVENT OCCUR? No           Last vitals:  Vitals Value Taken Time   /60 02/24/22 1330   Temp 98.3  F (36.8  C) 02/24/22 1323   Pulse 74 02/24/22 1330   Resp 16 02/24/22 1330   SpO2 94 % 02/24/22 1331   Vitals shown include unvalidated device data.    Electronically Signed By: SEPIDEH To CRNA  February 24, 2022  1:32 PM

## 2022-02-24 NOTE — DISCHARGE INSTRUCTIONS

## 2022-04-13 ENCOUNTER — OFFICE VISIT (OUTPATIENT)
Dept: UROLOGY | Facility: OTHER | Age: 67
End: 2022-04-13
Attending: UROLOGY
Payer: MEDICARE

## 2022-04-13 VITALS
TEMPERATURE: 96.8 F | RESPIRATION RATE: 16 BRPM | OXYGEN SATURATION: 95 % | WEIGHT: 229.8 LBS | BODY MASS INDEX: 37.09 KG/M2 | SYSTOLIC BLOOD PRESSURE: 130 MMHG | HEART RATE: 81 BPM | DIASTOLIC BLOOD PRESSURE: 68 MMHG

## 2022-04-13 DIAGNOSIS — Z87.442 HISTORY OF KIDNEY STONES: Primary | ICD-10-CM

## 2022-04-13 DIAGNOSIS — N20.0 NEPHROLITHIASIS, URIC ACID: ICD-10-CM

## 2022-04-13 LAB
ALBUMIN UR-MCNC: NEGATIVE MG/DL
APPEARANCE UR: CLEAR
BILIRUB UR QL STRIP: NEGATIVE
COLOR UR AUTO: YELLOW
GLUCOSE UR STRIP-MCNC: NEGATIVE MG/DL
HGB UR QL STRIP: NEGATIVE
KETONES UR STRIP-MCNC: NEGATIVE MG/DL
LEUKOCYTE ESTERASE UR QL STRIP: NEGATIVE
NITRATE UR QL: NEGATIVE
PH UR STRIP: 7 [PH] (ref 4.7–8)
SP GR UR STRIP: 1.02 (ref 1–1.03)
UROBILINOGEN UR STRIP-MCNC: NORMAL MG/DL

## 2022-04-13 PROCEDURE — 81003 URINALYSIS AUTO W/O SCOPE: CPT | Mod: ZL | Performed by: UROLOGY

## 2022-04-13 PROCEDURE — 99203 OFFICE O/P NEW LOW 30 MIN: CPT | Performed by: UROLOGY

## 2022-04-13 ASSESSMENT — PAIN SCALES - GENERAL: PAINLEVEL: NO PAIN (0)

## 2022-04-13 NOTE — NURSING NOTE
Pt presents to clinic for a one year history of kidney stones    Review of Systems:    Weight loss:    No     Recent fever/chills:  No   Night sweats:   No  Current skin rash:  No   Recent hair loss:  Yes  Heat intolerance:  No   Cold intolerance:  Yes  Chest pain:   No   Palpitations:   No  Shortness of breath:  No   Wheezing:   No  Constipation:    No   Diarrhea:   No   Nausea:   No   Vomiting:   No   Kidney/side pain:  No   Back pain:   No  Frequent headaches:  No   Dizziness:     No  Leg swelling:   No   Calf pain:    No

## 2022-04-13 NOTE — Clinical Note
Please let patient know that his urine pH today was 7 which is perfect Any pH at 6.5 or greater is at goal

## 2022-04-13 NOTE — PROGRESS NOTES
Type of Visit  Established    Chief Complaint  History of uric acid stones    HPI  Mr. Canales is a 66 year old male follows up with history of uric acid kidney stones who follows up on medical management.  Last year the patient underwent a Litholink revealing acidic urine, low magnesium level and modest citrate levels.  Given the Litholink results and the history of uric acid stones the patient started potassium citrate 20 mEq twice daily.  Patient reports he has been continue the medication without issue.  Also recommended he start magnesium supplementation.  He is taking mag 400 mg daily.  He has developed some looser stool and is asking if this dose is required.  No episodes of flank pain in the past year.  No ED visits or renal colic.    Patient has a longstanding history of stones going back 30 years.  He has undergone intervention with ureteroscopy numerous times in the past 10 years.      Review of Systems  I reviewed the ROS with the patient.    Nursing Notes:   Felipa Mcrae LPN  4/13/2022  8:23 AM  Addendum  Pt presents to clinic for a one year history of kidney stones    Review of Systems:    Weight loss:    No     Recent fever/chills:  No   Night sweats:   No  Current skin rash:  No   Recent hair loss:  Yes  Heat intolerance:  No   Cold intolerance:  Yes  Chest pain:   No   Palpitations:   No  Shortness of breath:  No   Wheezing:   No  Constipation:    No   Diarrhea:   No   Nausea:   No   Vomiting:   No   Kidney/side pain:  No   Back pain:   No  Frequent headaches:  No   Dizziness:     No  Leg swelling:   No   Calf pain:    No            Physical Exam  Vitals:    04/13/22 0827   BP: 130/68   BP Location: Right arm   Patient Position: Sitting   Cuff Size: Adult Large   Pulse: 81   Resp: 16   Temp: 96.8  F (36  C)   TempSrc: Tympanic   SpO2: 95%   Weight: 104.2 kg (229 lb 12.8 oz)     Constitutional: NAD, WDWN.  Cardiovascular: Regular rate.  Pulmonary/Chest: Respirations are even and non-labored  bilaterally.  Abdominal: Soft. No distension, tenderness, masses or guarding. No CVA tenderness.  Extremities: ABDIAS x 4, Warm. No clubbing.  No cyanosis.    Skin: Pink, warm and dry.  No rashes noted.  Genitourinary: nonpalpable bladder    Labs  Results for orders placed or performed in visit on 04/13/22   UA reflex to Microscopic     Status: Normal   Result Value Ref Range    Color Urine Yellow Colorless, Straw, Light Yellow, Yellow    Appearance Urine Clear Clear    Glucose Urine Negative Negative mg/dL    Bilirubin Urine Negative Negative    Ketones Urine Negative Negative mg/dL    Specific Gravity Urine 1.021 1.003 - 1.035    Blood Urine Negative Negative    pH Urine 7.0 4.7 - 8.0    Protein Albumin Urine Negative Negative mg/dL    Urobilinogen Urine Normal Normal, 2.0 mg/dL    Nitrite Urine Negative Negative    Leukocyte Esterase Urine Negative Negative    Narrative    Microscopic not indicated      12/16/21 08:54   PSA 0.40     Stone analysis  2/19/2021  Uric acid and calcium oxalate hydrate    Litholink  3/17/2021 & 3/18/2021  Volume (L)  3.04-3.38   SS CaOx  1.85-2.47 (6-10)  Urine Calcium  77-78  (male<250)  Urine Oxalate  33-40  (20-40)  Urine Citrate  407-510 (male>450)  SS CaP  0.06-0.11 (0.5-2)  24 hr Urine pH  5.6-5.8  (5.8-6.2)  SS Uric Acid  0.82-0.92 (0-1)  Urine uric acid  0.74-0.81 (<0.8)    (collected while on treatment:  Increased fluids and lemon juice)    Dietary salt is low  Magnesium level was low.    Radiographic Studies  2/9/2021  CT a/p  IMPRESSION: There is a large distal left ureteral calculus measuring 6  x 8 x 10 mm in diameter position 2 cm above the ureterovesical  junction with left-sided hydroureter and hydronephrosis.     Smaller nonobstructing calculi are present bilaterally.    Assessment  Mr. Canales is a 66 year old male follows up with history of uric acid kidney stones who follows up on medical management.  Urinalysis today is negative for abnormalities    I recommended he  decrease the magnesium supplementation for regular bowel movements.    Also recommended he continue potassium citrate twice daily with meals to raise his pH.  Urinalysis today reveals a pH of 7 which is at goal. (pH =/> 6.5)    Plan  Continue magnesium supplementation -may decrease dose based on BM  Continue KCitrate 20 mEq BID due to history of uric acid kidney stones   -Patient will follow up with PCP for ongoing K monitoring.  Follow up with me in 1 year with a UA

## 2022-04-27 DIAGNOSIS — E88.810 DYSMETABOLIC SYNDROME X: ICD-10-CM

## 2022-04-27 RX ORDER — POTASSIUM CITRATE 10 MEQ/1
TABLET, EXTENDED RELEASE ORAL
Qty: 360 TABLET | Refills: 1 | Status: SHIPPED | OUTPATIENT
Start: 2022-04-27 | End: 2022-11-02

## 2022-04-27 NOTE — TELEPHONE ENCOUNTER
potassium citrate (UROCIT-K) 10 MEQ (1080 MG) CR tablet 360 tablet 1 10/12/2021     lov 01/19/22

## 2022-10-29 ENCOUNTER — HEALTH MAINTENANCE LETTER (OUTPATIENT)
Age: 67
End: 2022-10-29

## 2022-10-30 DIAGNOSIS — I10 BENIGN ESSENTIAL HYPERTENSION: ICD-10-CM

## 2022-10-30 DIAGNOSIS — E78.00 PURE HYPERCHOLESTEROLEMIA: ICD-10-CM

## 2022-10-30 DIAGNOSIS — E88.810 DYSMETABOLIC SYNDROME X: ICD-10-CM

## 2022-11-01 RX ORDER — SIMVASTATIN 80 MG
TABLET ORAL
Qty: 90 TABLET | Refills: 0 | Status: SHIPPED | OUTPATIENT
Start: 2022-11-01 | End: 2023-02-07

## 2022-11-01 RX ORDER — LISINOPRIL 40 MG/1
TABLET ORAL
Qty: 90 TABLET | Refills: 0 | Status: SHIPPED | OUTPATIENT
Start: 2022-11-01 | End: 2023-02-07

## 2022-11-01 NOTE — TELEPHONE ENCOUNTER
Potassium      Last Written Prescription Date:  7.24.22  Last Fill Quantity: #360,   # refills: 0  Last Office Visit: 1.19.22  Future Office visit:    Next 5 appointments (look out 90 days)    Dec 21, 2022  8:45 AM  (Arrive by 8:30 AM)  SHORT with Aleisha Gomez MD  [facility] ([facility] [place] ) [address]  [place] [address]  [phone]           Routing refill request to provider for review/approval because:

## 2022-11-02 RX ORDER — POTASSIUM CITRATE 10 MEQ/1
TABLET, EXTENDED RELEASE ORAL
Qty: 360 TABLET | Refills: 0 | Status: SHIPPED | OUTPATIENT
Start: 2022-11-02 | End: 2023-01-25

## 2022-12-21 ENCOUNTER — OFFICE VISIT (OUTPATIENT)
Dept: FAMILY MEDICINE | Facility: OTHER | Age: 67
End: 2022-12-21
Attending: FAMILY MEDICINE
Payer: MEDICARE

## 2022-12-21 VITALS
OXYGEN SATURATION: 98 % | BODY MASS INDEX: 35.89 KG/M2 | TEMPERATURE: 97.8 F | DIASTOLIC BLOOD PRESSURE: 73 MMHG | SYSTOLIC BLOOD PRESSURE: 130 MMHG | HEART RATE: 80 BPM | WEIGHT: 222.38 LBS | RESPIRATION RATE: 18 BRPM

## 2022-12-21 DIAGNOSIS — I47.10 PAROXYSMAL SUPRAVENTRICULAR TACHYCARDIA (H): ICD-10-CM

## 2022-12-21 DIAGNOSIS — E61.1 IRON DEFICIENCY: ICD-10-CM

## 2022-12-21 DIAGNOSIS — I10 ESSENTIAL HYPERTENSION: Primary | ICD-10-CM

## 2022-12-21 DIAGNOSIS — E78.00 PURE HYPERCHOLESTEROLEMIA: ICD-10-CM

## 2022-12-21 DIAGNOSIS — E55.9 HYPOVITAMINOSIS D: ICD-10-CM

## 2022-12-21 DIAGNOSIS — R79.89 ELEVATED FERRITIN: ICD-10-CM

## 2022-12-21 DIAGNOSIS — Z00.00 ENCOUNTER FOR MEDICARE ANNUAL WELLNESS EXAM: ICD-10-CM

## 2022-12-21 LAB
ALBUMIN SERPL BCG-MCNC: 4.6 G/DL (ref 3.5–5.2)
ALP SERPL-CCNC: 80 U/L (ref 40–129)
ALT SERPL W P-5'-P-CCNC: 30 U/L (ref 10–50)
ANION GAP SERPL CALCULATED.3IONS-SCNC: 11 MMOL/L (ref 7–15)
AST SERPL W P-5'-P-CCNC: 28 U/L (ref 10–50)
BILIRUB SERPL-MCNC: 0.8 MG/DL
BUN SERPL-MCNC: 11.6 MG/DL (ref 8–23)
CALCIUM SERPL-MCNC: 9.6 MG/DL (ref 8.8–10.2)
CHLORIDE SERPL-SCNC: 104 MMOL/L (ref 98–107)
CREAT SERPL-MCNC: 0.9 MG/DL (ref 0.67–1.17)
DEPRECATED HCO3 PLAS-SCNC: 27 MMOL/L (ref 22–29)
FERRITIN SERPL-MCNC: 1765 NG/ML (ref 31–409)
GFR SERPL CREATININE-BSD FRML MDRD: >90 ML/MIN/1.73M2
GLUCOSE SERPL-MCNC: 106 MG/DL (ref 70–99)
IRON BINDING CAPACITY (ROCHE): 274 UG/DL (ref 240–430)
IRON SATN MFR SERPL: 33 % (ref 15–46)
IRON SERPL-MCNC: 91 UG/DL (ref 61–157)
POTASSIUM SERPL-SCNC: 4.7 MMOL/L (ref 3.4–5.3)
PROT SERPL-MCNC: 7.7 G/DL (ref 6.4–8.3)
SODIUM SERPL-SCNC: 142 MMOL/L (ref 136–145)

## 2022-12-21 PROCEDURE — G0438 PPPS, INITIAL VISIT: HCPCS | Performed by: FAMILY MEDICINE

## 2022-12-21 PROCEDURE — 84466 ASSAY OF TRANSFERRIN: CPT | Mod: ZL | Performed by: FAMILY MEDICINE

## 2022-12-21 PROCEDURE — 83550 IRON BINDING TEST: CPT | Mod: ZL | Performed by: FAMILY MEDICINE

## 2022-12-21 PROCEDURE — 82306 VITAMIN D 25 HYDROXY: CPT | Mod: ZL | Performed by: FAMILY MEDICINE

## 2022-12-21 PROCEDURE — 36415 COLL VENOUS BLD VENIPUNCTURE: CPT | Mod: ZL | Performed by: FAMILY MEDICINE

## 2022-12-21 PROCEDURE — 82728 ASSAY OF FERRITIN: CPT | Mod: ZL | Performed by: FAMILY MEDICINE

## 2022-12-21 PROCEDURE — 80061 LIPID PANEL: CPT | Mod: ZL | Performed by: FAMILY MEDICINE

## 2022-12-21 PROCEDURE — 80053 COMPREHEN METABOLIC PANEL: CPT | Mod: ZL | Performed by: FAMILY MEDICINE

## 2022-12-21 SDOH — HEALTH STABILITY: PHYSICAL HEALTH: ON AVERAGE, HOW MANY DAYS PER WEEK DO YOU ENGAGE IN MODERATE TO STRENUOUS EXERCISE (LIKE A BRISK WALK)?: 0 DAYS

## 2022-12-21 SDOH — HEALTH STABILITY: PHYSICAL HEALTH: ON AVERAGE, HOW MANY MINUTES DO YOU ENGAGE IN EXERCISE AT THIS LEVEL?: 0 MIN

## 2022-12-21 ASSESSMENT — LIFESTYLE VARIABLES
HOW OFTEN DO YOU HAVE SIX OR MORE DRINKS ON ONE OCCASION: NEVER
SKIP TO QUESTIONS 9-10: 1
HOW MANY STANDARD DRINKS CONTAINING ALCOHOL DO YOU HAVE ON A TYPICAL DAY: 1 OR 2
HOW OFTEN DO YOU HAVE A DRINK CONTAINING ALCOHOL: 4 OR MORE TIMES A WEEK
AUDIT-C TOTAL SCORE: 4

## 2022-12-21 ASSESSMENT — PAIN SCALES - GENERAL: PAINLEVEL: NO PAIN (0)

## 2022-12-21 NOTE — PROGRESS NOTES
"    SUBJECTIVE:   Maxx Canales is a 67 year old male who presents for Preventive Visit.    Patient has been advised of split billing requirements and indicates understanding: Yes  Are you in the first 12 months of your Medicare Part B coverage?  No    Physical Health:    In general, how would you rate your overall physical health? good    Outside of work, how many days during the week do you exercise? 4-5 days/week    Outside of work, approximately how many minutes a day do you exercise?30-45 minutes    If you drink alcohol do you typically have >3 drinks per day or >7 drinks per week? No    Do you usually eat at least 4 servings of fruit and vegetables a day, include whole grains & fiber and avoid regularly eating high fat or \"junk\" foods? Yes    Do you have any problems taking medications regularly?  No    Do you have any side effects from medications? not applicable    Needs assistance for the following daily activities: no assistance needed    Which of the following safety concerns are present in your home?  none identified     Hearing impairment: No    In the past 6 months, have you been bothered by leaking of urine? no    Mental Health:    In general, how would you rate your overall mental or emotional health? excellent  PHQ-2 Score:      Do you feel safe in your environment? Yes    Have you ever done Advance Care Planning? (For example, a Health Directive, POLST, or a discussion with a medical provider or your loved ones about your wishes): Yes, advance care planning is on file.    Additional concerns to address?  No    Fall risk:       Cognitive Screenin) Repeat 3 items (Leader, Season, Table)    2) Clock draw: NORMAL  3) 3 item recall: Recalls 2 objects   Results: NORMAL clock, 1-2 items recalled: COGNITIVE IMPAIRMENT LESS LIKELY    Mini-CogTM Copyright [employer]. Licensed by the author for use in [facility]; reprinted with permission (candice@.[facility]). All rights reserved.      Do you " have sleep apnea, excessive snoring or daytime drowsiness?: yes        Hyperlipidemia Follow-Up      Are you regularly taking any medication or supplement to lower your cholesterol?   Yes- Zocor 80 mg    Are you having muscle aches or other side effects that you think could be caused by your cholesterol lowering medication?  No    Hypertension Follow-up      Do you check your blood pressure regularly outside of the clinic? No     Are you following a low salt diet? Yes    Are your blood pressures ever more than 140 on the top number (systolic) OR more   than 90 on the bottom number (diastolic), for example 140/90? No        Reviewed and updated as needed this visit by clinical staff   Tobacco  Allergies  Meds  Problems  Med Hx  Surg Hx  Fam Hx  Soc   Hx        Reviewed and updated as needed this visit by Provider   Tobacco    Problems  Med Hx  Surg Hx  Fam Hx  Soc Hx       Social History     Tobacco Use     Smoking status: Former     Types: Cigarettes     Quit date: 1999     Years since quittin.9     Smokeless tobacco: Former     Types: Chew     Quit date:      Tobacco comments:     occasional cigar  4x/yr   Substance Use Topics     Alcohol use: Yes     Comment: 5x week                           Current providers sharing in care for this patient include:   Patient Care Team:  Aleisha Gomez MD as PCP - General (Family Medicine)  Gerson Reeves MD as Assigned Surgical Provider  Aleisha Gomez MD as Assigned PCP    The following health maintenance items are reviewed in Epic and correct as of today:  Health Maintenance   Topic Date Due     LIPID  2022     MEDICARE ANNUAL WELLNESS VISIT  2022     FALL RISK ASSESSMENT  2023     ADVANCE CARE PLANNING  2027     COLORECTAL CANCER SCREENING  2027     DTAP/TDAP/TD IMMUNIZATION (3 - Td or Tdap) 2027     PHQ-2 (once per calendar year)  Completed     INFLUENZA VACCINE  Completed     Pneumococcal Vaccine: 65+  Years  Completed     ZOSTER IMMUNIZATION  Completed     AORTIC ANEURYSM SCREENING (SYSTEM ASSIGNED)  Completed     COVID-19 Vaccine  Completed     HEPATITIS C SCREENING  Addressed     IPV IMMUNIZATION  Aged Out     MENINGITIS IMMUNIZATION  Aged Out     Lab work is in process  Labs reviewed in EPIC  BP Readings from Last 3 Encounters:   12/21/22 130/73   04/13/22 130/68   02/24/22 104/61    Wt Readings from Last 3 Encounters:   12/21/22 100.9 kg (222 lb 6 oz)   04/13/22 104.2 kg (229 lb 12.8 oz)   02/24/22 99.8 kg (220 lb)                  Patient Active Problem List   Diagnosis     Diverticulitis of colon     Class 1 obesity with serious comorbidity and body mass index (BMI) of 33.0 to 33.9 in adult, unspecified obesity type     Dysmetabolic syndrome X     Erectile dysfunction, unspecified erectile dysfunction type     Essential hypertension     Partially resolved traumatic cataract of right eye     Primary localized osteoarthrosis, lower leg     Pure hypercholesterolemia     Thalassemia     Elevated red blood cell count     Acute pyelonephritis     Sepsis without acute organ dysfunction, due to unspecified organism (H)     Hydronephrosis of left kidney     Renal lithiasis     History of kidney stones     Morbid obesity (H)     Tubular adenoma of colon     Benign essential hypertension     Iron deficiency     Paroxysmal supraventricular tachycardia (H)     Hypovitaminosis D     Elevated ferritin     Past Surgical History:   Procedure Laterality Date     ABDOMEN SURGERY  1997    sigmoid resection, diverticulitis     COLONOSCOPY  10/21/2013    Procedure: COLONOSCOPY;  COLONOSCOPY with biopsy/ polypectomy;  Surgeon: Carisa Brito DO;  Location: [facility]     COLONOSCOPY N/A 10/04/2018    Procedure: COLONOSCOPY;  COLONOSCOPY WITH POLYPECTOMY;  Surgeon: Mj Parsons MD;  Location: [facility] OR     COLONOSCOPY N/A 02/24/2022 due 2027  Procedure: colonoscopy ;  Surgeon: Himanshu Mcclelland MD;  Location: [facility] OR      COMBINED CYSTOSCOPY, URETEROSCOPY, LASER HOLMIUM LITHOTRIPSY URETER(S) Left 2021    Procedure: lEFT, URETEROSCOPY, LASER HOLMIUM LITHOTRIPSY AND STENT;  Surgeon: Gerson Reeves MD;  Location: [facility] OR     CYSTOSCOPY, INSERT STENT URETHRA, COMBINED Left 2021    Procedure: Left retrograde pyelogram with stent placement;  Surgeon: Gerson Reeves MD;  Location: [facility] OR     EYE SURGERY Right 2018    Dr Shrestha -- cataract     IR RENAL STONE REMOVAL RIGHT  2018    8 cm     TONSILLECTOMY N/A 1960     VASECTOMY         Social History     Tobacco Use     Smoking status: Former     Types: Cigarettes     Quit date: 1999     Years since quittin.9     Smokeless tobacco: Former     Types: Chew     Quit date:      Tobacco comments:     occasional cigar  4x/yr   Substance Use Topics     Alcohol use: Yes     Comment: 5x week     Family History   Problem Relation Age of Onset     Diabetes Mother         healthy in 90s     Hypertension Mother      Osteoarthritis Mother 94        hip and knee replacement at 88     Low Back Problems Mother         spinal stenosis -- on chronic prednisone     Bleeding Disorder Mother         bruising easily     Heart Disease Father 49        5 MIs,  of MI, +tobacco     Other - See Comments Father         rib cage crushed at work     Diabetes Type 2  Brother      Hypertension Brother      Hyperlipidemia Brother      Hypertension Brother      Rheumatoid Arthritis Brother      Throat cancer Brother         +tobacco, worked in [place]     Other - See Comments Maternal Grandmother         CVA     Other - See Comments Maternal Grandfather         old age     Other - See Comments Paternal Grandmother         old age     Other - See Comments Paternal Grandfather         old age         Current Outpatient Medications   Medication Sig Dispense Refill     ASPIRIN PO Take 81 mg by mouth daily       cholecalciferol (VITAMIN D3) 5000 units (125 mcg) capsule Take 125 mcg by mouth daily         "lisinopril (ZESTRIL) 40 MG tablet Take 1 tablet by mouth once daily 90 tablet 0     Magnesium Citrate 200 MG TABS Take 1 tablet by mouth daily       Multiple Vitamins-Minerals (MULTIVITAMIN OR) Take 1 tablet by mouth daily        potassium citrate (UROCIT-K) 10 MEQ (1080 MG) CR tablet Take 2 tablets by mouth twice daily 360 tablet 0     simvastatin (ZOCOR) 80 MG tablet TAKE 1 TABLET BY MOUTH AT BEDTIME 90 tablet 0     Allergies   Allergen Reactions     Atorvastatin      Muscle pain     Pneumonia Vaccine:For adults 65 years or older who do not have an immunocompromising condition, cerebrospinal fluid leak, or cochlear implant and want to receive PCV13 AND PPSV23: Administer 1 dose of PCV13 first then give 1 dose of PPSV23 at least 1 year later. If the patient already received PPSV23, give the dose of PCV13 at least 1 year after they received the most recent dose of PPSV23. Anyone who received any doses of PPSV23 before age 65 should receive 1 final dose of the vaccine at age 65 or older. Administer this last dose at least 5 years after the prior PPSV23 dose.    ROS:  Constitutional, HEENT, cardiovascular, pulmonary, GI, , musculoskeletal, neuro, skin, endocrine and psych systems are negative, except as otherwise noted.    OBJECTIVE:   /73   Pulse 80   Temp 97.8  F (36.6  C) (Tympanic)   Resp 18   Wt 100.9 kg (222 lb 6 oz)   SpO2 98%   BMI 35.89 kg/m   Estimated body mass index is 35.89 kg/m  as calculated from the following:    Height as of 2/24/22: 1.676 m (5' 6\").    Weight as of this encounter: 100.9 kg (222 lb 6 oz).  EXAM:   GENERAL: healthy, alert and no distress  EYES: Eyes grossly normal to inspection, PERRL and conjunctivae and sclerae normal  HENT: ear canals and TM's normal, nose and mouth without ulcers or lesions  NECK: no adenopathy, no asymmetry, masses, or scars and thyroid normal to palpation  RESP: lungs clear to auscultation - no rales, rhonchi or wheezes  CV: regular rate and rhythm, " normal S1 S2, no S3 or S4, no murmur, click or rub, no peripheral edema and peripheral pulses strong  ABDOMEN: soft, nontender, no hepatosplenomegaly, no masses and bowel sounds normal  MS: no gross musculoskeletal defects noted, no edema  SKIN: no suspicious lesions or rashes  NEURO: Normal strength and tone, mentation intact and speech normal  PSYCH: mentation appears normal, affect normal/bright    Diagnostic Test Results:  Labs reviewed in Epic  Results for orders placed or performed in visit on 12/21/22   Comprehensive metabolic panel     Status: Abnormal   Result Value Ref Range    Sodium 142 136 - 145 mmol/L    Potassium 4.7 3.4 - 5.3 mmol/L    Chloride 104 98 - 107 mmol/L    Carbon Dioxide (CO2) 27 22 - 29 mmol/L    Anion Gap 11 7 - 15 mmol/L    Urea Nitrogen 11.6 8.0 - 23.0 mg/dL    Creatinine 0.90 0.67 - 1.17 mg/dL    Calcium 9.6 8.8 - 10.2 mg/dL    Glucose 106 (H) 70 - 99 mg/dL    Alkaline Phosphatase 80 40 - 129 U/L    AST 28 10 - 50 U/L    ALT 30 10 - 50 U/L    Protein Total 7.7 6.4 - 8.3 g/dL    Albumin 4.6 3.5 - 5.2 g/dL    Bilirubin Total 0.8 <=1.2 mg/dL    GFR Estimate >90 >60 mL/min/1.73m2   Iron and iron binding capacity     Status: Normal   Result Value Ref Range    Iron 91 61 - 157 ug/dL    Iron Binding Capacity 274 240 - 430 ug/dL    Iron Sat Index 33 15 - 46 %   Ferritin     Status: Abnormal   Result Value Ref Range    Ferritin 1,765 (H) 31 - 409 ng/mL       ASSESSMENT / PLAN:   (I10) Essential hypertension  (primary encounter diagnosis)  Comment:   Plan: follow-up  1 year  Discussed possibility of sleep apnea second to HTN and ED  He declines sleep referral and wants to work on weight loss  He knows he does snore loudly    (Z00.00) Encounter for Medicare annual wellness exam  Comment:   Plan: stable    (E78.00) Pure hypercholesterolemia  Comment:   Plan: Lipid panel reflex to direct LDL Non-fasting,         Comprehensive metabolic panel        The 10-year ASCVD risk score (Jeff GOTTI, et al.,  "2019) is: 16.1%    Values used to calculate the score:      Age: 67 years      Sex: Male      Is Non- : No      Diabetic: No      Tobacco smoker: No      Systolic Blood Pressure: 130 mmHg      Is BP treated: Yes      HDL Cholesterol: 56 mg/dL      Total Cholesterol: 215 mg/dL    (I47.1) Paroxysmal supraventricular tachycardia (H)  Comment:   Plan: zio patch negative, hasn't had episodes lately    (E61.1) Iron deficiency  Comment:   Plan: Iron and iron binding capacity, Ferritin        Labs pending    (E55.9) Hypovitaminosis D  Comment:   Plan: Vitamin D Deficiency          (R79.89) Elevated ferritin  Comment:   Plan: Transferrin, CBC with platelets          COUNSELING:  Reviewed preventive health counseling, as reflected in patient instructions  Special attention given to:       Regular exercise       Healthy diet/nutrition       Vision screening       Hearing screening       Hepatitis C screening       Prostate cancer screening    Estimated body mass index is 35.89 kg/m  as calculated from the following:    Height as of 2/24/22: 1.676 m (5' 6\").    Weight as of this encounter: 100.9 kg (222 lb 6 oz).    Weight management plan: Discussed healthy diet and exercise guidelines    He reports that he quit smoking about 23 years ago. His smoking use included cigarettes. He quit smokeless tobacco use about 12 years ago.  His smokeless tobacco use included chew.    Appropriate preventive services were discussed with this patient, including applicable screening as appropriate for cardiovascular disease, diabetes, osteopenia/osteoporosis, and glaucoma.  As appropriate for age/gender, discussed screening for colorectal cancer, prostate cancer, breast cancer, and cervical cancer. Checklist reviewing preventive services available has been given to the patient.    Reviewed patients plan of care and provided an AVS. The Intermediate Care Plan ( asthma action plan, low back pain action plan, and migraine " action plan) for Maxx meets the Care Plan requirement. This Care Plan has been established and reviewed with the Patient.    Counseling Resources:  ATP IV Guidelines  Pooled Cohorts Equation Calculator  Breast Cancer Risk Calculator  BRCA-Related Cancer Risk Assessment: FHS-7 Tool  FRAX Risk Assessment  ICSI Preventive Guidelines  Dietary Guidelines for Americans, 2010  USDA's MyPlate  ASA Prophylaxis  Lung CA Screening    Aleisha Gomez MD  [facility]

## 2022-12-21 NOTE — PATIENT INSTRUCTIONS
Patient Education   Personalized Prevention Plan  You are due for the preventive services outlined below.  Your care team is available to assist you in scheduling these services.  If you have already completed any of these items, please share that information with your care team to update in your medical record.  Health Maintenance Due   Topic Date Due     Cholesterol Lab  12/16/2022     Annual Wellness Visit  12/16/2022

## 2022-12-22 LAB
CHOLEST SERPL-MCNC: 183 MG/DL
DEPRECATED CALCIDIOL+CALCIFEROL SERPL-MC: 25 UG/L (ref 20–75)
HDLC SERPL-MCNC: 51 MG/DL
LDLC SERPL CALC-MCNC: 100 MG/DL
NONHDLC SERPL-MCNC: 132 MG/DL
TRANSFERRIN SERPL-MCNC: 248 MG/DL (ref 200–360)
TRIGL SERPL-MCNC: 161 MG/DL

## 2023-01-25 DIAGNOSIS — E88.810 DYSMETABOLIC SYNDROME X: ICD-10-CM

## 2023-01-25 RX ORDER — POTASSIUM CITRATE 10 MEQ/1
TABLET, EXTENDED RELEASE ORAL
Qty: 360 TABLET | Refills: 0 | Status: SHIPPED | OUTPATIENT
Start: 2023-01-25 | End: 2023-05-02

## 2023-01-25 NOTE — TELEPHONE ENCOUNTER
Urocit       Last Written Prescription Date:  11/2/22  Last Fill Quantity: 360,   # refills: 0  Last Office Visit: 12/21/22  Future Office visit:

## 2023-02-05 DIAGNOSIS — I10 BENIGN ESSENTIAL HYPERTENSION: ICD-10-CM

## 2023-02-05 DIAGNOSIS — E78.00 PURE HYPERCHOLESTEROLEMIA: ICD-10-CM

## 2023-02-07 RX ORDER — LISINOPRIL 40 MG/1
TABLET ORAL
Qty: 90 TABLET | Refills: 2 | Status: SHIPPED | OUTPATIENT
Start: 2023-02-07 | End: 2023-11-28

## 2023-02-07 RX ORDER — SIMVASTATIN 80 MG
TABLET ORAL
Qty: 90 TABLET | Refills: 2 | Status: SHIPPED | OUTPATIENT
Start: 2023-02-07 | End: 2023-11-28

## 2023-02-23 DIAGNOSIS — N20.0 NEPHROLITHIASIS, URIC ACID: Primary | ICD-10-CM

## 2023-02-23 NOTE — PROGRESS NOTES
"Per last office visit  4/13/22 with Gerson Reeves MD \"Plan  Continue magnesium supplementation -may decrease dose based on BM  Continue KCitrate 20 mEq BID due to history of uric acid kidney stones              -Patient will follow up with PCP for ongoing K monitoring.  Follow up with me in 1 year with a UA\"        Orders Placed    "

## 2023-03-13 ENCOUNTER — TELEPHONE (OUTPATIENT)
Dept: OTOLARYNGOLOGY | Facility: OTHER | Age: 68
End: 2023-03-13

## 2023-03-13 DIAGNOSIS — Z87.442 HISTORY OF KIDNEY STONES: Primary | ICD-10-CM

## 2023-10-22 ENCOUNTER — HOSPITAL ENCOUNTER (EMERGENCY)
Facility: HOSPITAL | Age: 68
Discharge: HOME | End: 2023-10-22
Attending: STUDENT IN AN ORGANIZED HEALTH CARE EDUCATION/TRAINING PROGRAM | Admitting: STUDENT IN AN ORGANIZED HEALTH CARE EDUCATION/TRAINING PROGRAM
Payer: MEDICARE

## 2023-10-22 ENCOUNTER — APPOINTMENT (OUTPATIENT)
Dept: CT IMAGING | Facility: HOSPITAL | Age: 68
End: 2023-10-22
Attending: STUDENT IN AN ORGANIZED HEALTH CARE EDUCATION/TRAINING PROGRAM
Payer: MEDICARE

## 2023-10-22 VITALS
SYSTOLIC BLOOD PRESSURE: 119 MMHG | DIASTOLIC BLOOD PRESSURE: 79 MMHG | RESPIRATION RATE: 16 BRPM | HEART RATE: 76 BPM | TEMPERATURE: 98 F | OXYGEN SATURATION: 96 %

## 2023-10-22 DIAGNOSIS — N30.01 ACUTE CYSTITIS WITH HEMATURIA: ICD-10-CM

## 2023-10-22 LAB
ALBUMIN SERPL BCG-MCNC: 4.2 G/DL (ref 3.5–5.2)
ALBUMIN UR-MCNC: ABNORMAL G/DL
ALP SERPL-CCNC: 85 U/L (ref 40–129)
ALT SERPL W P-5'-P-CCNC: 21 U/L (ref 0–70)
ANION GAP SERPL CALCULATED.3IONS-SCNC: 12 MMOL/L (ref 7–15)
APPEARANCE UR: ABNORMAL
AST SERPL W P-5'-P-CCNC: 22 U/L (ref 0–45)
BASO+EOS+MONOS # BLD AUTO: ABNORMAL 10*3/UL
BASO+EOS+MONOS NFR BLD AUTO: ABNORMAL %
BASOPHILS # BLD AUTO: 0.1 10E3/UL (ref 0–0.2)
BASOPHILS NFR BLD AUTO: 1 %
BILIRUB SERPL-MCNC: 0.5 MG/DL
BILIRUB UR QL STRIP: ABNORMAL
BUN SERPL-MCNC: 25.7 MG/DL (ref 8–23)
CALCIUM SERPL-MCNC: 9.3 MG/DL (ref 8.8–10.2)
CHLORIDE SERPL-SCNC: 102 MMOL/L (ref 98–107)
COLOR UR AUTO: ABNORMAL
CREAT SERPL-MCNC: 1.04 MG/DL (ref 0.67–1.17)
DEPRECATED HCO3 PLAS-SCNC: 22 MMOL/L (ref 22–29)
EGFRCR SERPLBLD CKD-EPI 2021: 79 ML/MIN/1.73M2
EOSINOPHIL # BLD AUTO: 0.2 10E3/UL (ref 0–0.7)
EOSINOPHIL NFR BLD AUTO: 2 %
ERYTHROCYTE [DISTWIDTH] IN BLOOD BY AUTOMATED COUNT: 15.4 % (ref 10–15)
GLUCOSE SERPL-MCNC: 112 MG/DL (ref 70–99)
GLUCOSE UR STRIP-MCNC: ABNORMAL MG/DL
HCT VFR BLD AUTO: 37.2 % (ref 40–53)
HGB BLD-MCNC: 11.9 G/DL (ref 13.3–17.7)
HGB UR QL STRIP: ABNORMAL
HOLD SPECIMEN: NORMAL
IMM GRANULOCYTES # BLD: 0.1 10E3/UL
IMM GRANULOCYTES NFR BLD: 1 %
KETONES UR STRIP-MCNC: ABNORMAL MG/DL
LEUKOCYTE ESTERASE UR QL STRIP: ABNORMAL
LYMPHOCYTES # BLD AUTO: 2.6 10E3/UL (ref 0.8–5.3)
LYMPHOCYTES NFR BLD AUTO: 25 %
MCH RBC QN AUTO: 20.8 PG (ref 26.5–33)
MCHC RBC AUTO-ENTMCNC: 32 G/DL (ref 31.5–36.5)
MCV RBC AUTO: 65 FL (ref 78–100)
MONOCYTES # BLD AUTO: 1.2 10E3/UL (ref 0–1.3)
MONOCYTES NFR BLD AUTO: 12 %
NEUTROPHILS # BLD AUTO: 6.3 10E3/UL (ref 1.6–8.3)
NEUTROPHILS NFR BLD AUTO: 59 %
NITRATE UR QL: ABNORMAL
NRBC # BLD AUTO: 0 10E3/UL
NRBC BLD AUTO-RTO: 0 /100
PH UR STRIP: ABNORMAL [PH]
PLATELET # BLD AUTO: 249 10E3/UL (ref 150–450)
POTASSIUM SERPL-SCNC: 4.9 MMOL/L (ref 3.4–5.3)
PROT SERPL-MCNC: 7.3 G/DL (ref 6.4–8.3)
RBC # BLD AUTO: 5.73 10E6/UL (ref 4.4–5.9)
RBC URINE: >182 /HPF
SODIUM SERPL-SCNC: 136 MMOL/L (ref 135–145)
SP GR UR STRIP: ABNORMAL
SQUAMOUS EPITHELIAL: 0 /HPF
UROBILINOGEN UR STRIP-MCNC: ABNORMAL MG/DL
WBC # BLD AUTO: 10.5 10E3/UL (ref 4–11)
WBC URINE: >182 /HPF

## 2023-10-22 PROCEDURE — G1010 CDSM STANSON: HCPCS

## 2023-10-22 PROCEDURE — 80053 COMPREHEN METABOLIC PANEL: CPT | Performed by: EMERGENCY MEDICINE

## 2023-10-22 PROCEDURE — 85025 COMPLETE CBC W/AUTO DIFF WBC: CPT | Performed by: EMERGENCY MEDICINE

## 2023-10-22 PROCEDURE — 99284 EMERGENCY DEPT VISIT MOD MDM: CPT | Performed by: STUDENT IN AN ORGANIZED HEALTH CARE EDUCATION/TRAINING PROGRAM

## 2023-10-22 PROCEDURE — 81001 URINALYSIS AUTO W/SCOPE: CPT | Performed by: EMERGENCY MEDICINE

## 2023-10-22 PROCEDURE — 99284 EMERGENCY DEPT VISIT MOD MDM: CPT | Mod: 25

## 2023-10-22 PROCEDURE — 36415 COLL VENOUS BLD VENIPUNCTURE: CPT | Performed by: EMERGENCY MEDICINE

## 2023-10-22 PROCEDURE — 81001 URINALYSIS AUTO W/SCOPE: CPT | Performed by: STUDENT IN AN ORGANIZED HEALTH CARE EDUCATION/TRAINING PROGRAM

## 2023-10-22 RX ORDER — CEPHALEXIN 500 MG/1
500 CAPSULE ORAL 4 TIMES DAILY
Qty: 40 CAPSULE | Refills: 0 | Status: SHIPPED | OUTPATIENT
Start: 2023-10-22 | End: 2023-11-01

## 2023-10-22 ASSESSMENT — ACTIVITIES OF DAILY LIVING (ADL)
ADLS_ACUITY_SCORE: 33
ADLS_ACUITY_SCORE: 35

## 2023-10-22 NOTE — DISCHARGE INSTRUCTIONS
Return to the emergency department for worsening symptoms or new concerning symptoms.  Follow-up with your primary care provider within the next week.  Call schedule appointment.  You can use ibuprofen and Tylenol for pain control and fever control.  Take the antibiotics as directed.  Most we will see improvement in their symptoms within 24 to 48 hours if things seem to be worsening in 48 to 72 hours you should be reevaluated

## 2023-10-22 NOTE — ED TRIAGE NOTES
States that this morning he woke up and when he urinated, he noticed blood in urine. States he has history of kidney stones. Denies flank pain. States he has burning sensation, decreased output, and frequent urination.      Triage Assessment (Adult)       Row Name 10/22/23 0620          Triage Assessment    Airway WDL WDL

## 2023-10-22 NOTE — ED PROVIDER NOTES
History     Chief Complaint   Patient presents with    Hematuria     HPI  Maxx Canales is a 67 year old male with the below past medical history most pertinent for kidney stones sepsis secondary to UTI presents to the emergency department today complaining of blood in his urine.  He does have dysuria.  He thought things were getting better because he was drinking a lot of lemon infused water, but then things got worse.  No fevers.  Martin blood.  No other complaints at this time.  He states he has a history of kidney stones but he does not feel like he has 1.  There is no flank pain    Allergies:  Allergies   Allergen Reactions    Atorvastatin      Muscle pain       Problem List:    Patient Active Problem List    Diagnosis Date Noted    Hypovitaminosis D 12/21/2022     Priority: Medium    Elevated ferritin 12/21/2022     Priority: Medium    Tubular adenoma of colon 01/19/2022     Priority: Medium    Benign essential hypertension 01/19/2022     Priority: Medium    Iron deficiency 01/19/2022     Priority: Medium    Paroxysmal supraventricular tachycardia 01/19/2022     Priority: Medium    History of kidney stones 04/05/2021     Priority: Medium    Morbid obesity (H) 04/05/2021     Priority: Medium    Acute pyelonephritis 02/09/2021     Priority: Medium     Added automatically from request for surgery [id number]      Sepsis without acute organ dysfunction, due to unspecified organism (H) 02/09/2021     Priority: Medium     Added automatically from request for surgery [id number]      Hydronephrosis of left kidney 02/09/2021     Priority: Medium    Renal lithiasis 02/09/2021     Priority: Medium    Elevated red blood cell count 11/09/2020     Priority: Medium    Erectile dysfunction, unspecified erectile dysfunction type 10/02/2018     Priority: Medium    Partially resolved traumatic cataract of right eye 01/18/2018     Priority: Medium    Class 1 obesity with serious comorbidity and body mass index (BMI) of 33.0 to 33.9  in adult, unspecified obesity type 09/28/2017     Priority: Medium    Diverticulitis of colon 10/10/2013     Priority: Medium    Primary localized osteoarthrosis, lower leg 02/19/2013     Priority: Medium    Dysmetabolic syndrome X 10/03/2007     Priority: Medium    Thalassemia 10/03/2007     Priority: Medium    Pure hypercholesterolemia 05/12/2003     Priority: Medium    Essential hypertension 04/25/2003     Priority: Medium     Overview:   IMO Update          Past Medical History:    Past Medical History:   Diagnosis Date    Class 1 obesity with serious comorbidity and body mass index (BMI) of 33.0 to 33.9 in adult, unspecified obesity type 09/28/2017    Concussion without loss of consciousness 1965    Diverticulitis of colon 10/10/2013    Dysmetabolic syndrome X 10/03/2007    Erectile dysfunction, unspecified erectile dysfunction type 10/02/2018    Essential hypertension 04/25/2003    Nephrolithiasis     Partially resolved traumatic cataract of right eye 01/18/2018    Primary localized osteoarthrosis, lower leg 02/19/2013    Pure hypercholesterolemia 05/12/2003    Thalassemia 10/03/2007    Tubular adenoma of colon 2018       Past Surgical History:    Past Surgical History:   Procedure Laterality Date    ABDOMEN SURGERY  1997    sigmoid resection, diverticulitis    COLONOSCOPY  10/21/2013    Procedure: COLONOSCOPY;  COLONOSCOPY with biopsy/ polypectomy;  Surgeon: Carisa Brito DO;  Location: [facility]    COLONOSCOPY N/A 10/04/2018    Procedure: COLONOSCOPY;  COLONOSCOPY WITH POLYPECTOMY;  Surgeon: Mj Parsons MD;  Location: [facility]    COLONOSCOPY N/A 02/24/2022    due 2027  Procedure: colonoscopy ;  Surgeon: Himanshu Mcclelland MD;  Location: [facility]    COMBINED CYSTOSCOPY, URETEROSCOPY, LASER HOLMIUM LITHOTRIPSY URETER(S) Left 02/19/2021    Procedure: lEFT, URETEROSCOPY, LASER HOLMIUM LITHOTRIPSY AND STENT;  Surgeon: Gerson Reeves MD;  Location:  OR    CYSTOSCOPY, INSERT STENT URETHRA, COMBINED Left  2021    Procedure: Left retrograde pyelogram with stent placement;  Surgeon: Gerson Reeves MD;  Location: [facility]    EYE SURGERY Right     Dr Shrestha -- cataract    IR RENAL STONE REMOVAL RIGHT  2018    8 cm    TONSILLECTOMY N/A 1960    VASECTOMY         Family History:    Family History   Problem Relation Age of Onset    Diabetes Mother         healthy in 90s    Hypertension Mother     Osteoarthritis Mother 94        hip and knee replacement at 88    Low Back Problems Mother         spinal stenosis -- on chronic prednisone    Bleeding Disorder Mother         bruising easily    Heart Disease Father 49        5 MIs,  of MI, +tobacco    Other - See Comments Father         rib cage crushed at work    Diabetes Type 2  Brother     Hypertension Brother     Hyperlipidemia Brother     Hypertension Brother     Rheumatoid Arthritis Brother     Throat cancer Brother         +tobacco, worked in [employer]    Other - See Comments Maternal Grandmother         CVA    Other - See Comments Maternal Grandfather         old age    Other - See Comments Paternal Grandmother         old age    Other - See Comments Paternal Grandfather         old age       Social History:  Marital Status:   [2]  Social History     Tobacco Use    Smoking status: Former     Types: Cigarettes     Quit date: 1999     Years since quittin.8    Smokeless tobacco: Former     Types: Chew     Quit date:     Tobacco comments:     occasional cigar  4x/yr   Vaping Use    Vaping Use: Never used   Substance Use Topics    Alcohol use: Yes     Comment: 5x week    Drug use: No     Comment: THC remote        Medications:    ASPIRIN PO  cholecalciferol (VITAMIN D3) 5000 units (125 mcg) capsule  lisinopril (ZESTRIL) 40 MG tablet  Magnesium Citrate 200 MG TABS  Multiple Vitamins-Minerals (MULTIVITAMIN OR)  potassium citrate (UROCIT-K) 10 MEQ (1080 MG) CR tablet  simvastatin (ZOCOR) 80 MG tablet  cephALEXin (KEFLEX) 500 MG capsule          Review  of Systems  See hpi  Physical Exam   BP: 138/87  Pulse: 82  Temp: 97.5  F (36.4  C)  Resp: 16  SpO2: 97 %      Physical Exam  Constitutional: Alert and conversant. NAD   HENT: NCAT   Eyes: Normal pupils   Neck: supple   CV: No pallor  Pulmonary/Chest: Non-labored respirations  Abdominal: Soft, non-tender, non-distended no rebound no guarding no CVA tenderness  MSK: [place].   Neuro: Alert and appropriate   Skin: Warm and dry. No diaphoresis. No rashes on exposed skin    Psych: Appropriate mood and affect     ED Course                 Procedures            Results for orders placed or performed during the hospital encounter of 10/22/23 (from the past 24 hour(s))   Urine Macroscopic with reflex to Microscopic   Result Value Ref Range    Color Urine Red (A) Colorless, Straw, Light Yellow, Yellow    Appearance Urine Turbid (A) Clear    Glucose Urine      Bilirubin Urine      Ketones Urine      Specific Gravity Urine      Blood Urine      pH Urine      Protein Albumin Urine      Urobilinogen Urine      Nitrite Urine      Leukocyte Esterase Urine      RBC Urine >182 (H) <=2 /HPF    WBC Urine >182 (H) <=5 /HPF    Squamous Epithelials Urine 0 <=1 /HPF   CBC with platelets differential    Narrative    The following orders were created for panel order CBC with platelets differential.  Procedure                               Abnormality         Status                     ---------                               -----------         ------                     CBC with platelets and d...[[id number]]  Abnormal            Final result                 Please view results for these tests on the individual orders.   Comprehensive metabolic panel   Result Value Ref Range    Sodium 136 135 - 145 mmol/L    Potassium 4.9 3.4 - 5.3 mmol/L    Carbon Dioxide (CO2) 22 22 - 29 mmol/L    Anion Gap 12 7 - 15 mmol/L    Urea Nitrogen 25.7 (H) 8.0 - 23.0 mg/dL    Creatinine 1.04 0.67 - 1.17 mg/dL    GFR Estimate 79 >60 mL/min/1.73m2    Calcium 9.3 8.8 -  10.2 mg/dL    Chloride 102 98 - 107 mmol/L    Glucose 112 (H) 70 - 99 mg/dL    Alkaline Phosphatase 85 40 - 129 U/L    AST 22 0 - 45 U/L    ALT 21 0 - 70 U/L    Protein Total 7.3 6.4 - 8.3 g/dL    Albumin 4.2 3.5 - 5.2 g/dL    Bilirubin Total 0.5 <=1.2 mg/dL   CBC with platelets and differential   Result Value Ref Range    WBC Count 10.5 4.0 - 11.0 10e3/uL    RBC Count 5.73 4.40 - 5.90 10e6/uL    Hemoglobin 11.9 (L) 13.3 - 17.7 g/dL    Hematocrit 37.2 (L) 40.0 - 53.0 %    MCV 65 (L) 78 - 100 fL    MCH 20.8 (L) 26.5 - 33.0 pg    MCHC 32.0 31.5 - 36.5 g/dL    RDW 15.4 (H) 10.0 - 15.0 %    Platelet Count 249 150 - 450 10e3/uL    % Neutrophils 59 %    % Lymphocytes 25 %    % Monocytes 12 %    Mids % (Monos, Eos, Basos)      % Eosinophils 2 %    % Basophils 1 %    % Immature Granulocytes 1 %    NRBCs per 100 WBC 0 <1 /100    Absolute Neutrophils 6.3 1.6 - 8.3 10e3/uL    Absolute Lymphocytes 2.6 0.8 - 5.3 10e3/uL    Absolute Monocytes 1.2 0.0 - 1.3 10e3/uL    Mids Abs (Monos, Eos, Basos)      Absolute Eosinophils 0.2 0.0 - 0.7 10e3/uL    Absolute Basophils 0.1 0.0 - 0.2 10e3/uL    Absolute Immature Granulocytes 0.1 <=0.4 10e3/uL    Absolute NRBCs 0.0 10e3/uL   Extra Tube    Narrative    The following orders were created for panel order Extra Tube.  Procedure                               Abnormality         Status                     ---------                               -----------         ------                     Extra Blue Top Tube[[id number]]                              Final result               Extra Red Top Tube[[id number]]                               Final result               Extra Heparinized Syringe[[id number]]                        Final result                 Please view results for these tests on the individual orders.   Extra Blue Top Tube   Result Value Ref Range    Hold Specimen JIC    Extra Red Top Tube   Result Value Ref Range    Hold Specimen JIC    Extra Heparinized Syringe   Result Value Ref  Range    Hold Specimen [facility]        Medications - No data to display    Assessments & Plan (with Medical Decision Making)     I have reviewed the nursing notes.    I have reviewed the findings, diagnosis, plan and need for follow up with the patient.      New Prescriptions    CEPHALEXIN (KEFLEX) 500 MG CAPSULE    Take 1 capsule (500 mg) by mouth 4 times daily for 10 days       Final diagnoses:   Acute cystitis with hematuria       10/22/2023   [facility]       Jay Alcantara MD  10/22/23 0919

## 2023-10-24 ENCOUNTER — TELEPHONE (OUTPATIENT)
Dept: FAMILY MEDICINE | Facility: OTHER | Age: 68
End: 2023-10-24

## 2023-10-24 NOTE — TELEPHONE ENCOUNTER
I called and spoke with patient. Patient stated he is doing much better and is no longer experiencing hematuria since staring the antibiotic. Patient requested a appointment for in a week. I have scheduled the patient for 11/1/2023 at 100. Patient had no further questions.    Follow up with your primary care doctor in 2-4 days  Take tylenol and or motrin for pain as needed for the pain with food only  return to Emergency room with any worsening symptoms or no improvements    Advance activity as tolerated.  Continue all previously prescribed medications as directed unless otherwise instructed.  Follow up with your primary care physician in 48-72 hours- bring copies of your results.  Return to the ER for worsening or persistent symptoms, and/or ANY NEW OR CONCERNING SYMPTOMS. If you have issues obtaining follow up, please call: [phone] to obtain a doctor or specialist who takes your insurance in your area.  You may call [phone] to make an appointment with the internal medicine clinic.

## 2023-10-24 NOTE — TELEPHONE ENCOUNTER
9:23 AM    Reason for Call: OVERBOOK    Patient is having the following symptoms:  for hematuria 24 hours.    The patient is requesting an appointment for urgent care follow up with Dr. Gomez.    Was an appointment offered for this call? No  If yes : Appointment type              Date    Preferred method for responding to this message: Telephone Call  What is your phone number ?  [phone]     If we cannot reach you directly, may we leave a detailed response at the number you provided? Yes    Can this message wait until your PCP/provider returns, if unavailable today? YES    SREEKANTH BONE

## 2023-11-01 ENCOUNTER — OFFICE VISIT (OUTPATIENT)
Dept: FAMILY MEDICINE | Facility: OTHER | Age: 68
End: 2023-11-01
Attending: FAMILY MEDICINE
Payer: COMMERCIAL

## 2023-11-01 VITALS
WEIGHT: 205.1 LBS | HEIGHT: 66 IN | TEMPERATURE: 96 F | SYSTOLIC BLOOD PRESSURE: 108 MMHG | HEART RATE: 67 BPM | BODY MASS INDEX: 32.96 KG/M2 | RESPIRATION RATE: 17 BRPM | DIASTOLIC BLOOD PRESSURE: 68 MMHG | OXYGEN SATURATION: 94 %

## 2023-11-01 DIAGNOSIS — N30.01 ACUTE CYSTITIS WITH HEMATURIA: Primary | ICD-10-CM

## 2023-11-01 DIAGNOSIS — N20.0 NEPHROLITHIASIS: ICD-10-CM

## 2023-11-01 LAB
ALBUMIN UR-MCNC: NEGATIVE MG/DL
APPEARANCE UR: CLEAR
BACTERIA #/AREA URNS HPF: ABNORMAL /HPF
BILIRUB UR QL STRIP: NEGATIVE
COLOR UR AUTO: ABNORMAL
GLUCOSE UR STRIP-MCNC: NEGATIVE MG/DL
HGB UR QL STRIP: NEGATIVE
KETONES UR STRIP-MCNC: NEGATIVE MG/DL
LEUKOCYTE ESTERASE UR QL STRIP: NEGATIVE
NITRATE UR QL: NEGATIVE
PH UR STRIP: 6.5 [PH] (ref 4.7–8)
RBC URINE: <1 /HPF
SP GR UR STRIP: 1.01 (ref 1–1.03)
SQUAMOUS EPITHELIAL: 0 /HPF
UROBILINOGEN UR STRIP-MCNC: NORMAL MG/DL
WBC URINE: 1 /HPF

## 2023-11-01 PROCEDURE — G0463 HOSPITAL OUTPT CLINIC VISIT: HCPCS

## 2023-11-01 PROCEDURE — 99214 OFFICE O/P EST MOD 30 MIN: CPT | Performed by: FAMILY MEDICINE

## 2023-11-01 PROCEDURE — 81001 URINALYSIS AUTO W/SCOPE: CPT | Mod: ZL | Performed by: FAMILY MEDICINE

## 2023-11-01 ASSESSMENT — PAIN SCALES - GENERAL: PAINLEVEL: NO PAIN (0)

## 2023-11-01 NOTE — PROGRESS NOTES
"  Assessment & Plan     Acute cystitis with hematuria  Hematuria resolved with one dose of abx?   Suspect a larger stone passed and then resolved hematuria  No urine culture  - UA Macroscopic with reflex to Microscopic and Culture; Future  - UA Macroscopic with reflex to Microscopic and Culture  - Adult Urology  Referral; Future    Nephrolithiasis  Had many small stones non-obstructing on CT scan  - Adult Urology  Referral; Future    Provider  Link to [facility] Help Grid :[id number]}     BMI:   Estimated body mass index is 33.1 kg/m  as calculated from the following:    Height as of this encounter: 1.676 m (5' 6\").    Weight as of this encounter: 93 kg (205 lb 1.6 oz).   Weight management plan: Discussed healthy diet and exercise guidelines    Patient was agreeable to this plan and had no further questions.  There are no Patient Instructions on file for this visit.    No follow-ups on file.    Aleisha Gomez MD  [facility] ANDREW Marcano is a 67 year old, presenting for the following health issues:  Follow Up        11/1/2023     1:08 PM   Additional Questions   Roomed by ANNE-MARIE Bradley   Accompanied by self`       [facility]     ED/UC Followup:    Facility:  [facility] ED   Date of visit: 10/22/23  Reason for visit: Acute cystitis with hematuria   Current Status: feeling better     Review of Systems   Constitutional, HEENT, cardiovascular, pulmonary, gi and gu systems are negative, except as otherwise noted.      Objective    /68 (BP Location: Right arm, Patient Position: Sitting, Cuff Size: Adult Regular)   Pulse 67   Temp (!) 96  F (35.6  C) (Tympanic)   Resp 17   Ht 1.676 m (5' 6\")   Wt 93 kg (205 lb 1.6 oz)   SpO2 94%   BMI 33.10 kg/m    Body mass index is 33.1 kg/m .  Physical Exam   GENERAL: healthy, alert and no distress  RESP: lungs clear to auscultation - no rales, rhonchi or wheezes  CV: regular rate and rhythm, normal S1 S2, no S3 or S4, no murmur, click or rub, no " peripheral edema and peripheral pulses strong  ABDOMEN: soft, nontender, no hepatosplenomegaly, no masses and bowel sounds normal  MS: no gross musculoskeletal defects noted, no edema  PSYCH: mentation appears normal, affect normal/bright    Results for orders placed or performed in visit on 11/01/23   UA Macroscopic with reflex to Microscopic and Culture     Status: Abnormal    Specimen: Urine, Midstream   Result Value Ref Range    Color Urine Light Yellow Colorless, Straw, Light Yellow, Yellow    Appearance Urine Clear Clear    Glucose Urine Negative Negative mg/dL    Bilirubin Urine Negative Negative    Ketones Urine Negative Negative mg/dL    Specific Gravity Urine 1.007 1.003 - 1.035    Blood Urine Negative Negative    pH Urine 6.5 4.7 - 8.0    Protein Albumin Urine Negative Negative mg/dL    Urobilinogen Urine Normal Normal, 2.0 mg/dL    Nitrite Urine Negative Negative    Leukocyte Esterase Urine Negative Negative    Bacteria Urine Few (A) None Seen /HPF    RBC Urine <1 <=2 /HPF    WBC Urine 1 <=5 /HPF    Squamous Epithelials Urine 0 <=1 /HPF    Narrative    Microscopic not indicated  Urine Culture not indicated

## 2023-11-26 DIAGNOSIS — E78.00 PURE HYPERCHOLESTEROLEMIA: ICD-10-CM

## 2023-11-26 DIAGNOSIS — I10 BENIGN ESSENTIAL HYPERTENSION: ICD-10-CM

## 2023-11-28 RX ORDER — LISINOPRIL 40 MG/1
TABLET ORAL
Qty: 90 TABLET | Refills: 0 | Status: SHIPPED | OUTPATIENT
Start: 2023-11-28 | End: 2024-01-05

## 2023-11-28 RX ORDER — SIMVASTATIN 80 MG
TABLET ORAL
Qty: 90 TABLET | Refills: 0 | Status: SHIPPED | OUTPATIENT
Start: 2023-11-28 | End: 2024-01-05

## 2024-01-02 ASSESSMENT — ANXIETY QUESTIONNAIRES
7. FEELING AFRAID AS IF SOMETHING AWFUL MIGHT HAPPEN: NOT AT ALL
5. BEING SO RESTLESS THAT IT IS HARD TO SIT STILL: NOT AT ALL
4. TROUBLE RELAXING: NOT AT ALL
1. FEELING NERVOUS, ANXIOUS, OR ON EDGE: NOT AT ALL
GAD7 TOTAL SCORE: 0
2. NOT BEING ABLE TO STOP OR CONTROL WORRYING: NOT AT ALL
3. WORRYING TOO MUCH ABOUT DIFFERENT THINGS: NOT AT ALL
6. BECOMING EASILY ANNOYED OR IRRITABLE: NOT AT ALL

## 2024-01-02 ASSESSMENT — PATIENT HEALTH QUESTIONNAIRE - PHQ9: SUM OF ALL RESPONSES TO PHQ QUESTIONS 1-9: 0

## 2024-01-05 ENCOUNTER — OFFICE VISIT (OUTPATIENT)
Dept: FAMILY MEDICINE | Facility: OTHER | Age: 69
End: 2024-01-05
Attending: FAMILY MEDICINE
Payer: COMMERCIAL

## 2024-01-05 ENCOUNTER — LAB (OUTPATIENT)
Dept: LAB | Facility: OTHER | Age: 69
End: 2024-01-05
Attending: FAMILY MEDICINE
Payer: MEDICARE

## 2024-01-05 VITALS
BODY MASS INDEX: 34.09 KG/M2 | HEIGHT: 66 IN | HEART RATE: 74 BPM | WEIGHT: 212.13 LBS | SYSTOLIC BLOOD PRESSURE: 120 MMHG | OXYGEN SATURATION: 97 % | DIASTOLIC BLOOD PRESSURE: 75 MMHG | TEMPERATURE: 98 F

## 2024-01-05 DIAGNOSIS — Z00.00 ENCOUNTER FOR MEDICARE ANNUAL WELLNESS EXAM: Primary | ICD-10-CM

## 2024-01-05 DIAGNOSIS — E88.810 DYSMETABOLIC SYNDROME X: ICD-10-CM

## 2024-01-05 DIAGNOSIS — Z12.5 SCREENING FOR PROSTATE CANCER: ICD-10-CM

## 2024-01-05 DIAGNOSIS — I10 BENIGN ESSENTIAL HYPERTENSION: ICD-10-CM

## 2024-01-05 DIAGNOSIS — E78.00 PURE HYPERCHOLESTEROLEMIA: ICD-10-CM

## 2024-01-05 DIAGNOSIS — N20.0 NEPHROLITHIASIS: ICD-10-CM

## 2024-01-05 DIAGNOSIS — I47.10 SVT (SUPRAVENTRICULAR TACHYCARDIA) (H): ICD-10-CM

## 2024-01-05 DIAGNOSIS — E55.9 HYPOVITAMINOSIS D: ICD-10-CM

## 2024-01-05 DIAGNOSIS — R73.09 ELEVATED GLUCOSE: ICD-10-CM

## 2024-01-05 DIAGNOSIS — E66.01 MORBID OBESITY (H): ICD-10-CM

## 2024-01-05 DIAGNOSIS — D64.9 ANEMIA, UNSPECIFIED TYPE: ICD-10-CM

## 2024-01-05 DIAGNOSIS — R79.89 ELEVATED FERRITIN: ICD-10-CM

## 2024-01-05 LAB
ALBUMIN SERPL BCG-MCNC: 4.6 G/DL (ref 3.5–5.2)
ALP SERPL-CCNC: 72 U/L (ref 40–150)
ALT SERPL W P-5'-P-CCNC: 29 U/L (ref 0–70)
ANION GAP SERPL CALCULATED.3IONS-SCNC: 10 MMOL/L (ref 7–15)
AST SERPL W P-5'-P-CCNC: 30 U/L (ref 0–45)
BILIRUB SERPL-MCNC: 1 MG/DL
BUN SERPL-MCNC: 15 MG/DL (ref 8–23)
CALCIUM SERPL-MCNC: 9.3 MG/DL (ref 8.8–10.2)
CHLORIDE SERPL-SCNC: 104 MMOL/L (ref 98–107)
CHOLEST SERPL-MCNC: 185 MG/DL
CREAT SERPL-MCNC: 0.95 MG/DL (ref 0.67–1.17)
DEPRECATED HCO3 PLAS-SCNC: 26 MMOL/L (ref 22–29)
EGFRCR SERPLBLD CKD-EPI 2021: 88 ML/MIN/1.73M2
ERYTHROCYTE [DISTWIDTH] IN BLOOD BY AUTOMATED COUNT: 19.1 % (ref 10–15)
FERRITIN SERPL-MCNC: 1285 NG/ML (ref 31–409)
GLUCOSE SERPL-MCNC: 114 MG/DL (ref 70–99)
HCT VFR BLD AUTO: 40.9 % (ref 40–53)
HDLC SERPL-MCNC: 49 MG/DL
HGB BLD-MCNC: 12.7 G/DL (ref 13.3–17.7)
HOLD SPECIMEN: NORMAL
LDLC SERPL CALC-MCNC: 100 MG/DL
MCH RBC QN AUTO: 20.8 PG (ref 26.5–33)
MCHC RBC AUTO-ENTMCNC: 31.1 G/DL (ref 31.5–36.5)
MCV RBC AUTO: 67 FL (ref 78–100)
NONHDLC SERPL-MCNC: 136 MG/DL
PLATELET # BLD AUTO: 173 10E3/UL (ref 150–450)
POTASSIUM SERPL-SCNC: 4.6 MMOL/L (ref 3.4–5.3)
PROT SERPL-MCNC: 7.2 G/DL (ref 6.4–8.3)
PSA SERPL DL<=0.01 NG/ML-MCNC: 0.55 NG/ML (ref 0–4.5)
RBC # BLD AUTO: 6.11 10E6/UL (ref 4.4–5.9)
SODIUM SERPL-SCNC: 140 MMOL/L (ref 135–145)
TRIGL SERPL-MCNC: 182 MG/DL
WBC # BLD AUTO: 8.4 10E3/UL (ref 4–11)

## 2024-01-05 PROCEDURE — 82306 VITAMIN D 25 HYDROXY: CPT | Mod: ZL | Performed by: FAMILY MEDICINE

## 2024-01-05 PROCEDURE — 82728 ASSAY OF FERRITIN: CPT | Mod: ZL | Performed by: FAMILY MEDICINE

## 2024-01-05 PROCEDURE — 82607 VITAMIN B-12: CPT | Mod: ZL | Performed by: FAMILY MEDICINE

## 2024-01-05 PROCEDURE — G0463 HOSPITAL OUTPT CLINIC VISIT: HCPCS

## 2024-01-05 PROCEDURE — 84466 ASSAY OF TRANSFERRIN: CPT | Mod: ZL | Performed by: FAMILY MEDICINE

## 2024-01-05 PROCEDURE — 85027 COMPLETE CBC AUTOMATED: CPT | Mod: ZL | Performed by: FAMILY MEDICINE

## 2024-01-05 PROCEDURE — G0439 PPPS, SUBSEQ VISIT: HCPCS | Performed by: FAMILY MEDICINE

## 2024-01-05 PROCEDURE — 36415 COLL VENOUS BLD VENIPUNCTURE: CPT | Mod: ZL

## 2024-01-05 PROCEDURE — 80053 COMPREHEN METABOLIC PANEL: CPT | Mod: ZL | Performed by: FAMILY MEDICINE

## 2024-01-05 PROCEDURE — 80061 LIPID PANEL: CPT | Mod: ZL | Performed by: FAMILY MEDICINE

## 2024-01-05 PROCEDURE — G0103 PSA SCREENING: HCPCS | Mod: ZL | Performed by: FAMILY MEDICINE

## 2024-01-05 PROCEDURE — 99213 OFFICE O/P EST LOW 20 MIN: CPT | Mod: 25 | Performed by: FAMILY MEDICINE

## 2024-01-05 RX ORDER — LISINOPRIL 40 MG/1
40 TABLET ORAL DAILY
Qty: 90 TABLET | Refills: 3 | Status: SHIPPED | OUTPATIENT
Start: 2024-01-05

## 2024-01-05 RX ORDER — SIMVASTATIN 80 MG
80 TABLET ORAL AT BEDTIME
Qty: 90 TABLET | Refills: 3 | Status: SHIPPED | OUTPATIENT
Start: 2024-01-05

## 2024-01-05 RX ORDER — POTASSIUM CITRATE 10 MEQ/1
TABLET, EXTENDED RELEASE ORAL
Qty: 360 TABLET | Refills: 3 | Status: SHIPPED | OUTPATIENT
Start: 2024-01-05

## 2024-01-05 SDOH — HEALTH STABILITY: PHYSICAL HEALTH: ON AVERAGE, HOW MANY MINUTES DO YOU ENGAGE IN EXERCISE AT THIS LEVEL?: 0 MIN

## 2024-01-05 SDOH — HEALTH STABILITY: PHYSICAL HEALTH: ON AVERAGE, HOW MANY DAYS PER WEEK DO YOU ENGAGE IN MODERATE TO STRENUOUS EXERCISE (LIKE A BRISK WALK)?: 0 DAYS

## 2024-01-05 ASSESSMENT — ENCOUNTER SYMPTOMS
CONSTIPATION: 0
DIZZINESS: 0
COUGH: 0
SORE THROAT: 0
SHORTNESS OF BREATH: 0
HEARTBURN: 0
HEMATOCHEZIA: 0
NAUSEA: 0
DYSURIA: 0
FEVER: 0
EYE PAIN: 0
WEAKNESS: 0
JOINT SWELLING: 0
ABDOMINAL PAIN: 0
PARESTHESIAS: 0
PALPITATIONS: 0
CHILLS: 0
NERVOUS/ANXIOUS: 0
HEMATURIA: 0
DIARRHEA: 0
HEADACHES: 0
ARTHRALGIAS: 1
MYALGIAS: 0
FREQUENCY: 0

## 2024-01-05 ASSESSMENT — ANXIETY QUESTIONNAIRES
4. TROUBLE RELAXING: NOT AT ALL
5. BEING SO RESTLESS THAT IT IS HARD TO SIT STILL: NOT AT ALL
2. NOT BEING ABLE TO STOP OR CONTROL WORRYING: NOT AT ALL
GAD7 TOTAL SCORE: 0
6. BECOMING EASILY ANNOYED OR IRRITABLE: NOT AT ALL
7. FEELING AFRAID AS IF SOMETHING AWFUL MIGHT HAPPEN: NOT AT ALL
1. FEELING NERVOUS, ANXIOUS, OR ON EDGE: NOT AT ALL
7. FEELING AFRAID AS IF SOMETHING AWFUL MIGHT HAPPEN: NOT AT ALL
GAD7 TOTAL SCORE: 0
3. WORRYING TOO MUCH ABOUT DIFFERENT THINGS: NOT AT ALL
GAD7 TOTAL SCORE: 0

## 2024-01-05 ASSESSMENT — LIFESTYLE VARIABLES
HOW OFTEN DO YOU HAVE SIX OR MORE DRINKS ON ONE OCCASION: NEVER
SKIP TO QUESTIONS 9-10: 1
HOW MANY STANDARD DRINKS CONTAINING ALCOHOL DO YOU HAVE ON A TYPICAL DAY: 1 OR 2
AUDIT-C TOTAL SCORE: 4
HOW OFTEN DO YOU HAVE A DRINK CONTAINING ALCOHOL: 4 OR MORE TIMES A WEEK

## 2024-01-05 ASSESSMENT — PATIENT HEALTH QUESTIONNAIRE - PHQ9
SUM OF ALL RESPONSES TO PHQ QUESTIONS 1-9: 0
SUM OF ALL RESPONSES TO PHQ QUESTIONS 1-9: 0

## 2024-01-05 ASSESSMENT — ACTIVITIES OF DAILY LIVING (ADL): CURRENT_FUNCTION: NO ASSISTANCE NEEDED

## 2024-01-05 ASSESSMENT — PAIN SCALES - GENERAL: PAINLEVEL: NO PAIN (0)

## 2024-01-05 NOTE — COMMUNITY RESOURCES LIST (ENGLISH)
01/05/2024   [facility] - Outpatient Clinics  N/A  For additional resource needs, please contact your health insurance member services or your primary care team.  Phone: [phone]   Email: N/A   Address: [address] [place] [id number]   Hours: N/A        Exercise and Recreation       Gym or workout facility  1  [employer] Distance: 16.31 miles      In-Person   [address] [place] [place] [address]  Language: English  Hours: Mon - Sun Open 24 Hours  Fees: Insurance, Self Pay, Sliding Fee   Phone: (566) 314-5539 Email: virginia@[employer] Website: https://www.Widespace/gyms/40/wfcadpec-mp-17630/          Important Numbers & Websites       [address]  Poison Control   (990) 310-1017 [place]  Suicide and Crisis Lifeline   [address] Center[facility]  Childhelp [place] Child Abuse Hotline   509.434.2307 Childhelphotline.org  National Sexual Assault Hotline   (869) 974-3755 (HOPE) Rainn.org  National Runaway Safeline   (752) 524-9808 (RUNAWAY) [facility]  Pregnancy & Postpartum Support [place]   Call/text 996-280-0401 Ppsupport[place].org  Substance Abuse National Helpline ([facility]   831-640-HELP (5136) Findtreatment.gov  Emergency Services   911

## 2024-01-05 NOTE — PROGRESS NOTES
"The patient was counseled and encouraged to consider modifying their diet and eating habits. He was provided with information on recommended healthy diet options.  Answers submitted by the patient for this visit:  Patient Health Questionnaire (Submitted on 1/5/2024)  PHQ9 TOTAL SCORE: 0  ERNESTINE-7 (Submitted on 1/5/2024)  ERNESTINE 7 TOTAL SCORE: 0  Annual Preventive Visit (Submitted on 1/5/2024)  Chief Complaint: Annual Exam:  In general, how would you rate your overall physical health?: good  Frequency of exercise:: 2-3 days/week  Do you usually eat at least 4 servings of fruit and vegetables a day, include whole grains & fiber, and avoid regularly eating high fat or \"junk\" foods? : No  Taking medications regularly:: Yes  Medication side effects:: None  Activities of Daily Living: no assistance needed  Home safety: throw rugs in the hallway, lack of grab bars in the bathroom  Hearing Impairment:: no hearing concerns  In the past 6 months, have you been bothered by leaking of urine?: No  abdominal pain: No  Blood in stool: No  Blood in urine: No  chest pain: No  chills: No  congestion: No  constipation: No  cough: No  diarrhea: No  dizziness: No  ear pain: No  eye pain: No  nervous/anxious: No  fever: No  frequency: No  genital sores: No  headaches: No  hearing loss: No  heartburn: No  arthralgias: Yes  joint swelling: No  peripheral edema: No  mood changes: No  myalgias: No  nausea: No  dysuria: No  palpitations: No  Skin sensation changes: No  sore throat: No  urgency: No  rash: No  shortness of breath: No  visual disturbance: No  weakness: No  impotence: Yes  penile discharge: No  In general, how would you rate your overall mental or emotional health?: excellent  Additional concerns today:: No  Exercise outside of work (Submitted on 1/5/2024)  Chief Complaint: Annual Exam:  Duration of exercise:: 30-45 minutes    "

## 2024-01-05 NOTE — PROGRESS NOTES
"SUBJECTIVE:   Jeet is a 68 year old, presenting for the following:  Recheck Medication, Medicare Visit, and Hypertension        1/5/2024     8:20 AM   Additional Questions   Roomed by Deedee Randhawa   Accompanied by None         1/5/2024     8:20 AM   Patient Reported Additional Medications   Patient reports taking the following new medications None       Are you in the first 12 months of your Medicare coverage?  No    Healthy Habits:     In general, how would you rate your overall health?  Good    Frequency of exercise:  2-3 days/week    Duration of exercise:  30-45 minutes    Do you usually eat at least 4 servings of fruit and vegetables a day, include whole grains    & fiber and avoid regularly eating high fat or \"junk\" foods?  No    Taking medications regularly:  Yes    Medication side effects:  None    Ability to successfully perform activities of daily living:  No assistance needed    Home Safety:  Throw rugs in the hallway and lack of grab bars in the bathroom    Hearing Impairment:  No hearing concerns    In the past 6 months, have you been bothered by leaking of urine?  No    In general, how would you rate your overall mental or emotional health?  Excellent    Additional concerns today:  No      patient would like PSA drawn     Hypertension Follow-up    Do you check your blood pressure regularly outside of the clinic? No   Are you following a low salt diet? Yes  Are your blood pressures ever more than 140 on the top number (systolic) OR more   than 90 on the bottom number (diastolic), for example 140/90? Unknown patient is not checking blood pressure outside of the clinic       How many servings of fruits and vegetables do you eat daily?  2-3  On average, how many sweetened beverages do you drink each day (Examples: soda, juice, sweet tea, etc.  Do NOT count diet or artificially sweetened beverages)?   0  How many days per week do you exercise enough to make your heart beat faster? 3 or less  How many " minutes a day do you exercise enough to make your heart beat faster? 30 - 60  How many days per week do you miss taking your medication? 0       Have you ever done Advance Care Planning? (For example, a Health Directive, POLST, or a discussion with a medical provider or your loved ones about your wishes): Yes, advance care planning is on file.      Fall risk  Fallen 2 or more times in the past year?: No  Any fall with injury in the past year?: No  Cognitive Screening   1) Repeat 3 items (Leader, Season, Table)    2) Clock draw: NORMAL  3) 3 item recall: Recalls 1 object   Results: NORMAL clock, 1-2 items recalled: COGNITIVE IMPAIRMENT LESS LIKELY    Mini-CogTM Copyright S [employer]. Licensed by the author for use in [facility]; reprinted with permission ([email or web address]). All rights reserved.      Do you have sleep apnea, excessive snoring or daytime drowsiness? : no    Reviewed and updated as needed this visit by clinical staff   Tobacco  Allergies  Meds              Reviewed and updated as needed this visit by Provider                 Social History     Tobacco Use    Smoking status: Former     Types: Cigarettes     Quit date: 1999     Years since quittin.0     Passive exposure: Never    Smokeless tobacco: Former     Types: Chew     Quit date: 2010    Tobacco comments:     occasional cigar  4x/yr   Substance Use Topics    Alcohol use: Yes     Comment: 5x week             2024     8:28 AM   Alcohol Use   Prescreen: >3 drinks/day or >7 drinks/week? No   Do you have a current opioid prescription? No  Do you use any other controlled substances or medications that are not prescribed by a provider? None    Hyperlipidemia Follow-Up    Are you regularly taking any medication or supplement to lower your cholesterol?   No  Are you having muscle aches or other side effects that you think could be caused by your cholesterol lowering medication?  No    Current providers sharing in care for this  patient include:   Patient Care Team:  Aleisha Gomez MD as PCP - General (Family Medicine)  Aleisha Gomez MD as Assigned PCP    The following health maintenance items are reviewed in Epic and correct as of today:  Health Maintenance   Topic Date Due    RSV VACCINE (Pregnancy & 60+) (1 - 1-dose 60+ series) Never done    LIPID  12/21/2023    MEDICARE ANNUAL WELLNESS VISIT  12/21/2023    FALL RISK ASSESSMENT  01/05/2025    COLORECTAL CANCER SCREENING  02/24/2027    DTAP/TDAP/TD IMMUNIZATION (3 - Td or Tdap) 12/13/2027    ADVANCE CARE PLANNING  12/21/2027    PHQ-2 (once per calendar year)  Completed    INFLUENZA VACCINE  Completed    Pneumococcal Vaccine: 65+ Years  Completed    ZOSTER IMMUNIZATION  Completed    AORTIC ANEURYSM SCREENING (SYSTEM ASSIGNED)  Completed    COVID-19 Vaccine  Completed    HEPATITIS C SCREENING  Addressed    IPV IMMUNIZATION  Aged Out    HPV IMMUNIZATION  Aged Out    MENINGITIS IMMUNIZATION  Aged Out    RSV MONOCLONAL ANTIBODY  Aged Out     Lab work is in process  Labs reviewed in EPIC  BP Readings from Last 3 Encounters:   01/05/24 120/75   11/01/23 108/68   10/22/23 119/79    Wt Readings from Last 3 Encounters:   01/05/24 96.2 kg (212 lb 2 oz)   11/01/23 93 kg (205 lb 1.6 oz)   12/21/22 100.9 kg (222 lb 6 oz)                  Patient Active Problem List   Diagnosis    Diverticulitis of colon    Class 1 obesity with serious comorbidity and body mass index (BMI) of 33.0 to 33.9 in adult, unspecified obesity type    Dysmetabolic syndrome X    Erectile dysfunction, unspecified erectile dysfunction type    Essential hypertension    Partially resolved traumatic cataract of right eye    Primary localized osteoarthrosis, lower leg    Pure hypercholesterolemia    Thalassemia    Elevated red blood cell count    Acute pyelonephritis    Sepsis without acute organ dysfunction, due to unspecified organism (H)    Hydronephrosis of left kidney    Renal lithiasis    History of kidney stones     Morbid obesity (H)    Tubular adenoma of colon    Benign essential hypertension    Iron deficiency    SVT (supraventricular tachycardia)    Hypovitaminosis D    Elevated ferritin    Nephrolithiasis    Acute cystitis with hematuria     Past Surgical History:   Procedure Laterality Date    ABDOMEN SURGERY      sigmoid resection, diverticulitis    COLONOSCOPY  10/21/2013    Procedure: COLONOSCOPY;  COLONOSCOPY with biopsy/ polypectomy;  Surgeon: Carisa Brito DO;  Location: [facility]    COLONOSCOPY N/A 10/04/2018    Procedure: COLONOSCOPY;  COLONOSCOPY WITH POLYPECTOMY;  Surgeon: Mj Parsons MD;  Location: [facility]    COLONOSCOPY N/A 2022    due   Procedure: colonoscopy ;  Surgeon: Himanshu Mcclelland MD;  Location: [facility]    COMBINED CYSTOSCOPY, URETEROSCOPY, LASER HOLMIUM LITHOTRIPSY URETER(S) Left 2021    Procedure: lEFT, URETEROSCOPY, LASER HOLMIUM LITHOTRIPSY AND STENT;  Surgeon: Gerson Reeves MD;  Location:  OR    CYSTOSCOPY, INSERT STENT URETHRA, COMBINED Left 2021    Procedure: Left retrograde pyelogram with stent placement;  Surgeon: Gerson Reeves MD;  Location:  OR    EYE SURGERY Right 2018    Dr Shrestha -- cataract    IR RENAL STONE REMOVAL RIGHT  2018    8 cm    TONSILLECTOMY N/A 1960    VASECTOMY         Social History     Tobacco Use    Smoking status: Former     Packs/day: 1.00     Years: 30.00     Additional pack years: 0.00     Total pack years: 30.00     Types: Cigarettes     Quit date: 1999     Years since quittin.0     Passive exposure: Never    Smokeless tobacco: Former     Types: Chew     Quit date: 2010    Tobacco comments:     occasional cigar  4x/yr   Substance Use Topics    Alcohol use: Yes     Comment: 1-2/day     Family History   Problem Relation Age of Onset    Diabetes Mother         healthy in 90s    Hypertension Mother 96        in NH now as of     Osteoarthritis Mother 94        hip and knee replacement at 88    Low Back Problems  Mother         spinal stenosis -- on chronic prednisone    Bleeding Disorder Mother         bruising easily    Heart Disease Father 49        5 MIs,  of MI, +tobacco    Other - See Comments Father         rib cage crushed at work    Diabetes Type 2  Brother     Hypertension Brother     Hyperlipidemia Brother     Hypertension Brother     Rheumatoid Arthritis Brother 50    Throat cancer Brother         +tobacco, worked in [employer]    Other - See Comments Maternal Grandmother         CVA    Other - See Comments Maternal Grandfather         old age    Other - See Comments Paternal Grandmother         old age    Other - See Comments Paternal Grandfather         old age         Current Outpatient Medications   Medication Sig Dispense Refill    ASPIRIN PO Take 81 mg by mouth daily      cholecalciferol (VITAMIN D3) 5000 units (125 mcg) capsule Take 125 mcg by mouth daily       lisinopril (ZESTRIL) 40 MG tablet Take 1 tablet by mouth once daily 90 tablet 0    Magnesium Citrate 200 MG TABS Take 1 tablet by mouth daily      Multiple Vitamins-Minerals (MULTIVITAMIN OR) Take 1 tablet by mouth daily       potassium citrate (UROCIT-K) 10 MEQ (1080 MG) CR tablet Take 2 tablets by mouth twice daily 360 tablet 2    simvastatin (ZOCOR) 80 MG tablet TAKE 1 TABLET BY MOUTH AT BEDTIME 90 tablet 0     Allergies   Allergen Reactions    Atorvastatin      Muscle pain     Recent Labs   Lab Test 10/22/23  0707 22  0958 21  0854 21  0854 21  0834 21  0413 21  1310 20  0847 20  2034 19  0914   A1C  --   --   --  5.5  --   --   --  5.9*  --  5.6   LDL  --  100  --  102*  --   --   --  65  --  98   HDL  --  51  --  56  --   --   --  47  --  56   TRIG  --  161*  --  287*  --   --   --  286*  --  262*   ALT 21 30  --  39  --   --    < > 42   < > 29   CR 1.04 0.90   < > 0.92 0.93 0.90   < > 0.85   < > 0.87   GFRESTIMATED 79 >90   < > 87 86 85   < > >90   < > >90   GFRESTBLACK  --   --   --   --  " >90 >90   < > >90   < > >90   POTASSIUM 4.9 4.7  --  4.4 4.4 3.8   < > 4.4   < > 4.0    < > = values in this interval not displayed.      Review of Systems   Constitutional:  Negative for chills and fever.   HENT:  Negative for congestion, ear pain, hearing loss and sore throat.    Eyes:  Negative for pain and visual disturbance.   Respiratory:  Negative for cough and shortness of breath.    Cardiovascular:  Negative for chest pain, palpitations and peripheral edema.   Gastrointestinal:  Negative for abdominal pain, constipation, diarrhea, heartburn, hematochezia and nausea.   Genitourinary:  Positive for impotence. Negative for dysuria, frequency, genital sores, hematuria, penile discharge and urgency.   Musculoskeletal:  Positive for arthralgias. Negative for joint swelling and myalgias.   Skin:  Negative for rash.   Neurological:  Negative for dizziness, weakness, headaches and paresthesias.   Psychiatric/Behavioral:  Negative for mood changes. The patient is not nervous/anxious.      OBJECTIVE:   /75 (BP Location: Left arm, Patient Position: Sitting, Cuff Size: Adult Regular)   Pulse 74   Temp 98  F (36.7  C) (Tympanic)   Ht 1.676 m (5' 6\")   Wt 96.2 kg (212 lb 2 oz)   SpO2 97%   BMI 34.24 kg/m   Estimated body mass index is 34.24 kg/m  as calculated from the following:    Height as of this encounter: 1.676 m (5' 6\").    Weight as of this encounter: 96.2 kg (212 lb 2 oz).  Physical Exam  GENERAL: healthy, alert and no distress  EYES: Eyes grossly normal to inspection, PERRL and conjunctivae and sclerae normal  HENT: ear canals and TM's normal, nose and mouth without ulcers or lesions  NECK: no adenopathy, no asymmetry, masses, or scars and thyroid normal to palpation  RESP: lungs clear to auscultation - no rales, rhonchi or wheezes  CV: regular rate and rhythm, normal S1 S2, no S3 or S4, no murmur, click or rub, no peripheral edema and peripheral pulses strong  ABDOMEN: soft, nontender, no " hepatosplenomegaly, no masses and bowel sounds normal  MS: no gross musculoskeletal defects noted, no edema  SKIN: no suspicious lesions or rashes  NEURO: Normal strength and tone, mentation intact and speech normal  PSYCH: mentation appears normal, affect normal/bright    Diagnostic Test Results:  Labs reviewed in Epic  Results for orders placed or performed in visit on 01/05/24   Extra Tube     Status: None (In process)    Narrative    The following orders were created for panel order Extra Tube.  Procedure                               Abnormality         Status                     ---------                               -----------         ------                     Extra Green Top (Lithium...[[id number]]                      In process                 Extra Green Top (Lithium...[[id number]]                      In process                 Extra Purple Top Tube[[id number]]                            In process                   Please view results for these tests on the individual orders.       ASSESSMENT / PLAN:   (Z00.00) Encounter for Medicare annual wellness exam  (primary encounter diagnosis)  Comment:   Plan: follow-up 1 year    (E78.00) Pure hypercholesterolemia  Comment:   Plan: Comprehensive metabolic panel, Lipid Profile         (Chol, Trig, HDL, LDL calc), simvastatin         (ZOCOR) 80 MG tablet        The 10-year ASCVD risk score (Jeff GOTTI, et al., 2019) is: 14.4%    Values used to calculate the score:      Age: 68 years      Sex: Male      Is Non- : No      Diabetic: No      Tobacco smoker: No      Systolic Blood Pressure: 120 mmHg      Is BP treated: Yes      HDL Cholesterol: 51 mg/dL      Total Cholesterol: 183 mg/dL    (Z12.5) Screening for prostate cancer  Comment:   Plan: PSA, screen        pending    (E66.01) Morbid obesity (H)  Comment:   Plan: computer generated diagnosis  He is starting on the elliptical this winter    (I47.10) SVT (supraventricular  tachycardia)  Comment:   Plan: asymptomatic, no need for meds currently    (N20.0) Nephrolithiasis  Comment:   Plan: Adult Urology  Referral, Magnesium         Citrate 200 MG TABS        Refilled, will get into urology [facility], he does not want to go to [facility]    (I10) Benign essential hypertension  Comment:   Plan: lisinopril (ZESTRIL) 40 MG tablet        refilled    (E88.810) Dysmetabolic syndrome X  Comment:   Plan: potassium citrate (UROCIT-K) 10 MEQ (1080 MG)         CR tablet          (E55.9) Hypovitaminosis D  Comment:   Plan: Vitamin D Deficiency        Labs pending    (R79.89) Elevated ferritin  Comment:   Plan: Ferritin            COUNSELING:  Reviewed preventive health counseling, as reflected in patient instructions  Special attention given to:       Regular exercise       Healthy diet/nutrition       Vision screening       Hearing screening       Dental care       Prostate cancer screening      He reports that he quit smoking about 25 years ago. His smoking use included cigarettes. He has never been exposed to tobacco smoke. He quit smokeless tobacco use about 14 years ago.  His smokeless tobacco use included chew.      Appropriate preventive services were discussed with this patient, including applicable screening as appropriate for fall prevention, nutrition, physical activity, Tobacco-use cessation, weight loss and cognition.  Checklist reviewing preventive services available has been given to the patient.    Reviewed patients plan of care and provided an AVS. The Intermediate Care Plan ( asthma action plan, low back pain action plan, and migraine action plan) for Maxx meets the Care Plan requirement. This Care Plan has been established and reviewed with the Patient.          Aleisha Gomez MD  [facility] [place]    Identified Health Risks:  I have reviewed Opioid Use Disorder and Substance Use Disorder risk factors and made any needed referrals.

## 2024-01-05 NOTE — PATIENT INSTRUCTIONS
Patient Education   Personalized Prevention Plan  You are due for the preventive services outlined below.  Your care team is available to assist you in scheduling these services.  If you have already completed any of these items, please share that information with your care team to update in your medical record.  Health Maintenance Due   Topic Date Due     RSV VACCINE (Pregnancy & 60+) (1 - 1-dose 60+ series) Never done     Cholesterol Lab  12/21/2023     Annual Wellness Visit  12/21/2023     Learning About Dietary Guidelines  What are the Dietary Guidelines for Americans?     Dietary Guidelines for Americans provide tips for eating well and staying healthy. This helps reduce the risk for long-term (chronic) diseases.  These guidelines recommend that you:  Eat and drink the right amount for you. The U.[place] government's food guide is called MyPlate. It can help you make your own well-balanced eating plan.  Try to balance your eating with your activity. This helps you stay at a healthy weight.  Drink alcohol in moderation, if at all.  Limit foods high in salt, saturated fat, trans fat, and added sugar.  These guidelines are from the U.S. Department of Agriculture and the U.S. Department of Health and Human Services. They are updated every 5 years.  What is MyPlate?  MyPlate is the U.[place] government's food guide. It can help you make your own well-balanced eating plan. A balanced eating plan means that you eat enough, but not too much, and that your food gives you the nutrients you need to stay healthy.  MyPlate focuses on eating plenty of whole grains, fruits, and vegetables, and on limiting fat and sugar. It is available online at www.ChooseMyPlate.gov.  How can you get started?  If you're trying to eat healthier, you can slowly change your eating habits over time. You don't have to make big changes all at once. Start by adding one or two healthy foods to your meals each day.  Grains  Choose whole-grain breads and  "cereals and whole-wheat pasta and whole-grain crackers.  Vegetables  Eat a variety of vegetables every day. They have lots of nutrients and are part of a heart-healthy diet.  Fruits  Eat a variety of fruits every day. Fruits contain lots of nutrients. Choose fresh fruit instead of fruit juice.  Protein foods  Choose fish and lean poultry more often. Eat red meat and fried meats less often. Dried beans, tofu, and nuts are also good sources of protein.  Dairy  Choose low-fat or fat-free products from this food group. If you have problems digesting milk, try eating cheese or yogurt instead.  Fats and oils  Limit fats and oils if you're trying to cut calories. Choose healthy fats when you cook. These include canola oil and olive oil.  Where can you learn more?  Go to https://www.[employer].net/patiented  Enter D676 in the search box to learn more about \"Learning About Dietary Guidelines.\"  Current as of: February 28, 2023               Content Version: 13.8    1253-3102 [employer].   Care instructions adapted under license by your healthcare professional. If you have questions about a medical condition or this instruction, always ask your healthcare professional. Health[employer], [employer] disclaims any warranty or liability for your use of this information.         "

## 2024-01-06 LAB
TRANSFERRIN SERPL-MCNC: 238 MG/DL (ref 200–360)
VIT B12 SERPL-MCNC: 343 PG/ML (ref 232–1245)
VIT D+METAB SERPL-MCNC: 41 NG/ML (ref 20–50)

## 2024-02-19 ENCOUNTER — PATIENT OUTREACH (OUTPATIENT)
Dept: GASTROENTEROLOGY | Facility: CLINIC | Age: 69
End: 2024-02-19

## 2025-01-10 SDOH — HEALTH STABILITY: PHYSICAL HEALTH: ON AVERAGE, HOW MANY MINUTES DO YOU ENGAGE IN EXERCISE AT THIS LEVEL?: 30 MIN

## 2025-01-10 SDOH — HEALTH STABILITY: PHYSICAL HEALTH: ON AVERAGE, HOW MANY DAYS PER WEEK DO YOU ENGAGE IN MODERATE TO STRENUOUS EXERCISE (LIKE A BRISK WALK)?: 3 DAYS

## 2025-01-10 ASSESSMENT — SOCIAL DETERMINANTS OF HEALTH (SDOH): HOW OFTEN DO YOU GET TOGETHER WITH FRIENDS OR RELATIVES?: THREE TIMES A WEEK

## 2025-01-15 ENCOUNTER — LAB (OUTPATIENT)
Dept: LAB | Facility: OTHER | Age: 70
End: 2025-01-15
Attending: FAMILY MEDICINE
Payer: MEDICARE

## 2025-01-15 ENCOUNTER — OFFICE VISIT (OUTPATIENT)
Dept: FAMILY MEDICINE | Facility: OTHER | Age: 70
End: 2025-01-15
Attending: FAMILY MEDICINE
Payer: MEDICARE

## 2025-01-15 VITALS
WEIGHT: 202 LBS | OXYGEN SATURATION: 97 % | SYSTOLIC BLOOD PRESSURE: 126 MMHG | HEIGHT: 66 IN | TEMPERATURE: 97.9 F | RESPIRATION RATE: 16 BRPM | DIASTOLIC BLOOD PRESSURE: 75 MMHG | HEART RATE: 77 BPM | BODY MASS INDEX: 32.47 KG/M2

## 2025-01-15 DIAGNOSIS — R79.89 ELEVATED FERRITIN: ICD-10-CM

## 2025-01-15 DIAGNOSIS — D12.6 TUBULAR ADENOMA OF COLON: ICD-10-CM

## 2025-01-15 DIAGNOSIS — R71.8 ELEVATED RED BLOOD CELL COUNT: ICD-10-CM

## 2025-01-15 DIAGNOSIS — E66.01 MORBID OBESITY (H): ICD-10-CM

## 2025-01-15 DIAGNOSIS — R73.09 ELEVATED GLUCOSE: ICD-10-CM

## 2025-01-15 DIAGNOSIS — D56.9 THALASSEMIA, UNSPECIFIED TYPE: ICD-10-CM

## 2025-01-15 DIAGNOSIS — I10 BENIGN ESSENTIAL HYPERTENSION: ICD-10-CM

## 2025-01-15 DIAGNOSIS — E55.9 HYPOVITAMINOSIS D: ICD-10-CM

## 2025-01-15 DIAGNOSIS — N20.0 NEPHROLITHIASIS: ICD-10-CM

## 2025-01-15 DIAGNOSIS — Z00.00 ENCOUNTER FOR ANNUAL WELLNESS EXAM IN MEDICARE PATIENT: Primary | ICD-10-CM

## 2025-01-15 DIAGNOSIS — E83.19 IRON EXCESS: ICD-10-CM

## 2025-01-15 DIAGNOSIS — E78.00 PURE HYPERCHOLESTEROLEMIA: ICD-10-CM

## 2025-01-15 DIAGNOSIS — R79.89 LOW VITAMIN B12 LEVEL: ICD-10-CM

## 2025-01-15 LAB
ALBUMIN SERPL BCG-MCNC: 4.5 G/DL (ref 3.5–5.2)
ALP SERPL-CCNC: 70 U/L (ref 40–150)
ALT SERPL W P-5'-P-CCNC: 30 U/L (ref 0–70)
ANION GAP SERPL CALCULATED.3IONS-SCNC: 17 MMOL/L (ref 7–15)
AST SERPL W P-5'-P-CCNC: 31 U/L (ref 0–45)
BILIRUB SERPL-MCNC: 1.3 MG/DL
BUN SERPL-MCNC: 12.5 MG/DL (ref 8–23)
CALCIUM SERPL-MCNC: 9.5 MG/DL (ref 8.8–10.4)
CHLORIDE SERPL-SCNC: 104 MMOL/L (ref 98–107)
CHOLEST SERPL-MCNC: 173 MG/DL
CREAT SERPL-MCNC: 0.95 MG/DL (ref 0.67–1.17)
EGFRCR SERPLBLD CKD-EPI 2021: 87 ML/MIN/1.73M2
ERYTHROCYTE [DISTWIDTH] IN BLOOD BY AUTOMATED COUNT: 17.4 % (ref 10–15)
EST. AVERAGE GLUCOSE BLD GHB EST-MCNC: 100 MG/DL
FASTING STATUS PATIENT QL REPORTED: YES
FASTING STATUS PATIENT QL REPORTED: YES
FERRITIN SERPL-MCNC: 1735 NG/ML (ref 31–409)
GLUCOSE SERPL-MCNC: 104 MG/DL (ref 70–99)
HBA1C MFR BLD: 5.1 %
HCO3 SERPL-SCNC: 19 MMOL/L (ref 22–29)
HCT VFR BLD AUTO: 35.6 % (ref 40–53)
HDLC SERPL-MCNC: 61 MG/DL
HGB BLD-MCNC: 11.7 G/DL (ref 13.3–17.7)
HOLD SPECIMEN: NORMAL
HOLD SPECIMEN: NORMAL
IRON BINDING CAPACITY (ROCHE): 271 UG/DL (ref 240–430)
IRON SATN MFR SERPL: 89 % (ref 15–46)
IRON SERPL-MCNC: 242 UG/DL (ref 61–157)
LDLC SERPL CALC-MCNC: 66 MG/DL
MCH RBC QN AUTO: 21.5 PG (ref 26.5–33)
MCHC RBC AUTO-ENTMCNC: 32.9 G/DL (ref 31.5–36.5)
MCV RBC AUTO: 66 FL (ref 78–100)
NONHDLC SERPL-MCNC: 112 MG/DL
PLATELET # BLD AUTO: 190 10E3/UL (ref 150–450)
POTASSIUM SERPL-SCNC: 4.5 MMOL/L (ref 3.4–5.3)
PROT SERPL-MCNC: 7.5 G/DL (ref 6.4–8.3)
RBC # BLD AUTO: 5.43 10E6/UL (ref 4.4–5.9)
RETICS # AUTO: 0.11 10E6/UL (ref 0.03–0.1)
RETICS/RBC NFR AUTO: 2.1 % (ref 0.5–2)
SODIUM SERPL-SCNC: 140 MMOL/L (ref 135–145)
TRIGL SERPL-MCNC: 228 MG/DL
WBC # BLD AUTO: 6.9 10E3/UL (ref 4–11)

## 2025-01-15 PROCEDURE — 85014 HEMATOCRIT: CPT | Mod: ZL | Performed by: FAMILY MEDICINE

## 2025-01-15 PROCEDURE — 83550 IRON BINDING TEST: CPT | Mod: ZL | Performed by: FAMILY MEDICINE

## 2025-01-15 PROCEDURE — 82728 ASSAY OF FERRITIN: CPT | Mod: ZL | Performed by: FAMILY MEDICINE

## 2025-01-15 PROCEDURE — 83540 ASSAY OF IRON: CPT | Mod: ZL | Performed by: FAMILY MEDICINE

## 2025-01-15 PROCEDURE — 82607 VITAMIN B-12: CPT | Mod: ZL | Performed by: FAMILY MEDICINE

## 2025-01-15 PROCEDURE — 82465 ASSAY BLD/SERUM CHOLESTEROL: CPT | Mod: ZL | Performed by: FAMILY MEDICINE

## 2025-01-15 PROCEDURE — G0463 HOSPITAL OUTPT CLINIC VISIT: HCPCS

## 2025-01-15 PROCEDURE — 83036 HEMOGLOBIN GLYCOSYLATED A1C: CPT | Mod: ZL | Performed by: FAMILY MEDICINE

## 2025-01-15 PROCEDURE — 82306 VITAMIN D 25 HYDROXY: CPT | Mod: ZL | Performed by: FAMILY MEDICINE

## 2025-01-15 PROCEDURE — 82435 ASSAY OF BLOOD CHLORIDE: CPT | Mod: ZL | Performed by: FAMILY MEDICINE

## 2025-01-15 PROCEDURE — 36415 COLL VENOUS BLD VENIPUNCTURE: CPT | Mod: ZL

## 2025-01-15 PROCEDURE — 84520 ASSAY OF UREA NITROGEN: CPT | Mod: ZL | Performed by: FAMILY MEDICINE

## 2025-01-15 PROCEDURE — 85060 BLOOD SMEAR INTERPRETATION: CPT | Performed by: PATHOLOGY

## 2025-01-15 PROCEDURE — 80061 LIPID PANEL: CPT | Mod: ZL | Performed by: FAMILY MEDICINE

## 2025-01-15 PROCEDURE — 85045 AUTOMATED RETICULOCYTE COUNT: CPT | Mod: ZL | Performed by: FAMILY MEDICINE

## 2025-01-15 RX ORDER — MV-MN/C/THEANINE/HERB NO.310 1000-200MG
POWDER IN PACKET (EA) ORAL
COMMUNITY
Start: 2024-08-04

## 2025-01-15 SDOH — HEALTH STABILITY: PHYSICAL HEALTH: ON AVERAGE, HOW MANY DAYS PER WEEK DO YOU ENGAGE IN MODERATE TO STRENUOUS EXERCISE (LIKE A BRISK WALK)?: 4 DAYS

## 2025-01-15 SDOH — HEALTH STABILITY: PHYSICAL HEALTH: ON AVERAGE, HOW MANY MINUTES DO YOU ENGAGE IN EXERCISE AT THIS LEVEL?: 20 MIN

## 2025-01-15 ASSESSMENT — PAIN SCALES - GENERAL: PAINLEVEL_OUTOF10: NO PAIN (0)

## 2025-01-15 ASSESSMENT — LIFESTYLE VARIABLES
HOW OFTEN DO YOU HAVE A DRINK CONTAINING ALCOHOL: 4 OR MORE TIMES A WEEK
HOW MANY STANDARD DRINKS CONTAINING ALCOHOL DO YOU HAVE ON A TYPICAL DAY: 1 OR 2

## 2025-01-15 NOTE — PROGRESS NOTES
The longitudinal plan of care for the diagnosis(es)/condition(s) as documented were addressed during this visit. Due to the added complexity in care, I will continue to support Jeet in the subsequent management and with ongoing continuity of care.
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
## 2025-01-16 ENCOUNTER — ONCOLOGY VISIT (OUTPATIENT)
Dept: ONCOLOGY | Facility: OTHER | Age: 70
End: 2025-01-16
Attending: FAMILY MEDICINE
Payer: MEDICARE

## 2025-01-16 VITALS
HEIGHT: 66 IN | DIASTOLIC BLOOD PRESSURE: 74 MMHG | SYSTOLIC BLOOD PRESSURE: 128 MMHG | WEIGHT: 200.84 LBS | HEART RATE: 84 BPM | OXYGEN SATURATION: 96 % | BODY MASS INDEX: 32.28 KG/M2 | TEMPERATURE: 97.6 F

## 2025-01-16 DIAGNOSIS — R79.89 ELEVATED FERRITIN: ICD-10-CM

## 2025-01-16 DIAGNOSIS — E83.19 IRON EXCESS: ICD-10-CM

## 2025-01-16 DIAGNOSIS — E83.10 DISORDER OF IRON METABOLISM, UNSPECIFIED: ICD-10-CM

## 2025-01-16 LAB
BASOPHILS # BLD AUTO: 0.1 10E3/UL (ref 0–0.2)
BASOPHILS NFR BLD AUTO: 1 %
EOSINOPHIL # BLD AUTO: 0.2 10E3/UL (ref 0–0.7)
EOSINOPHIL NFR BLD AUTO: 2 %
IMM GRANULOCYTES # BLD: 0 10E3/UL
IMM GRANULOCYTES NFR BLD: 1 %
LDH SERPL L TO P-CCNC: 146 U/L (ref 0–250)
LYMPHOCYTES # BLD AUTO: 2.7 10E3/UL (ref 0.8–5.3)
LYMPHOCYTES NFR BLD AUTO: 39 %
MONOCYTES # BLD AUTO: 0.9 10E3/UL (ref 0–1.3)
MONOCYTES NFR BLD AUTO: 13 %
NEUTROPHILS # BLD AUTO: 3 10E3/UL (ref 1.6–8.3)
NEUTROPHILS NFR BLD AUTO: 44 %
NRBC # BLD AUTO: 0 10E3/UL
NRBC BLD AUTO-RTO: 0 /100
PATH REPORT.COMMENTS IMP SPEC: NORMAL
PATH REPORT.FINAL DX SPEC: NORMAL
PATH REPORT.MICROSCOPIC SPEC OTHER STN: NORMAL
PATH REPORT.MICROSCOPIC SPEC OTHER STN: NORMAL
VIT B12 SERPL-MCNC: 435 PG/ML (ref 232–1245)
VIT D+METAB SERPL-MCNC: 34 NG/ML (ref 20–50)
WBC # BLD AUTO: 6.8 10E3/UL (ref 4–11)

## 2025-01-16 PROCEDURE — 83615 LACTATE (LD) (LDH) ENZYME: CPT | Mod: ZL | Performed by: INTERNAL MEDICINE

## 2025-01-16 PROCEDURE — G0452 MOLECULAR PATHOLOGY INTERPR: HCPCS | Mod: 26 | Performed by: PATHOLOGY

## 2025-01-16 PROCEDURE — 36415 COLL VENOUS BLD VENIPUNCTURE: CPT | Mod: ZL | Performed by: INTERNAL MEDICINE

## 2025-01-16 PROCEDURE — 85660 RBC SICKLE CELL TEST: CPT | Mod: ZL | Performed by: INTERNAL MEDICINE

## 2025-01-16 PROCEDURE — G0463 HOSPITAL OUTPT CLINIC VISIT: HCPCS

## 2025-01-16 ASSESSMENT — PAIN SCALES - GENERAL: PAINLEVEL_OUTOF10: NO PAIN (0)

## 2025-01-16 ASSESSMENT — PATIENT HEALTH QUESTIONNAIRE - PHQ9: SUM OF ALL RESPONSES TO PHQ QUESTIONS 1-9: 0
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
## 2025-01-18 LAB
HGB A1 MFR BLD: 95.1 %
HGB A2 MFR BLD: 4.5 %
HGB C MFR BLD: 0 %
HGB E MFR BLD: 0 %
HGB F MFR BLD: 0.4 %
HGB FRACT BLD ELPH-IMP: ABNORMAL
HGB OTHER MFR BLD: 0 %
HGB S BLD QL SOLY: ABNORMAL
HGB S MFR BLD: 0 %
PATH INTERP BLD-IMP: ABNORMAL

## 2025-01-21 LAB
BASOPHILS # BLD AUTO: 0.1 10E3/UL (ref 0–0.2)
BASOPHILS NFR BLD AUTO: 1 %
EOSINOPHIL # BLD AUTO: 0.2 10E3/UL (ref 0–0.7)
EOSINOPHIL NFR BLD AUTO: 2 %
IMM GRANULOCYTES # BLD: 0 10E3/UL
IMM GRANULOCYTES NFR BLD: 1 %
LYMPHOCYTES # BLD AUTO: 2.7 10E3/UL (ref 0.8–5.3)
LYMPHOCYTES NFR BLD AUTO: 39 %
MONOCYTES # BLD AUTO: 0.9 10E3/UL (ref 0–1.3)
MONOCYTES NFR BLD AUTO: 13 %
NEUTROPHILS # BLD AUTO: 3 10E3/UL (ref 1.6–8.3)
NEUTROPHILS NFR BLD AUTO: 44 %
NRBC # BLD AUTO: 0 10E3/UL
NRBC BLD AUTO-RTO: 0 /100
WBC # BLD AUTO: 6.8 10E3/UL (ref 4–11)

## 2025-01-23 ENCOUNTER — APPOINTMENT (OUTPATIENT)
Dept: LAB | Facility: OTHER | Age: 70
End: 2025-01-23
Attending: FAMILY MEDICINE
Payer: MEDICARE

## 2025-01-23 ENCOUNTER — OFFICE VISIT (OUTPATIENT)
Dept: UROLOGY | Facility: OTHER | Age: 70
End: 2025-01-23
Attending: FAMILY MEDICINE
Payer: MEDICARE

## 2025-01-23 VITALS
HEART RATE: 82 BPM | RESPIRATION RATE: 18 BRPM | DIASTOLIC BLOOD PRESSURE: 71 MMHG | SYSTOLIC BLOOD PRESSURE: 128 MMHG | OXYGEN SATURATION: 96 %

## 2025-01-23 DIAGNOSIS — N20.0 NEPHROLITHIASIS: ICD-10-CM

## 2025-01-23 PROCEDURE — G0463 HOSPITAL OUTPT CLINIC VISIT: HCPCS

## 2025-01-23 ASSESSMENT — PAIN SCALES - GENERAL: PAINLEVEL_OUTOF10: NO PAIN (0)
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
## 2025-01-28 DIAGNOSIS — R79.89 ELEVATED FERRITIN: Primary | ICD-10-CM

## 2025-01-28 DIAGNOSIS — E83.10 DISORDER OF IRON METABOLISM, UNSPECIFIED: ICD-10-CM

## 2025-01-28 DIAGNOSIS — E83.19 IRON EXCESS: ICD-10-CM

## 2025-01-28 NOTE — PROGRESS NOTES
TC to [facility] in [place] inquiring about correct order for Iron loading MRI for liver. Informed to order MRI with and without contrast and in comments iron loading protocol.     VORB from Dr. Elisha burciaga to enter order and send to [facility]

## 2025-01-30 ENCOUNTER — HOSPITAL ENCOUNTER (OUTPATIENT)
Dept: CARDIOLOGY | Facility: HOSPITAL | Age: 70
End: 2025-01-30
Attending: INTERNAL MEDICINE
Payer: MEDICARE

## 2025-01-30 DIAGNOSIS — E83.19 IRON EXCESS: ICD-10-CM

## 2025-01-30 DIAGNOSIS — R79.89 ELEVATED FERRITIN: ICD-10-CM

## 2025-01-30 DIAGNOSIS — E83.10 DISORDER OF IRON METABOLISM, UNSPECIFIED: ICD-10-CM

## 2025-01-30 LAB — LVEF ECHO: NORMAL

## 2025-01-30 PROCEDURE — 93306 TTE W/DOPPLER COMPLETE: CPT

## 2025-01-31 ENCOUNTER — TRANSFERRED RECORDS (OUTPATIENT)
Dept: HEALTH INFORMATION MANAGEMENT | Facility: CLINIC | Age: 70
End: 2025-01-31

## 2025-02-18 DIAGNOSIS — E88.810 DYSMETABOLIC SYNDROME X: ICD-10-CM

## 2025-02-18 RX ORDER — POTASSIUM CITRATE 10 MEQ/1
TABLET, EXTENDED RELEASE ORAL
Qty: 360 TABLET | Refills: 3 | Status: SHIPPED | OUTPATIENT
Start: 2025-02-18

## 2025-02-18 NOTE — TELEPHONE ENCOUNTER
Routing refill request to provider for review/approval because:  Drug not on the [facility], [facility] or [facility] refill protocol or controlled substance

## 2025-02-18 NOTE — TELEPHONE ENCOUNTER
Potassium Citrate      Last Written Prescription Date:  1/5/24  Last Fill Quantity: 360,   # refills: 3  Last Office Visit: 1/15/25  Future Office visit:    Next 5 appointments (look out 90 days)      Feb 20, 2025 9:00 AM  (Arrive by 8:45 AM)  Return Visit with Dawna Tello MD  [facility] ([facility] [address] [facility] [address]  [phone]     Mar 20, 2025 2:30 PM  (Arrive by 2:15 PM)  Return Visit with Bart Everett MD  [facility] ([facility] ) [address] [address]  [phone]             Routing refill request to provider for review/approval because:

## 2025-02-20 ENCOUNTER — ONCOLOGY VISIT (OUTPATIENT)
Dept: ONCOLOGY | Facility: OTHER | Age: 70
End: 2025-02-20
Attending: INTERNAL MEDICINE
Payer: MEDICARE

## 2025-02-20 VITALS
WEIGHT: 203.71 LBS | TEMPERATURE: 96.9 F | HEIGHT: 66 IN | DIASTOLIC BLOOD PRESSURE: 72 MMHG | HEART RATE: 79 BPM | SYSTOLIC BLOOD PRESSURE: 126 MMHG | BODY MASS INDEX: 32.74 KG/M2 | OXYGEN SATURATION: 98 %

## 2025-02-20 DIAGNOSIS — E83.19 IRON EXCESS: Primary | ICD-10-CM

## 2025-02-20 LAB
FERRITIN SERPL-MCNC: 1232 NG/ML (ref 31–409)
IRON BINDING CAPACITY (ROCHE): 257 UG/DL (ref 240–430)
IRON SATN MFR SERPL: 29 % (ref 15–46)
IRON SERPL-MCNC: 75 UG/DL (ref 61–157)

## 2025-02-20 PROCEDURE — 83540 ASSAY OF IRON: CPT | Mod: ZL | Performed by: INTERNAL MEDICINE

## 2025-02-20 PROCEDURE — 82728 ASSAY OF FERRITIN: CPT | Mod: ZL | Performed by: INTERNAL MEDICINE

## 2025-02-20 PROCEDURE — G0463 HOSPITAL OUTPT CLINIC VISIT: HCPCS

## 2025-02-20 PROCEDURE — 36415 COLL VENOUS BLD VENIPUNCTURE: CPT | Mod: ZL | Performed by: INTERNAL MEDICINE

## 2025-02-20 ASSESSMENT — PAIN SCALES - GENERAL: PAINLEVEL_OUTOF10: NO PAIN (0)
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
## 2025-02-20 NOTE — NURSING NOTE
"Oncology Rooming Note    February 20, 2025 8:55 AM   Maxx Canales is a 69 year old male who presents for:    Chief Complaint   Patient presents with    Hematology     Follow up. Iron excess      Initial Vitals: /72 (BP Location: Right arm, Patient Position: Sitting, Cuff Size: Adult Large)   Pulse 79   Temp 96.9  F (36.1  C) (Tympanic)   Ht 1.676 m (5' 6\")   Wt 92.4 kg (203 lb 11.3 oz)   SpO2 98%   BMI 32.88 kg/m   Estimated body mass index is 32.88 kg/m  as calculated from the following:    Height as of this encounter: 1.676 m (5' 6\").    Weight as of this encounter: 92.4 kg (203 lb 11.3 oz). Body surface area is 2.07 meters squared.  No Pain (0) Comment: Data Unavailable   No LMP for male patient.  Allergies reviewed: Yes  Medications reviewed: Yes    Medications: Medication refills not needed today.  Pharmacy name entered into [employer]:    [facility] [address] [place], [place] - [address] [facility] [address]  [facility] - [place], [place] - [address]    Frailty Screening:   Is the patient here for a new oncology consult visit in cancer care? 2. No    PHQ9:  Did this patient require a PHQ9?: No      Clinical concerns: discuss labs and MRI, US, and Echo results  Dr Tello was notified.      Shell Hall LPN              "

## 2025-03-16 DIAGNOSIS — E78.00 PURE HYPERCHOLESTEROLEMIA: ICD-10-CM

## 2025-03-16 DIAGNOSIS — I10 BENIGN ESSENTIAL HYPERTENSION: ICD-10-CM

## 2025-03-17 RX ORDER — LISINOPRIL 40 MG/1
40 TABLET ORAL DAILY
Qty: 90 TABLET | Refills: 0 | Status: SHIPPED | OUTPATIENT
Start: 2025-03-17

## 2025-03-17 RX ORDER — SIMVASTATIN 80 MG
80 TABLET ORAL AT BEDTIME
Qty: 90 TABLET | Refills: 0 | Status: SHIPPED | OUTPATIENT
Start: 2025-03-17

## 2025-03-17 NOTE — TELEPHONE ENCOUNTER
Simvastatin      Last Written Prescription Date:  1/5/24  Last Fill Quantity: 90,   # refills: 3  Last Office Visit: 1/15/25  Future Office visit:    Next 5 appointments (look out 90 days)      Mar 20, 2025 2:30 PM  (Arrive by 2:15 PM)  Return Visit with Bart Everett MD  [facility] ([facility] ) [address]  [place] [address]  [phone]     May 20, 2025 9:30 AM  (Arrive by 9:15 AM)  Return Visit with Dawna Tello MD  [facility] ([facility] ) [address]  [facility] [address]  [phone]             Routing refill request to provider for review/approval because:      Lisinopril      Last Written Prescription Date:  1/5/24  Last Fill Quantity: 90,   # refills: 3  Last Office Visit: 1/15/25  Future Office visit:    Next 5 appointments (look out 90 days)      Mar 20, 2025 2:30 PM  (Arrive by 2:15 PM)  Return Visit with Bart Everett MD  [facility] ([facility] ) [address]  [place] [address]  [phone]     May 20, 2025 9:30 AM  (Arrive by 9:15 AM)  Return Visit with Dawna Tello MD  [facility] ([facility] ) [address]  [facility] [address]  [phone]             Routing refill request to provider for review/approval because:

## 2025-03-20 ENCOUNTER — OFFICE VISIT (OUTPATIENT)
Dept: UROLOGY | Facility: OTHER | Age: 70
End: 2025-03-20
Attending: UROLOGY
Payer: MEDICARE

## 2025-03-20 ENCOUNTER — TELEPHONE (OUTPATIENT)
Dept: UROLOGY | Facility: OTHER | Age: 70
End: 2025-03-20

## 2025-03-20 VITALS
RESPIRATION RATE: 20 BRPM | DIASTOLIC BLOOD PRESSURE: 70 MMHG | HEIGHT: 66 IN | SYSTOLIC BLOOD PRESSURE: 114 MMHG | WEIGHT: 203.71 LBS | OXYGEN SATURATION: 96 % | BODY MASS INDEX: 32.74 KG/M2 | HEART RATE: 85 BPM

## 2025-03-20 DIAGNOSIS — N20.0 NEPHROLITHIASIS: Primary | ICD-10-CM

## 2025-03-20 DIAGNOSIS — E88.810 DYSMETABOLIC SYNDROME X: ICD-10-CM

## 2025-03-20 PROCEDURE — G0463 HOSPITAL OUTPT CLINIC VISIT: HCPCS | Mod: 25

## 2025-03-20 RX ORDER — POTASSIUM CITRATE 1080 MG/1
TABLET, EXTENDED RELEASE ORAL
Qty: 1080 TABLET | Refills: 3 | Status: SHIPPED | OUTPATIENT
Start: 2025-03-20

## 2025-03-20 ASSESSMENT — PAIN SCALES - GENERAL: PAINLEVEL_OUTOF10: NO PAIN (0)

## 2025-03-20 NOTE — PROGRESS NOTES
Jeet returns for discussion of his uric acid kidney stones. He was originally seen in this office 1/23/25. History at that time included:    He has had 2 separate surgical interventions for stones. Dr Reeves indicated in his last note 2021 that the patient makes uric acid stones. He has been taking K citrate 4x daily and drinking lots of fluid. He passed a stone 10/2023 associated with gross hematuria and urinary urgency. He feels fine now     Our plan at that time was:    Will repeat his 24 hr urine and obtain renal ultrasound. Fortunately he did not make any large stones between 2021 and 2023. He is well versed in dietary prevention of kidney stones. Follow up in 2 months. May be able to decrease K citrate dose based on Litholink result     Today 3/20/25 Jeet follows up with a litholink and renal ultrasound. No interval stone events. He drinks a lot of water and adds lemon juice in addition to 1080meq K citrate BID.    Renal ultrasound personally reviewed: no stones, masses, or hydronephrosis.    Litholink:    Excellent parameters throughout except urine very alkaline at pH 6.9.    Impression: history of uric acid kidney stones, urine overly alkalinized on K citrate.    Plan: decrease K citrate to 1 tab BID. Continue other dietary stone prevention measures. Follow up 1 year with OTF and Litholink.

## 2025-05-01 ENCOUNTER — TELEPHONE (OUTPATIENT)
Dept: UROLOGY | Facility: OTHER | Age: 70
End: 2025-05-01

## 2025-05-01 ENCOUNTER — HOSPITAL ENCOUNTER (OUTPATIENT)
Dept: CT IMAGING | Facility: HOSPITAL | Age: 70
Discharge: HOME | End: 2025-05-01
Attending: NURSE PRACTITIONER
Payer: MEDICARE

## 2025-05-01 ENCOUNTER — LAB (OUTPATIENT)
Dept: LAB | Facility: OTHER | Age: 70
End: 2025-05-01
Attending: NURSE PRACTITIONER
Payer: MEDICARE

## 2025-05-01 DIAGNOSIS — N20.0 NEPHROLITHIASIS: ICD-10-CM

## 2025-05-01 DIAGNOSIS — N20.0 NEPHROLITHIASIS: Primary | ICD-10-CM

## 2025-05-01 LAB
ALBUMIN UR-MCNC: 300 MG/DL
APPEARANCE UR: ABNORMAL
BILIRUB UR QL STRIP: NEGATIVE
COLOR UR AUTO: ABNORMAL
GLUCOSE UR STRIP-MCNC: NEGATIVE MG/DL
HGB UR QL STRIP: ABNORMAL
KETONES UR STRIP-MCNC: NEGATIVE MG/DL
LEUKOCYTE ESTERASE UR QL STRIP: ABNORMAL
MUCOUS THREADS #/AREA URNS LPF: PRESENT /LPF
NITRATE UR QL: NEGATIVE
PH UR STRIP: 6 [PH] (ref 4.7–8)
RBC URINE: >182 /HPF
SP GR UR STRIP: 1.02 (ref 1–1.03)
SQUAMOUS EPITHELIAL: 3 /HPF
UROBILINOGEN UR STRIP-MCNC: 2 MG/DL
WBC CLUMPS #/AREA URNS HPF: PRESENT /HPF
WBC URINE: >182 /HPF

## 2025-05-01 PROCEDURE — 74178 CT ABD&PLV WO CNTR FLWD CNTR: CPT

## 2025-05-01 PROCEDURE — 87186 SC STD MICRODIL/AGAR DIL: CPT | Mod: ZL

## 2025-05-01 PROCEDURE — 74178 CT ABD&PLV WO CNTR FLWD CNTR: CPT | Mod: 26 | Performed by: RADIOLOGY

## 2025-05-01 PROCEDURE — 250N000011 HC RX IP 250 OP 636: Performed by: RADIOLOGY

## 2025-05-01 PROCEDURE — 81003 URINALYSIS AUTO W/O SCOPE: CPT | Mod: ZL

## 2025-05-01 RX ORDER — IOPAMIDOL 755 MG/ML
97 INJECTION, SOLUTION INTRAVASCULAR ONCE
Status: COMPLETED | OUTPATIENT
Start: 2025-05-01 | End: 2025-05-01

## 2025-05-01 RX ORDER — SULFAMETHOXAZOLE AND TRIMETHOPRIM 800; 160 MG/1; MG/1
1 TABLET ORAL 2 TIMES DAILY
Qty: 14 TABLET | Refills: 0 | Status: SHIPPED | OUTPATIENT
Start: 2025-05-01

## 2025-05-01 RX ADMIN — IOPAMIDOL 97 ML: 755 INJECTION, SOLUTION INTRAVENOUS at 13:50

## 2025-05-01 NOTE — PROGRESS NOTES
Patients UA shows large blood, protein 300, large leuk esterase. Results reviewed with Dr Escobar. Plan is to have patient set up for an urgent stone protocol CT. Order placed for CT urogram. Patient aware that he will be getting a call to schedule the CT. Will treat with Bactrim for now due to urinary symptoms including dysuria and fever.

## 2025-05-01 NOTE — TELEPHONE ENCOUNTER
"Pt called regarding symptoms thinking it may be a kidney stone. Reviewed s/s with pts wife, urinary frequency, pain when urinating that \"moves\", no back pain, has a slight fever of 99.6, and isn't urinating his normal volumes. Ordered UA for today, advised culture takes a few days. This is to rule out infection before doing kidney stone protocols.     Thank you,     Felipa RODRIGUEZ, BSN, PHN  RN Care Coordinator Urology  [facility]    "

## 2025-05-02 ENCOUNTER — PREP FOR PROCEDURE (OUTPATIENT)
Dept: SURGERY | Facility: OTHER | Age: 70
End: 2025-05-02

## 2025-05-02 DIAGNOSIS — K40.20 BILATERAL INGUINAL HERNIA: Primary | ICD-10-CM

## 2025-05-02 NOTE — PROGRESS NOTES
BILATERAL OPEN INGUINAL HERNIA REPAIR WITH MESH   Dr Ochoa  May 22nd   PAT May 13th  preop scheduled

## 2025-05-03 LAB — BACTERIA UR CULT: ABNORMAL

## 2025-05-05 ENCOUNTER — TELEPHONE (OUTPATIENT)
Dept: UROLOGY | Facility: OTHER | Age: 70
End: 2025-05-05

## 2025-05-05 NOTE — TELEPHONE ENCOUNTER
Pt called because RX went to wrong pharmacy and now pt has a very large supply of potassium citrate. Advised to look at expiration date of old rx and continue with Dr. Lucero change in rx dosing. His future rx goes to [facility] [place].     Thank you,     Felipa RODRIGUEZ, BSN, PHN  RN Care Coordinator Urology  [facility]

## 2025-05-09 ENCOUNTER — APPOINTMENT (OUTPATIENT)
Dept: CT IMAGING | Facility: HOSPITAL | Age: 70
End: 2025-05-09
Attending: STUDENT IN AN ORGANIZED HEALTH CARE EDUCATION/TRAINING PROGRAM
Payer: MEDICARE

## 2025-05-09 ENCOUNTER — HOSPITAL ENCOUNTER (OUTPATIENT)
Facility: HOSPITAL | Age: 70
Setting detail: OBSERVATION
Discharge: HOME | End: 2025-05-10
Attending: STUDENT IN AN ORGANIZED HEALTH CARE EDUCATION/TRAINING PROGRAM | Admitting: INTERNAL MEDICINE
Payer: MEDICARE

## 2025-05-09 ENCOUNTER — LAB (OUTPATIENT)
Dept: LAB | Facility: OTHER | Age: 70
End: 2025-05-09
Payer: COMMERCIAL

## 2025-05-09 DIAGNOSIS — I47.10 SVT (SUPRAVENTRICULAR TACHYCARDIA): ICD-10-CM

## 2025-05-09 DIAGNOSIS — E87.5 HYPERKALEMIA: ICD-10-CM

## 2025-05-09 DIAGNOSIS — D64.9 ANEMIA, UNSPECIFIED TYPE: ICD-10-CM

## 2025-05-09 DIAGNOSIS — R79.89 ELEVATED FERRITIN: ICD-10-CM

## 2025-05-09 DIAGNOSIS — R71.8 ELEVATED RED BLOOD CELL COUNT: ICD-10-CM

## 2025-05-09 DIAGNOSIS — E78.00 PURE HYPERCHOLESTEROLEMIA: ICD-10-CM

## 2025-05-09 DIAGNOSIS — I10 BENIGN ESSENTIAL HYPERTENSION: ICD-10-CM

## 2025-05-09 LAB
ALBUMIN SERPL BCG-MCNC: 3.9 G/DL (ref 3.5–5.2)
ALBUMIN UR-MCNC: 10 MG/DL
ALP SERPL-CCNC: 75 U/L (ref 40–150)
ALT SERPL W P-5'-P-CCNC: 28 U/L (ref 0–70)
ANION GAP SERPL CALCULATED.3IONS-SCNC: 12 MMOL/L (ref 7–15)
ANION GAP SERPL CALCULATED.3IONS-SCNC: 8 MMOL/L (ref 7–15)
APPEARANCE UR: CLEAR
AST SERPL W P-5'-P-CCNC: 25 U/L (ref 0–45)
BACTERIA #/AREA URNS HPF: ABNORMAL /HPF
BASE EXCESS BLDV CALC-SCNC: -5 MMOL/L (ref -3–3)
BASOPHILS # BLD AUTO: 0.1 10E3/UL (ref 0–0.2)
BASOPHILS NFR BLD AUTO: 1 %
BILIRUB SERPL-MCNC: 0.4 MG/DL
BILIRUB UR QL STRIP: NEGATIVE
BUN SERPL-MCNC: 33 MG/DL (ref 8–23)
BUN SERPL-MCNC: 35 MG/DL (ref 8–23)
CALCIUM SERPL-MCNC: 9.1 MG/DL (ref 8.8–10.4)
CALCIUM SERPL-MCNC: 9.4 MG/DL (ref 8.8–10.4)
CHLORIDE SERPL-SCNC: 104 MMOL/L (ref 98–107)
CHLORIDE SERPL-SCNC: 106 MMOL/L (ref 98–107)
COLOR UR AUTO: YELLOW
CREAT SERPL-MCNC: 1.99 MG/DL (ref 0.67–1.17)
CREAT SERPL-MCNC: 2.25 MG/DL (ref 0.67–1.17)
EGFRCR SERPLBLD CKD-EPI 2021: 31 ML/MIN/1.73M2
EGFRCR SERPLBLD CKD-EPI 2021: 36 ML/MIN/1.73M2
EOSINOPHIL # BLD AUTO: 0.1 10E3/UL (ref 0–0.7)
EOSINOPHIL NFR BLD AUTO: 1 %
ERYTHROCYTE [DISTWIDTH] IN BLOOD BY AUTOMATED COUNT: 15.3 % (ref 10–15)
ERYTHROCYTE [DISTWIDTH] IN BLOOD BY AUTOMATED COUNT: 15.4 % (ref 10–15)
FERRITIN SERPL-MCNC: 1645 NG/ML (ref 31–409)
GLUCOSE BLDC GLUCOMTR-MCNC: 144 MG/DL (ref 70–99)
GLUCOSE SERPL-MCNC: 93 MG/DL (ref 70–99)
GLUCOSE SERPL-MCNC: 95 MG/DL (ref 70–99)
GLUCOSE UR STRIP-MCNC: NEGATIVE MG/DL
HCO3 BLDV-SCNC: 19 MMOL/L (ref 21–28)
HCO3 SERPL-SCNC: 20 MMOL/L (ref 22–29)
HCO3 SERPL-SCNC: 20 MMOL/L (ref 22–29)
HCT VFR BLD AUTO: 29.6 % (ref 40–53)
HCT VFR BLD AUTO: 30.1 % (ref 40–53)
HGB BLD-MCNC: 9.6 G/DL (ref 13.3–17.7)
HGB BLD-MCNC: 9.9 G/DL (ref 13.3–17.7)
HGB UR QL STRIP: NEGATIVE
HOLD SPECIMEN: NORMAL
HYALINE CASTS: 25 /LPF
IMM GRANULOCYTES # BLD: 0.1 10E3/UL
IMM GRANULOCYTES NFR BLD: 1 %
IRON BINDING CAPACITY (ROCHE): 243 UG/DL (ref 240–430)
IRON SATN MFR SERPL: 29 % (ref 15–46)
IRON SERPL-MCNC: 71 UG/DL (ref 61–157)
KETONES UR STRIP-MCNC: NEGATIVE MG/DL
LACTATE SERPL-SCNC: 1 MMOL/L (ref 0.7–2)
LEUKOCYTE ESTERASE UR QL STRIP: ABNORMAL
LYMPHOCYTES # BLD AUTO: 2.3 10E3/UL (ref 0.8–5.3)
LYMPHOCYTES NFR BLD AUTO: 33 %
MCH RBC QN AUTO: 20.2 PG (ref 26.5–33)
MCH RBC QN AUTO: 20.2 PG (ref 26.5–33)
MCHC RBC AUTO-ENTMCNC: 32.4 G/DL (ref 31.5–36.5)
MCHC RBC AUTO-ENTMCNC: 32.9 G/DL (ref 31.5–36.5)
MCV RBC AUTO: 62 FL (ref 78–100)
MCV RBC AUTO: 62 FL (ref 78–100)
MONOCYTES # BLD AUTO: 0.9 10E3/UL (ref 0–1.3)
MONOCYTES NFR BLD AUTO: 14 %
MUCOUS THREADS #/AREA URNS LPF: PRESENT /LPF
NEUTROPHILS # BLD AUTO: 3.5 10E3/UL (ref 1.6–8.3)
NEUTROPHILS NFR BLD AUTO: 51 %
NITRATE UR QL: NEGATIVE
NRBC # BLD AUTO: 0 10E3/UL
NRBC BLD AUTO-RTO: 0 /100
O2/TOTAL GAS SETTING VFR VENT: 21 %
OXYHGB MFR BLDV: 91 % (ref 70–75)
PCO2 BLDV: 32 MM HG (ref 40–50)
PH BLDV: 7.38 [PH] (ref 7.32–7.43)
PH UR STRIP: 6 [PH] (ref 4.7–8)
PLAT MORPH BLD: NORMAL
PLATELET # BLD AUTO: 365 10E3/UL (ref 150–450)
PLATELET # BLD AUTO: 473 10E3/UL (ref 150–450)
PO2 BLDV: 77 MM HG (ref 25–47)
POTASSIUM SERPL-SCNC: 5.1 MMOL/L (ref 3.4–5.3)
POTASSIUM SERPL-SCNC: 6.9 MMOL/L (ref 3.4–5.3)
POTASSIUM SERPL-SCNC: 7.2 MMOL/L (ref 3.4–5.3)
PROT SERPL-MCNC: 7.4 G/DL (ref 6.4–8.3)
RBC # BLD AUTO: 4.76 10E6/UL (ref 4.4–5.9)
RBC # BLD AUTO: 4.89 10E6/UL (ref 4.4–5.9)
RBC MORPH BLD: NORMAL
RBC URINE: 1 /HPF
SAO2 % BLDV: 94.6 % (ref 70–75)
SODIUM SERPL-SCNC: 134 MMOL/L (ref 135–145)
SODIUM SERPL-SCNC: 136 MMOL/L (ref 135–145)
SP GR UR STRIP: 1.02 (ref 1–1.03)
SQUAMOUS EPITHELIAL: <1 /HPF
UROBILINOGEN UR STRIP-MCNC: NORMAL MG/DL
WBC # BLD AUTO: 6.9 10E3/UL (ref 4–11)
WBC # BLD AUTO: 8.9 10E3/UL (ref 4–11)
WBC CLUMPS #/AREA URNS HPF: PRESENT /HPF
WBC URINE: 4 /HPF

## 2025-05-09 PROCEDURE — 250N000009 HC RX 250: Performed by: STUDENT IN AN ORGANIZED HEALTH CARE EDUCATION/TRAINING PROGRAM

## 2025-05-09 PROCEDURE — 96361 HYDRATE IV INFUSION ADD-ON: CPT | Performed by: STUDENT IN AN ORGANIZED HEALTH CARE EDUCATION/TRAINING PROGRAM

## 2025-05-09 PROCEDURE — 83605 ASSAY OF LACTIC ACID: CPT | Performed by: INTERNAL MEDICINE

## 2025-05-09 PROCEDURE — 36415 COLL VENOUS BLD VENIPUNCTURE: CPT | Performed by: STUDENT IN AN ORGANIZED HEALTH CARE EDUCATION/TRAINING PROGRAM

## 2025-05-09 PROCEDURE — 250N000012 HC RX MED GY IP 250 OP 636 PS 637: Performed by: STUDENT IN AN ORGANIZED HEALTH CARE EDUCATION/TRAINING PROGRAM

## 2025-05-09 PROCEDURE — 82805 BLOOD GASES W/O2 SATURATION: CPT | Performed by: STUDENT IN AN ORGANIZED HEALTH CARE EDUCATION/TRAINING PROGRAM

## 2025-05-09 PROCEDURE — 36415 COLL VENOUS BLD VENIPUNCTURE: CPT

## 2025-05-09 PROCEDURE — 82728 ASSAY OF FERRITIN: CPT | Mod: ZL

## 2025-05-09 PROCEDURE — 258N000003 HC RX IP 258 OP 636: Performed by: INTERNAL MEDICINE

## 2025-05-09 PROCEDURE — 84132 ASSAY OF SERUM POTASSIUM: CPT

## 2025-05-09 PROCEDURE — 85014 HEMATOCRIT: CPT | Performed by: INTERNAL MEDICINE

## 2025-05-09 PROCEDURE — 82310 ASSAY OF CALCIUM: CPT | Performed by: INTERNAL MEDICINE

## 2025-05-09 PROCEDURE — 83540 ASSAY OF IRON: CPT | Mod: ZL

## 2025-05-09 PROCEDURE — 74176 CT ABD & PELVIS W/O CONTRAST: CPT | Mod: 26 | Performed by: RADIOLOGY

## 2025-05-09 PROCEDURE — 74176 CT ABD & PELVIS W/O CONTRAST: CPT

## 2025-05-09 PROCEDURE — 250N000013 HC RX MED GY IP 250 OP 250 PS 637: Performed by: INTERNAL MEDICINE

## 2025-05-09 PROCEDURE — 36415 COLL VENOUS BLD VENIPUNCTURE: CPT | Performed by: INTERNAL MEDICINE

## 2025-05-09 PROCEDURE — 96376 TX/PRO/DX INJ SAME DRUG ADON: CPT

## 2025-05-09 PROCEDURE — 96375 TX/PRO/DX INJ NEW DRUG ADDON: CPT | Performed by: STUDENT IN AN ORGANIZED HEALTH CARE EDUCATION/TRAINING PROGRAM

## 2025-05-09 PROCEDURE — 85025 COMPLETE CBC W/AUTO DIFF WBC: CPT | Mod: ZL

## 2025-05-09 PROCEDURE — 250N000011 HC RX IP 250 OP 636: Performed by: STUDENT IN AN ORGANIZED HEALTH CARE EDUCATION/TRAINING PROGRAM

## 2025-05-09 PROCEDURE — 99291 CRITICAL CARE FIRST HOUR: CPT | Performed by: STUDENT IN AN ORGANIZED HEALTH CARE EDUCATION/TRAINING PROGRAM

## 2025-05-09 PROCEDURE — 82962 GLUCOSE BLOOD TEST: CPT

## 2025-05-09 PROCEDURE — 99291 CRITICAL CARE FIRST HOUR: CPT | Mod: 25 | Performed by: STUDENT IN AN ORGANIZED HEALTH CARE EDUCATION/TRAINING PROGRAM

## 2025-05-09 PROCEDURE — 93005 ELECTROCARDIOGRAM TRACING: CPT | Performed by: STUDENT IN AN ORGANIZED HEALTH CARE EDUCATION/TRAINING PROGRAM

## 2025-05-09 PROCEDURE — 36415 COLL VENOUS BLD VENIPUNCTURE: CPT | Mod: ZL

## 2025-05-09 PROCEDURE — 120N000001 HC R&B MED SURG/OB

## 2025-05-09 PROCEDURE — 250N000013 HC RX MED GY IP 250 OP 250 PS 637: Performed by: STUDENT IN AN ORGANIZED HEALTH CARE EDUCATION/TRAINING PROGRAM

## 2025-05-09 PROCEDURE — 81003 URINALYSIS AUTO W/O SCOPE: CPT | Performed by: INTERNAL MEDICINE

## 2025-05-09 PROCEDURE — 96374 THER/PROPH/DIAG INJ IV PUSH: CPT | Performed by: STUDENT IN AN ORGANIZED HEALTH CARE EDUCATION/TRAINING PROGRAM

## 2025-05-09 PROCEDURE — 258N000001 HC RX 258: Performed by: STUDENT IN AN ORGANIZED HEALTH CARE EDUCATION/TRAINING PROGRAM

## 2025-05-09 PROCEDURE — 82435 ASSAY OF BLOOD CHLORIDE: CPT | Mod: ZL

## 2025-05-09 PROCEDURE — 94640 AIRWAY INHALATION TREATMENT: CPT

## 2025-05-09 PROCEDURE — 999N000157 HC STATISTIC RCP TIME EA 10 MIN

## 2025-05-09 RX ORDER — NICOTINE POLACRILEX 4 MG
15-30 LOZENGE BUCCAL
Status: DISCONTINUED | OUTPATIENT
Start: 2025-05-09 | End: 2025-05-09

## 2025-05-09 RX ORDER — DEXTROSE MONOHYDRATE 25 G/50ML
25-50 INJECTION, SOLUTION INTRAVENOUS
Status: DISCONTINUED | OUTPATIENT
Start: 2025-05-09 | End: 2025-05-09

## 2025-05-09 RX ORDER — LIDOCAINE 40 MG/G
CREAM TOPICAL
Status: DISCONTINUED | OUTPATIENT
Start: 2025-05-09 | End: 2025-05-10 | Stop reason: HOSPADM

## 2025-05-09 RX ORDER — DEXTROSE MONOHYDRATE 25 G/50ML
25 INJECTION, SOLUTION INTRAVENOUS ONCE
Status: DISCONTINUED | OUTPATIENT
Start: 2025-05-09 | End: 2025-05-09

## 2025-05-09 RX ORDER — CALCIUM CARBONATE 500 MG/1
1000 TABLET, CHEWABLE ORAL 4 TIMES DAILY PRN
Status: DISCONTINUED | OUTPATIENT
Start: 2025-05-09 | End: 2025-05-10 | Stop reason: HOSPADM

## 2025-05-09 RX ORDER — AMOXICILLIN 250 MG
2 CAPSULE ORAL 2 TIMES DAILY PRN
Status: DISCONTINUED | OUTPATIENT
Start: 2025-05-09 | End: 2025-05-10 | Stop reason: HOSPADM

## 2025-05-09 RX ORDER — CALCIUM GLUCONATE 20 MG/ML
1 INJECTION, SOLUTION INTRAVENOUS ONCE
Status: COMPLETED | OUTPATIENT
Start: 2025-05-09 | End: 2025-05-09

## 2025-05-09 RX ORDER — SIMVASTATIN 20 MG
80 TABLET ORAL EVERY MORNING
Status: DISCONTINUED | OUTPATIENT
Start: 2025-05-10 | End: 2025-05-10 | Stop reason: HOSPADM

## 2025-05-09 RX ORDER — AMOXICILLIN 250 MG
1 CAPSULE ORAL 2 TIMES DAILY PRN
Status: DISCONTINUED | OUTPATIENT
Start: 2025-05-09 | End: 2025-05-10 | Stop reason: HOSPADM

## 2025-05-09 RX ORDER — ALBUTEROL SULFATE 5 MG/ML
10 SOLUTION RESPIRATORY (INHALATION) ONCE
Status: COMPLETED | OUTPATIENT
Start: 2025-05-09 | End: 2025-05-09

## 2025-05-09 RX ORDER — DEXTROSE MONOHYDRATE AND SODIUM CHLORIDE 5; .9 G/100ML; G/100ML
INJECTION, SOLUTION INTRAVENOUS CONTINUOUS
Status: DISCONTINUED | OUTPATIENT
Start: 2025-05-09 | End: 2025-05-10 | Stop reason: HOSPADM

## 2025-05-09 RX ORDER — INDOMETHACIN 25 MG/1
50 CAPSULE ORAL ONCE
Status: COMPLETED | OUTPATIENT
Start: 2025-05-09 | End: 2025-05-09

## 2025-05-09 RX ORDER — DEXTROSE MONOHYDRATE 25 G/50ML
25 INJECTION, SOLUTION INTRAVENOUS ONCE
Status: COMPLETED | OUTPATIENT
Start: 2025-05-09 | End: 2025-05-09

## 2025-05-09 RX ADMIN — HUMAN INSULIN 9 UNITS: 100 INJECTION, SOLUTION SUBCUTANEOUS at 14:44

## 2025-05-09 RX ADMIN — SODIUM BICARBONATE 50 MEQ: 84 INJECTION INTRAVENOUS at 14:43

## 2025-05-09 RX ADMIN — SODIUM ZIRCONIUM CYCLOSILICATE 10 G: 5 POWDER, FOR SUSPENSION ORAL at 22:48

## 2025-05-09 RX ADMIN — DEXTROSE AND SODIUM CHLORIDE: 5; .9 INJECTION, SOLUTION INTRAVENOUS at 22:50

## 2025-05-09 RX ADMIN — CALCIUM GLUCONATE 1 G: 20 INJECTION, SOLUTION INTRAVENOUS at 14:44

## 2025-05-09 RX ADMIN — SODIUM ZIRCONIUM CYCLOSILICATE 10 G: 5 POWDER, FOR SUSPENSION ORAL at 15:41

## 2025-05-09 RX ADMIN — DEXTROSE MONOHYDRATE 25 G: 25 INJECTION, SOLUTION INTRAVENOUS at 14:43

## 2025-05-09 RX ADMIN — DEXTROSE AND SODIUM CHLORIDE: 5; .9 INJECTION, SOLUTION INTRAVENOUS at 17:01

## 2025-05-09 RX ADMIN — DEXTROSE 300 ML: 10 SOLUTION INTRAVENOUS at 15:15

## 2025-05-09 RX ADMIN — ALBUTEROL SULFATE 10 MG: 2.5 SOLUTION RESPIRATORY (INHALATION) at 14:29

## 2025-05-09 ASSESSMENT — ACTIVITIES OF DAILY LIVING (ADL)
DOING_ERRANDS_INDEPENDENTLY_DIFFICULTY: NO
ADLS_ACUITY_SCORE: 15
WALKING_OR_CLIMBING_STAIRS_DIFFICULTY: NO
ADLS_ACUITY_SCORE: 15
WEAR_GLASSES_OR_BLIND: NO
CHANGE_IN_FUNCTIONAL_STATUS_SINCE_ONSET_OF_CURRENT_ILLNESS/INJURY: NO
DIFFICULTY_COMMUNICATING: NO
HEARING_DIFFICULTY_OR_DEAF: NO
ADLS_ACUITY_SCORE: 42
ADLS_ACUITY_SCORE: 15
FALL_HISTORY_WITHIN_LAST_SIX_MONTHS: NO
ADLS_ACUITY_SCORE: 15
CONCENTRATING,_REMEMBERING_OR_MAKING_DECISIONS_DIFFICULTY: NO
DRESSING/BATHING_DIFFICULTY: NO
ADLS_ACUITY_SCORE: 42
ADLS_ACUITY_SCORE: 15
DIFFICULTY_EATING/SWALLOWING: NO
TOILETING_ISSUES: NO
ADLS_ACUITY_SCORE: 15
ADLS_ACUITY_SCORE: 15

## 2025-05-09 ASSESSMENT — COLUMBIA-SUICIDE SEVERITY RATING SCALE - C-SSRS
2. HAVE YOU ACTUALLY HAD ANY THOUGHTS OF KILLING YOURSELF IN THE PAST MONTH?: NO
6. HAVE YOU EVER DONE ANYTHING, STARTED TO DO ANYTHING, OR PREPARED TO DO ANYTHING TO END YOUR LIFE?: NO
1. IN THE PAST MONTH, HAVE YOU WISHED YOU WERE DEAD OR WISHED YOU COULD GO TO SLEEP AND NOT WAKE UP?: NO

## 2025-05-09 NOTE — ED PROVIDER NOTES
"[facility]  ED Provider Note    Chief Complaint   Patient presents with    Abnormal Labs     History:  Maxx Canales is a 69 year old male with history of hypertension hypercholesterolemia, thalassemia, kidney stone, recent pyelonephritis who presents to the emergency department today sent in for hyperkalemia.  He was having a workup for preop and these findings were discovered.  He recently finished antibiotics for his pyelonephritis, Bactrim.  Otherwise he states he feels fine and has no complaints    Review of Systems   Performed; see HPI for pertinent positives and negatives.     Medical history, surgical history, and social history was reviewed.  Nursing documentation, triage note, and vitals were reviewed.    Vitals:  BP: 93/55  Pulse: 68  Temp: 96.8  F (36  C)  Resp: 20  Height: 167.6 cm (5' 6\")  SpO2: 96 %    Physical Exam:  Constitutional: Alert and conversant. NAD   HENT: NCAT   Eyes: Normal pupils   Neck: supple   CV: No pallor  Pulmonary/Chest: Non-labored respirations  Abdominal: non-distended soft nontender no rebound no guarding  MSK: LA[place].   Neuro: Alert and appropriate   Skin: Warm and dry. No diaphoresis. No rashes on exposed skin    Psych: Appropriate mood and affect       MDM:      ED Course as of 05/09/25 1649   Fri May 09, 2025   1500 69-year-old male here in the emergency department with concern for hyperkalemia.  Repeat potassium confirms hyperkalemia.  EKG demonstrates subtly peaked T waves but QRS appears normal P waves still present mild toxicity but still warrants treatment   1503 There appears to be significant JASMIN.  He just finished Bactrim and that can certainly cause an acute interstitial nephritis.   1643 It sounds like he also has a history of kidney stones and that he takes potassium pills to help treat that.  The combination of these 2 things likely is driven his potassium up.  Calcium given bicarb albuterol and insulin given.  Lokelma given.  Telemetry " demonstrating improvement after treatment   8621 Case discussed with hospital medicine who has accepted admission       Procedures:  Procedures       Critical Care Addendum  My initial assessment, based on my review of nursing observations, review of vital signs, focused history, physical exam, review of cardiac rhythm monitor, and 12 lead ECG analysis, established a high suspicion that Maxx Canales has a critical arrhythmia, which requires immediate intervention, and therefore he is critically ill.     After the initial assessment, the care team initiated multiple lab tests, initiated IV fluid administration, and initiated medication therapy with above medications/documented elsewhere in epic to provide stabilization care. Due to the critical nature of this patient, I reassessed nursing observations, vital signs, physical exam, and review of cardiac rhythm monitor multiple times prior to his disposition.     Time also spent performing documentation and coordination of care.     Critical care time (excluding teaching time and procedures): 60 minutes.       Impression:  Final diagnoses:   Hyperkalemia            Jay Alcantara MD  05/09/25 [id number]

## 2025-05-09 NOTE — PLAN OF CARE
[facility] Inpatient Admission Note:    Patient admitted to 3222 at approximately 1605 via wheel chair accompanied by spouse from emergency room . Report received from Iva RN in SBAR format at 1550 via telephone. Patient transferred to bed via self.. Patient is alert and oriented X 3, denies pain; rates at 0 on 0-10 scale.  Patient oriented to room, unit, hourly rounding, and plan of care. Explained admission packet and patient handbook with patient bill of rights brochure. Will continue to monitor and document as needed.     Inpatient Nursing criteria listed below was met:    Health care directives status obtained and documented: Yes    Patient identifies a surrogate decision maker: Yes If yes, who:Abbie Canales Contact Information:See facesheet     If initial lactic acid greater than 2.0, repeat lactic acid drawn within one hour of arrival to unit: NA. If no, state reason: NA    Clergy visit ordered if patient requests: N/A    Skin issues/needs documented: Yes    Isolation Patient: no Education given, correct sign in place and documentation row added to PCS:  No    Fall Prevention N/A: Care plan updated, education given and documented, sticker and magnet in place: N/A    Care Plan initiated: Yes    Education Documented (including assessment): Yes    Patient has discharge needs : No If yes, please explain:None identified at this time

## 2025-05-09 NOTE — PLAN OF CARE
Goal Outcome Evaluation:      Plan of Care Reviewed With: patient, spouse    Overall Patient Progress: improving    Pt AOX4. Makes needs known. VS and assessments as charted. Denies pain. Left IV infusing D5 NS @ 200 mL/hr per orders. Right IV SL. Tele on. NPO when initially brought up to floor. Diet advanced to combo regular diet per orders. Ordered dinner, tolerating well. Up IND in room. Urinal voiding. Free of falls during shift. Call light in reach.     Face to face report given with opportunity to observe patient.    Report given to DAREN Flores RN   5/9/2025

## 2025-05-09 NOTE — ED TRIAGE NOTES
Pt is here from walked from clinic [facility] d/t potassium being 7.2, pt denies any CP, SOB, dizziness.
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
## 2025-05-10 VITALS
DIASTOLIC BLOOD PRESSURE: 52 MMHG | OXYGEN SATURATION: 97 % | HEIGHT: 66 IN | TEMPERATURE: 97 F | BODY MASS INDEX: 30.96 KG/M2 | HEART RATE: 63 BPM | RESPIRATION RATE: 20 BRPM | SYSTOLIC BLOOD PRESSURE: 130 MMHG

## 2025-05-10 LAB
ALBUMIN SERPL BCG-MCNC: 3.1 G/DL (ref 3.5–5.2)
ALP SERPL-CCNC: 65 U/L (ref 40–150)
ALT SERPL W P-5'-P-CCNC: 20 U/L (ref 0–70)
ANION GAP SERPL CALCULATED.3IONS-SCNC: 10 MMOL/L (ref 7–15)
AST SERPL W P-5'-P-CCNC: 16 U/L (ref 0–45)
ATRIAL RATE - MUSE: 67 BPM
BILIRUB SERPL-MCNC: 0.2 MG/DL
BUN SERPL-MCNC: 21 MG/DL (ref 8–23)
CALCIUM SERPL-MCNC: 8.5 MG/DL (ref 8.8–10.4)
CHLORIDE SERPL-SCNC: 108 MMOL/L (ref 98–107)
CREAT SERPL-MCNC: 1.37 MG/DL (ref 0.67–1.17)
DIASTOLIC BLOOD PRESSURE - MUSE: NORMAL MMHG
EGFRCR SERPLBLD CKD-EPI 2021: 56 ML/MIN/1.73M2
GLUCOSE SERPL-MCNC: 136 MG/DL (ref 70–99)
HCO3 SERPL-SCNC: 18 MMOL/L (ref 22–29)
HOLD SPECIMEN: NORMAL
INTERPRETATION ECG - MUSE: NORMAL
P AXIS - MUSE: 72 DEGREES
POTASSIUM SERPL-SCNC: 5.1 MMOL/L (ref 3.4–5.3)
PR INTERVAL - MUSE: 224 MS
PROT SERPL-MCNC: 5.9 G/DL (ref 6.4–8.3)
QRS DURATION - MUSE: 92 MS
QT - MUSE: 372 MS
QTC - MUSE: 393 MS
R AXIS - MUSE: 92 DEGREES
SODIUM SERPL-SCNC: 136 MMOL/L (ref 135–145)
SYSTOLIC BLOOD PRESSURE - MUSE: NORMAL MMHG
T AXIS - MUSE: 40 DEGREES
VENTRICULAR RATE- MUSE: 67 BPM

## 2025-05-10 PROCEDURE — 36415 COLL VENOUS BLD VENIPUNCTURE: CPT | Performed by: INTERNAL MEDICINE

## 2025-05-10 PROCEDURE — 258N000003 HC RX IP 258 OP 636: Performed by: INTERNAL MEDICINE

## 2025-05-10 PROCEDURE — 84155 ASSAY OF PROTEIN SERUM: CPT | Performed by: INTERNAL MEDICINE

## 2025-05-10 PROCEDURE — 96376 TX/PRO/DX INJ SAME DRUG ADON: CPT

## 2025-05-10 RX ADMIN — DEXTROSE AND SODIUM CHLORIDE: 5; .9 INJECTION, SOLUTION INTRAVENOUS at 02:54

## 2025-05-10 ASSESSMENT — ACTIVITIES OF DAILY LIVING (ADL)
ADLS_ACUITY_SCORE: 20
ADLS_ACUITY_SCORE: 15
ADLS_ACUITY_SCORE: 20

## 2025-05-10 NOTE — PLAN OF CARE
Goal Outcome Evaluation:      Plan of Care Reviewed With: patient    Overall Patient Progress: improving    Patient discharged at 0820  AM via ambulation accompanied by staff. Prescriptions - None ordered for discharge. All belongings sent with patient.     Discharge instructions reviewed with pt. Discussed medications to be held (lisinopril and potassium) and need to make follow up appointment with PCP next week for labs. Pt verbalized understanding. Listed belongings gathered and returned to patient.     Patient discharged to Home.       [facility]  Follow up appointment made:  Pt will make follow up as they were discharged on weekend  Home medications returned to patient: N/A  Patient reports pain was well managed at discharge: Yes

## 2025-05-10 NOTE — DISCHARGE SUMMARY
"[facility]  Hospitalist Discharge Summary      Date of Admission:  5/9/2025  Date of Discharge:  5/10/2025  Discharging Provider: Harris Barton DO  Discharge Service: Hospitalist Service    Discharge Diagnoses   Hyperkalemia secondary to Bactrim  Acute renal failure secondary to Bactrim  Recent UTI secondary to E. coli treated with Bactrim  Iron deficiency anemia stable  Bilateral inguinal hernias  Nephrolithiasis per history.    Clinically Significant Risk Factors     # Obesity: Estimated body mass index is 30.96 kg/m  as calculated from the following:    Height as of this encounter: 1.676 m (5' 6\").    Weight as of an earlier encounter on 5/9/25: 87 kg (191 lb 12.8 oz).       Follow-ups Needed After Discharge   Follow-up Appointments       Follow-up and recommended labs and tests       Follow-up with PCP next week for preoperative assessment for upcoming bilateral inguinal hernia surgery along with repeat basic metabolic panel to assess renal function and potassium.                Unresulted Labs Ordered in the Past 30 Days of this Admission       No orders found for last 31 day(s).        These results will be followed up by PCP, general surgery and urology.    Discharge Disposition   Discharged to home  Condition at discharge: Good    Hospital Course   Jeet 69-year-old male with a history of nephrolithiasis and diagnosis of bilateral inguinal hernias.  Patient did undergo recent scan due to the inguinal hernias about 10 days ago at which time no stones were identified.  Shortly thereafter he did have some symptoms of UTI and did have it a pansensitive UTI secondary to E. coli.  With this he was placed on Bactrim.  At baseline he does have hypertension and is on lisinopril and has a history of hypokalemia and is also on baseline potassium.  He was seen yesterday in assessment for preoperative evaluation for his inguinal hernia surgery at which time he was noted to have a creatinine of 2.25 " and a potassium of 7.2.  Was on telemetry and did have an EKG showing some slight peaked T waves in the lateral leads otherwise no acute changes.  IV fluids and insulin were given.  Potassium started to trend down to 6.9.  In addition Lokelma was also given and over the night he also received IV fluids.  Time of discharge his potassium is 5.1.  Creatinine is 1.37.  Patient will be discharged with instruction to follow-up with his primary care provider for reassessment.  At that follow-up he should have his blood pressure rechecked.  In addition renal function and potassium should also be rechecked.  However patient was instructed to hold off on any potassium replacement and hold off on lisinopril at time of discharge until he does follow-up.  Bactrim was placed on his allergy list during this hospitalization.  Patient is noted to be anemic consistent with his iron deficiency with an MCV of 6.2 and hemoglobin 9.6.  This should be followed up outpatient.      Consultations This Hospital Stay   None    Code Status   Full Code    Time Spent on this Encounter   I, Harris Barton DO, personally saw the patient today and spent greater than 30 minutes discharging this patient.       Harris Barton DO  [facility]  [address] [place] [id number]  Phone: [phone]  Fax: [phone]  ______________________________________________________________________    Physical Exam   Vital Signs: Temp: 97.4  F (36.3  C) Temp src: Tympanic BP: 129/65 Pulse: 74   Resp: 20 SpO2: 96 % O2 Device: None (Room air)    Weight: 0 lbs 0 oz  Constitutional: awake, alert, cooperative, no apparent distress, and appears stated age  Respiratory: No increased work of breathing, good air exchange, clear to auscultation bilaterally, no crackles or wheezing  Cardiovascular: Normal apical impulse, regular rate and rhythm, normal S1 and S2, no S3 or S4, and no murmur noted  GI: Midline incision well-healed.  Bilateral  inguinal hernias.  Musculoskeletal: no lower extremity pitting edema present  Neurologic: No focal deficits noted.  Neuropsychiatric: General: normal, calm, and normal eye contact  Level of consciousness: alert / normal  Affect: normal  Orientation: oriented to self, place, time and situation       Primary Care Physician   Aleisha Gomez    Discharge Orders      Reason for your hospital stay    Hyperkalemia and acute renal failure secondary to Bactrim use.  Bactrim was listed as allergy.  History of iron deficiency anemia.  History of hypokalemia  History of nephrolithiasis  Hyperlipidemia     Follow-up and recommended labs and tests     Follow-up with PCP next week for preoperative assessment for upcoming bilateral inguinal hernia surgery along with repeat basic metabolic panel to assess renal function and potassium.     Activity    Your activity upon discharge: activity as tolerated     Monitor and record    Patient should follow-up with primary care physician next week for preoperative assessment along with repeat BMP and assessment of blood pressure, potassium and renal function.  Decision at that time as to whether or not to restart potassium and/or lisinopril.  Patient instructed to not take Bactrim moving forward and placed on allergy list.  Lisniopril and potassium held at time of discharge     Full Code     Diet    Follow this diet upon discharge: Current Diet:Orders Placed This Encounter      Combination Diet Regular Diet       Significant Results and Procedures   Most Recent 3 CBC's:  Recent Labs   Lab Test 05/09/25  1623 05/09/25  1237 01/15/25  0812   WBC 6.9 8.9 6.8  6.9   HGB 9.6* 9.9* 11.7*   MCV 62* 62* 66*    473* 190     Most Recent 3 BMP's:  Recent Labs   Lab Test 05/10/25  0539 05/09/25  1621 05/09/25  1520 05/09/25  1420 05/09/25  1237    136  --   --  134*   POTASSIUM 5.1 5.1  --  6.9* 7.2*   CHLORIDE 108* 104  --   --  106   CO2 18* 20*  --   --  20*   BUN 21.0 33.0*  --   --   35.0*   CR 1.37* 1.99*  --   --  2.25*   ANIONGAP 10 12  --   --  8   BABAR 8.5* 9.1  --   --  9.4   * 93 144*  --  95   ,   Results for orders placed or performed during the hospital encounter of 05/09/25   CT Abdomen Pelvis w/o Contrast    Narrative    CT ABDOMEN PELVIS W/O CONTRAST    CLINICAL HISTORY: Male, age 69 years,  new renal failure;    Comparison:  CT urogram 5/1/2025    TECHNIQUE:  CT scanwas performed of the abdomen and pelvis without   contrast. Axial, sagittal and coronal images were reviewed. 3-D MIP  images were reviewed to optimize visualization of the vessels.   This facility minimizes radiation dose by adjusting the mA and/or kV  according to each patient size. This CT scan was performed using one  or more the following dose reduction techniques:   automated exposure control, adjustment of the mA and/or kV according  to patient's size, and/or use of iterative reconstruction technique.    FINDINGS:  The lung bases and visualized portions of the heart demonstrate no  acute abnormality. Dense coronary artery calcifications are similar in  appearance.    Stomach and duodenum: Unremarkable.    Liver: Unremarkable.    Gallbladder: Unremarkable.    Spleen: Unremarkable.    Pancreas: Unremarkable    Adrenal glands: Unremarkable. Kidneys: Bilateral renal lithiasis again  seen without change. There is no evidence of hydronephrosis.    Ureters: Mild distention of the proximal left ureter is similar in  appearance. Metallic coils versus clip in the left hemipelvis  partially obscures the left ureter. The more distal aspects of the  left ureter and the entire right ureter are unremarkable.    Urinary bladder: Unremarkable.    Large and small bowel: Unremarkable. The appendix is unremarkable.    The abdominal aorta and inferior vena cava taper normally. Vascular  calcifications are similar in appearance. Retroperitoneal and  mesenteric lymph nodes are normal in size. Anterior abdominal wall is  is  unchanged. A small fat-containing umbilical hernia is similar in  appearance.     Bony structures: No acute abnormality. Degenerative changes of the  lumbar spine are similar in appearance.     Bilateral inguinal hernias are again seen. A loop of bowel is again  seen within the right inguinal hernia without evidence of acute  complication.      Impression    IMPRESSION:   No evidence of acute abnormality.    Numerous nonacute findings are similar appearance as described above.    LINETTE ZIMMER MD         SYSTEM ID:  [id number]       Discharge Medications   Current Discharge Medication List        CONTINUE these medications which have NOT CHANGED    Details   ASPIRIN PO Take 81 mg by mouth daily      cholecalciferol (VITAMIN D3) 5000 units (125 mcg) capsule Take 125 mcg by mouth daily       Magnesium Citrate 200 MG TABS Take 1 tablet by mouth daily  Qty: 90 tablet, Refills: 3    Associated Diagnoses: Nephrolithiasis      Misc Natural Products (NEURIVA) CAPS       Multiple Vitamins-Minerals (MULTIVITAMIN OR) Take 1 tablet by mouth daily       simvastatin (ZOCOR) 80 MG tablet TAKE 1 TABLET BY MOUTH AT BEDTIME  Qty: 90 tablet, Refills: 0    Associated Diagnoses: Pure hypercholesterolemia           STOP taking these medications       lisinopril (ZESTRIL) 40 MG tablet Comments:   Reason for Stopping:         potassium citrate (UROCIT-K) 10 MEQ (1080 MG) CR tablet Comments:   Reason for Stopping:             Allergies   Allergies   Allergen Reactions    Bactrim [Sulfamethoxazole-Trimethoprim]      Hyperkalemia and ARF    Atorvastatin      Muscle pain

## 2025-05-10 NOTE — PLAN OF CARE
"Goal Outcome Evaluation:    Plan of Care Reviewed With: patient    Overall Patient Progress: improving  Overall Patient Progress: improving    Reason for hospital stay:  Hyperkalemia  Living situation PTA: Home    Patient A+O x4 - calls appropriately and makes needs known. Denied any pain. D5NS infusing at 200 mL/hr to VIRI. RICHIEE IV saline locked. Up independently in room - steady gait. Telemetry reading SD w/ 1st degree AVB 70s per ICU nurse. Lungs with scattered expiratory wheezes. Maintaining sats 95% on RA. Denies any shortness of breath. Rest of VS and assessments as charted in flow sheets.     Potassium down to 5.1 at 4:26 PM. Gave scheduled lokelma after verifying with Dr. Garcia. Potassium this morning remains at 5.1.    Most recent vitals: /65 (BP Location: Left arm, Patient Position: Semi-Bishop's, Cuff Size: Adult Regular)   Pulse 74   Temp 97.4  F (36.3  C) (Tympanic)   Resp 20   Ht 1.676 m (5' 6\")   SpO2 96%   BMI 30.96 kg/m          Face to face report given with opportunity to observe patient.    Report given to Nuha Abbott RN   5/10/2025  7:20 AM        "

## 2025-05-12 ENCOUNTER — LAB (OUTPATIENT)
Dept: LAB | Facility: OTHER | Age: 70
End: 2025-05-12
Attending: STUDENT IN AN ORGANIZED HEALTH CARE EDUCATION/TRAINING PROGRAM
Payer: COMMERCIAL

## 2025-05-12 ENCOUNTER — RESULTS FOLLOW-UP (OUTPATIENT)
Dept: FAMILY MEDICINE | Facility: OTHER | Age: 70
End: 2025-05-12

## 2025-05-12 ENCOUNTER — OFFICE VISIT (OUTPATIENT)
Dept: FAMILY MEDICINE | Facility: OTHER | Age: 70
End: 2025-05-12
Attending: STUDENT IN AN ORGANIZED HEALTH CARE EDUCATION/TRAINING PROGRAM
Payer: MEDICARE

## 2025-05-12 VITALS
TEMPERATURE: 97.3 F | HEIGHT: 66 IN | BODY MASS INDEX: 31.18 KG/M2 | WEIGHT: 194 LBS | DIASTOLIC BLOOD PRESSURE: 58 MMHG | HEART RATE: 85 BPM | SYSTOLIC BLOOD PRESSURE: 120 MMHG | OXYGEN SATURATION: 96 %

## 2025-05-12 DIAGNOSIS — E87.5 HYPERKALEMIA: ICD-10-CM

## 2025-05-12 DIAGNOSIS — E61.1 IRON DEFICIENCY: ICD-10-CM

## 2025-05-12 DIAGNOSIS — I10 BENIGN ESSENTIAL HYPERTENSION: ICD-10-CM

## 2025-05-12 DIAGNOSIS — N17.9 ACUTE RENAL FAILURE, UNSPECIFIED ACUTE RENAL FAILURE TYPE: ICD-10-CM

## 2025-05-12 DIAGNOSIS — K40.20 BILATERAL INGUINAL HERNIA WITHOUT OBSTRUCTION OR GANGRENE, RECURRENCE NOT SPECIFIED: ICD-10-CM

## 2025-05-12 DIAGNOSIS — Z09 HOSPITAL DISCHARGE FOLLOW-UP: Primary | ICD-10-CM

## 2025-05-12 DIAGNOSIS — N20.0 NEPHROLITHIASIS: ICD-10-CM

## 2025-05-12 LAB
ALBUMIN SERPL BCG-MCNC: 3.8 G/DL (ref 3.5–5.2)
ALP SERPL-CCNC: 72 U/L (ref 40–150)
ALT SERPL W P-5'-P-CCNC: 21 U/L (ref 0–70)
ANION GAP SERPL CALCULATED.3IONS-SCNC: 13 MMOL/L (ref 7–15)
AST SERPL W P-5'-P-CCNC: 24 U/L (ref 0–45)
BILIRUB SERPL-MCNC: 0.6 MG/DL
BUN SERPL-MCNC: 12.4 MG/DL (ref 8–23)
CALCIUM SERPL-MCNC: 9.4 MG/DL (ref 8.8–10.4)
CHLORIDE SERPL-SCNC: 107 MMOL/L (ref 98–107)
CREAT SERPL-MCNC: 1.03 MG/DL (ref 0.67–1.17)
EGFRCR SERPLBLD CKD-EPI 2021: 79 ML/MIN/1.73M2
ELLIPTOCYTES BLD QL SMEAR: SLIGHT
ERYTHROCYTE [DISTWIDTH] IN BLOOD BY AUTOMATED COUNT: 15.9 % (ref 10–15)
FERRITIN SERPL-MCNC: 1307 NG/ML (ref 31–409)
GLUCOSE SERPL-MCNC: 93 MG/DL (ref 70–99)
HCO3 SERPL-SCNC: 19 MMOL/L (ref 22–29)
HCT VFR BLD AUTO: 28.6 % (ref 40–53)
HGB BLD-MCNC: 9.3 G/DL (ref 13.3–17.7)
IRON BINDING CAPACITY (ROCHE): 244 UG/DL (ref 240–430)
IRON SATN MFR SERPL: 29 % (ref 15–46)
IRON SERPL-MCNC: 71 UG/DL (ref 61–157)
MCH RBC QN AUTO: 20 PG (ref 26.5–33)
MCHC RBC AUTO-ENTMCNC: 32.5 G/DL (ref 31.5–36.5)
MCV RBC AUTO: 62 FL (ref 78–100)
PLAT MORPH BLD: ABNORMAL
PLATELET # BLD AUTO: 337 10E3/UL (ref 150–450)
POTASSIUM SERPL-SCNC: 4.5 MMOL/L (ref 3.4–5.3)
PROT SERPL-MCNC: 7.1 G/DL (ref 6.4–8.3)
RBC # BLD AUTO: 4.64 10E6/UL (ref 4.4–5.9)
RBC MORPH BLD: ABNORMAL
SODIUM SERPL-SCNC: 139 MMOL/L (ref 135–145)
TARGETS BLD QL SMEAR: ABNORMAL
WBC # BLD AUTO: 7.2 10E3/UL (ref 4–11)

## 2025-05-12 PROCEDURE — 83550 IRON BINDING TEST: CPT | Mod: ZL

## 2025-05-12 PROCEDURE — G0463 HOSPITAL OUTPT CLINIC VISIT: HCPCS

## 2025-05-12 PROCEDURE — 84155 ASSAY OF PROTEIN SERUM: CPT | Mod: ZL

## 2025-05-12 PROCEDURE — 85027 COMPLETE CBC AUTOMATED: CPT | Mod: ZL

## 2025-05-12 PROCEDURE — 36415 COLL VENOUS BLD VENIPUNCTURE: CPT | Mod: ZL

## 2025-05-12 PROCEDURE — 82728 ASSAY OF FERRITIN: CPT | Mod: ZL

## 2025-05-12 ASSESSMENT — PAIN SCALES - GENERAL: PAINLEVEL_OUTOF10: NO PAIN (0)

## 2025-05-12 NOTE — PROGRESS NOTES
Assessment & Plan     Hospital discharge follow-up  Much improved. Multiple items discussed as below.    Nephrolithiasis  Improved. Discussed continuing with Potassium given history of uric acid stones. Will repeat labs in one week to assess potassium.     Bilateral inguinal hernia without obstruction or gangrene, recurrence not specified  Has upcoming surgery. Has had some inguinal pain with coughing. Discussed importance of stabilizing labs. Will discuss case with Hematology and surgeon.     Benign essential hypertension  Will restart Lisinopril. Cr is back to baseline.   - Comprehensive metabolic panel (BMP + Alb, Alk Phos, ALT, AST, Total. Bili, TP); Future  - CBC with platelets; Future    Hyperkalemia  Greatly improved. Discussed restarting potassium tablets BID as recommended by urology. Will repeat labs in one week and if potassium increases will reduce potassium supplementation.   - Comprehensive metabolic panel (BMP + Alb, Alk Phos, ALT, AST, Total. Bili, TP); Future    Iron deficiency  Following with Hematology. Ferritin is improved on retesting levels today. Iron is stable. If Hgb is normal, will be able to approve upcoming surgery.   - Iron and iron binding capacity; Future  - Ferritin; Future  - CBC with platelets; Future    Acute renal failure, unspecified acute renal failure type  Resolved.   - Comprehensive metabolic panel (BMP + Alb, Alk Phos, ALT, AST, Total. Bili, TP); Future        MED REC REQUIRED  Post Medication Reconciliation Status: discharge medications reconciled and changed, per note/orders        Subjective   Jeet is a 69 year old, presenting for the following health issues:  No chief complaint on file.        5/12/2025     1:16 PM   Additional Questions   Roomed by Lucretia CUBA     HPI      Doing well. No concerns. Fatigue has improved. Denies any chest pain, shortness of breath, abdominal pain, nausea, vomiting or diarrhea. No recent fevers.   Hospital Follow-up  "Visit:    Hospital/Nursing Home/IP Rehab Facility: [facility]  Most Recent Admission Date: 5/9/2025   Most Recent Admission Diagnosis: Hyperkalemia - E87.5     Most Recent Discharge Date: 5/10/2025   Most Recent Discharge Diagnosis: Hyperkalemia - E87.5   Was the patient in the ICU or did the patient experience delirium during hospitalization?  No  Do you have any other stressors you would like to discuss with your provider? No    Problems taking medications regularly:  None  Medication changes since discharge: stopped lisinopril, potassium and bactrim  Problems adhering to non-medication therapy:  None    Summary of hospitalization:  [facility] discharge summary reviewed  Diagnostic Tests/Treatments reviewed.  Follow up needed: CMP, CBC, Iron and Ferritin  Other Healthcare Providers Involved in Patient s Care:         None  Update since discharge: improved.         Plan of care communicated with patient               Review of Systems  Constitutional, HEENT, cardiovascular, pulmonary, GI, , musculoskeletal, neuro, skin, endocrine and psych systems are negative, except as otherwise noted.      Objective    /58 (BP Location: Left arm, Patient Position: Sitting, Cuff Size: Adult Large)   Pulse 85   Temp 97.3  F (36.3  C) (Tympanic)   Ht 1.676 m (5' 6\")   Wt 88 kg (194 lb)   SpO2 96%   BMI 31.31 kg/m    Body mass index is 31.31 kg/m .  Physical Exam   GENERAL: alert and no distress  EYES: Eyes grossly normal to inspection,   RESP: lungs clear to auscultation - no rales, rhonchi or wheezes  CV: regular rate and rhythm, normal S1 S2, no S3 or S4, no murmur, click or rub, no peripheral edema  ABDOMEN: soft, nontender, no masses and bowel sounds normal  MS: no gross musculoskeletal defects noted, no edema  PSYCH: mentation appears normal, affect normal/bright    Results for orders placed or performed in visit on 05/12/25 (from the past 24 hours)   Comprehensive metabolic panel " (BMP + Alb, Alk Phos, ALT, AST, Total. Bili, TP)   Result Value Ref Range    Sodium 139 135 - 145 mmol/L    Potassium 4.5 3.4 - 5.3 mmol/L    Carbon Dioxide (CO2) 19 (L) 22 - 29 mmol/L    Anion Gap 13 7 - 15 mmol/L    Urea Nitrogen 12.4 8.0 - 23.0 mg/dL    Creatinine 1.03 0.67 - 1.17 mg/dL    GFR Estimate 79 >60 mL/min/1.73m2    Calcium 9.4 8.8 - 10.4 mg/dL    Chloride 107 98 - 107 mmol/L    Glucose 93 70 - 99 mg/dL    Alkaline Phosphatase 72 40 - 150 U/L    AST 24 0 - 45 U/L    ALT 21 0 - 70 U/L    Protein Total 7.1 6.4 - 8.3 g/dL    Albumin 3.8 3.5 - 5.2 g/dL    Bilirubin Total 0.6 <=1.2 mg/dL   Ferritin   Result Value Ref Range    Ferritin 1,307 (H) 31 - 409 ng/mL   Iron and iron binding capacity   Result Value Ref Range    Iron 71 61 - 157 ug/dL    Iron Binding Capacity 244 240 - 430 ug/dL    Iron Sat Index 29 15 - 46 %   CBC with platelets   Result Value Ref Range    WBC Count 7.2 4.0 - 11.0 10e3/uL    RBC Count 4.64 4.40 - 5.90 10e6/uL    Hemoglobin 9.3 (L) 13.3 - 17.7 g/dL    Hematocrit 28.6 (L) 40.0 - 53.0 %    MCV 62 (L) 78 - 100 fL    MCH 20.0 (L) 26.5 - 33.0 pg    MCHC 32.5 31.5 - 36.5 g/dL    RDW 15.9 (H) 10.0 - 15.0 %    Platelet Count 337 150 - 450 10e3/uL   RBC and Platelet Morphology   Result Value Ref Range    RBC Morphology Confirmed RBC Indices     Platelet Assessment  Automated Count Confirmed. Platelet morphology is normal.     Automated Count Confirmed. Platelet morphology is normal.    Elliptocytes Slight (A) None Seen    Target Cells Moderate (A) None Seen         30 minutes spent by me on the date of the encounter doing chart review, history and exam, documentation and further activities per the note.    Signed Electronically by: Libby Vanegas PA-C

## 2025-05-13 RX ORDER — POTASSIUM CITRATE 1080 MG/1
10 TABLET, EXTENDED RELEASE ORAL 2 TIMES DAILY
COMMUNITY

## 2025-05-13 RX ORDER — LISINOPRIL 40 MG/1
40 TABLET ORAL DAILY
COMMUNITY

## 2025-05-19 ENCOUNTER — TELEPHONE (OUTPATIENT)
Dept: CARE COORDINATION | Facility: OTHER | Age: 70
End: 2025-05-19

## 2025-05-19 ENCOUNTER — LAB (OUTPATIENT)
Dept: LAB | Facility: OTHER | Age: 70
End: 2025-05-19
Payer: MEDICARE

## 2025-05-19 ENCOUNTER — RESULTS FOLLOW-UP (OUTPATIENT)
Dept: FAMILY MEDICINE | Facility: OTHER | Age: 70
End: 2025-05-19

## 2025-05-19 DIAGNOSIS — E83.19 IRON EXCESS: ICD-10-CM

## 2025-05-19 DIAGNOSIS — E87.5 HYPERKALEMIA: Primary | ICD-10-CM

## 2025-05-19 DIAGNOSIS — E87.5 HYPERKALEMIA: ICD-10-CM

## 2025-05-19 LAB
ALBUMIN SERPL BCG-MCNC: 4.2 G/DL (ref 3.5–5.2)
ALP SERPL-CCNC: 80 U/L (ref 40–150)
ALT SERPL W P-5'-P-CCNC: 17 U/L (ref 0–70)
ANION GAP SERPL CALCULATED.3IONS-SCNC: 13 MMOL/L (ref 7–15)
AST SERPL W P-5'-P-CCNC: 22 U/L (ref 0–45)
BASOPHILS # BLD AUTO: 0.1 10E3/UL (ref 0–0.2)
BASOPHILS NFR BLD AUTO: 1 %
BILIRUB SERPL-MCNC: 0.8 MG/DL
BUN SERPL-MCNC: 15.5 MG/DL (ref 8–23)
CALCIUM SERPL-MCNC: 9.6 MG/DL (ref 8.8–10.4)
CHLORIDE SERPL-SCNC: 103 MMOL/L (ref 98–107)
CREAT SERPL-MCNC: 0.96 MG/DL (ref 0.67–1.17)
EGFRCR SERPLBLD CKD-EPI 2021: 86 ML/MIN/1.73M2
EOSINOPHIL # BLD AUTO: 0.1 10E3/UL (ref 0–0.7)
EOSINOPHIL NFR BLD AUTO: 1 %
ERYTHROCYTE [DISTWIDTH] IN BLOOD BY AUTOMATED COUNT: 18.6 % (ref 10–15)
FERRITIN SERPL-MCNC: 1196 NG/ML (ref 31–409)
GLUCOSE SERPL-MCNC: 99 MG/DL (ref 70–99)
HCO3 SERPL-SCNC: 21 MMOL/L (ref 22–29)
HCT VFR BLD AUTO: 33.8 % (ref 40–53)
HGB BLD-MCNC: 10.8 G/DL (ref 13.3–17.7)
IMM GRANULOCYTES # BLD: 0 10E3/UL
IMM GRANULOCYTES NFR BLD: 1 %
IRON BINDING CAPACITY (ROCHE): 259 UG/DL (ref 240–430)
IRON SATN MFR SERPL: 17 % (ref 15–46)
IRON SERPL-MCNC: 43 UG/DL (ref 61–157)
LYMPHOCYTES # BLD AUTO: 2.6 10E3/UL (ref 0.8–5.3)
LYMPHOCYTES NFR BLD AUTO: 32 %
MCH RBC QN AUTO: 20 PG (ref 26.5–33)
MCHC RBC AUTO-ENTMCNC: 32 G/DL (ref 31.5–36.5)
MCV RBC AUTO: 63 FL (ref 78–100)
MONOCYTES # BLD AUTO: 0.9 10E3/UL (ref 0–1.3)
MONOCYTES NFR BLD AUTO: 11 %
NEUTROPHILS # BLD AUTO: 4.5 10E3/UL (ref 1.6–8.3)
NEUTROPHILS NFR BLD AUTO: 55 %
NRBC # BLD AUTO: 0 10E3/UL
NRBC BLD AUTO-RTO: 0 /100
PLAT MORPH BLD: NORMAL
PLATELET # BLD AUTO: 252 10E3/UL (ref 150–450)
POTASSIUM SERPL-SCNC: 4.2 MMOL/L (ref 3.4–5.3)
PROT SERPL-MCNC: 7.8 G/DL (ref 6.4–8.3)
RBC # BLD AUTO: 5.41 10E6/UL (ref 4.4–5.9)
RBC MORPH BLD: NORMAL
SODIUM SERPL-SCNC: 137 MMOL/L (ref 135–145)
WBC # BLD AUTO: 8.2 10E3/UL (ref 4–11)

## 2025-05-19 PROCEDURE — 36415 COLL VENOUS BLD VENIPUNCTURE: CPT | Mod: ZL

## 2025-05-19 PROCEDURE — 85025 COMPLETE CBC W/AUTO DIFF WBC: CPT | Mod: ZL

## 2025-05-19 PROCEDURE — 83550 IRON BINDING TEST: CPT | Mod: ZL

## 2025-05-19 PROCEDURE — 82728 ASSAY OF FERRITIN: CPT | Mod: ZL

## 2025-05-19 PROCEDURE — 82040 ASSAY OF SERUM ALBUMIN: CPT | Mod: ZL

## 2025-05-19 NOTE — TELEPHONE ENCOUNTER
Patient presented to clinic for repeat lab work.   Upon chart  review orders not placed.  RN CC discussed in person with Libby Vanegas and pended CMP per request of provider.

## 2025-05-20 ENCOUNTER — ONCOLOGY VISIT (OUTPATIENT)
Dept: ONCOLOGY | Facility: OTHER | Age: 70
End: 2025-05-20
Attending: INTERNAL MEDICINE
Payer: MEDICARE

## 2025-05-20 VITALS
HEIGHT: 66 IN | TEMPERATURE: 97.5 F | SYSTOLIC BLOOD PRESSURE: 109 MMHG | DIASTOLIC BLOOD PRESSURE: 68 MMHG | BODY MASS INDEX: 30.61 KG/M2 | WEIGHT: 190.48 LBS | OXYGEN SATURATION: 97 % | HEART RATE: 75 BPM

## 2025-05-20 DIAGNOSIS — R79.89 ELEVATED FERRITIN: Primary | ICD-10-CM

## 2025-05-20 ASSESSMENT — PAIN SCALES - GENERAL: PAINLEVEL_OUTOF10: NO PAIN (0)
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
## 2025-05-20 NOTE — NURSING NOTE
"Oncology Rooming Note    May 20, 2025 9:27 AM   Maxx Canales is a 69 year old male who presents for:    Chief Complaint   Patient presents with    Hematology     Initial Vitals: /68   Pulse 75   Temp 97.5  F (36.4  C) (Tympanic)   Ht 1.676 m (5' 6\")   Wt 86.4 kg (190 lb 7.6 oz)   SpO2 97%   BMI 30.74 kg/m   Estimated body mass index is 30.74 kg/m  as calculated from the following:    Height as of this encounter: 1.676 m (5' 6\").    Weight as of this encounter: 86.4 kg (190 lb 7.6 oz). Body surface area is 2.01 meters squared.  No Pain (0) Comment: Data Unavailable   No LMP for male patient.  Allergies reviewed: Yes  Medications reviewed: Yes    Medications: Medication refills not needed today.  Pharmacy name entered into [employer]:    [facility] [address] [facility], [place] - [address] [address] [facility] [address]  [facility] - [place], [place] - [address]    Frailty Screening:   Is the patient here for a new oncology consult visit in cancer care? 2. No    PHQ9:  Did this patient require a PHQ9?: No          Vidhi Savage LPN              "

## 2025-05-21 ENCOUNTER — OFFICE VISIT (OUTPATIENT)
Dept: FAMILY MEDICINE | Facility: OTHER | Age: 70
End: 2025-05-21
Attending: STUDENT IN AN ORGANIZED HEALTH CARE EDUCATION/TRAINING PROGRAM
Payer: MEDICARE

## 2025-05-21 ENCOUNTER — ANCILLARY PROCEDURE (OUTPATIENT)
Dept: GENERAL RADIOLOGY | Facility: OTHER | Age: 70
End: 2025-05-21
Attending: STUDENT IN AN ORGANIZED HEALTH CARE EDUCATION/TRAINING PROGRAM
Payer: MEDICARE

## 2025-05-21 ENCOUNTER — APPOINTMENT (OUTPATIENT)
Dept: LAB | Facility: OTHER | Age: 70
End: 2025-05-21
Payer: MEDICARE

## 2025-05-21 VITALS
BODY MASS INDEX: 30.31 KG/M2 | OXYGEN SATURATION: 98 % | TEMPERATURE: 97.2 F | HEIGHT: 66 IN | WEIGHT: 188.6 LBS | HEART RATE: 74 BPM | DIASTOLIC BLOOD PRESSURE: 66 MMHG | RESPIRATION RATE: 18 BRPM | SYSTOLIC BLOOD PRESSURE: 110 MMHG

## 2025-05-21 DIAGNOSIS — Z01.818 PREOP GENERAL PHYSICAL EXAM: Primary | ICD-10-CM

## 2025-05-21 DIAGNOSIS — E87.5 HYPERKALEMIA: ICD-10-CM

## 2025-05-21 DIAGNOSIS — D56.9 THALASSEMIA, UNSPECIFIED TYPE: ICD-10-CM

## 2025-05-21 DIAGNOSIS — K40.20 BILATERAL INGUINAL HERNIA WITHOUT OBSTRUCTION OR GANGRENE, RECURRENCE NOT SPECIFIED: ICD-10-CM

## 2025-05-21 DIAGNOSIS — I10 BENIGN ESSENTIAL HYPERTENSION: ICD-10-CM

## 2025-05-21 DIAGNOSIS — E78.00 PURE HYPERCHOLESTEROLEMIA: ICD-10-CM

## 2025-05-21 DIAGNOSIS — R79.89 ELEVATED FERRITIN: ICD-10-CM

## 2025-05-21 DIAGNOSIS — R05.1 ACUTE COUGH: ICD-10-CM

## 2025-05-21 DIAGNOSIS — N17.9 ACUTE RENAL FAILURE, UNSPECIFIED ACUTE RENAL FAILURE TYPE: ICD-10-CM

## 2025-05-21 DIAGNOSIS — H72.92 PERFORATED TYMPANIC MEMBRANE ON EXAMINATION, LEFT: ICD-10-CM

## 2025-05-21 DIAGNOSIS — E61.1 IRON DEFICIENCY: ICD-10-CM

## 2025-05-21 PROCEDURE — 71046 X-RAY EXAM CHEST 2 VIEWS: CPT | Mod: TC

## 2025-05-21 PROCEDURE — 71046 X-RAY EXAM CHEST 2 VIEWS: CPT | Mod: 26 | Performed by: RADIOLOGY

## 2025-05-21 PROCEDURE — G0463 HOSPITAL OUTPT CLINIC VISIT: HCPCS | Mod: 25

## 2025-05-21 RX ORDER — ALBUTEROL SULFATE 90 UG/1
2 INHALANT RESPIRATORY (INHALATION) EVERY 6 HOURS PRN
Qty: 18 G | Refills: 0 | Status: SHIPPED | OUTPATIENT
Start: 2025-05-21

## 2025-05-21 ASSESSMENT — PAIN SCALES - GENERAL: PAINLEVEL_OUTOF10: NO PAIN (0)
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
## 2025-05-22 ENCOUNTER — TELEPHONE (OUTPATIENT)
Dept: SURGERY | Facility: OTHER | Age: 70
End: 2025-05-22

## 2025-05-22 LAB
ATRIAL RATE - MUSE: 70 BPM
DIASTOLIC BLOOD PRESSURE - MUSE: NORMAL MMHG
INTERPRETATION ECG - MUSE: NORMAL
P AXIS - MUSE: 48 DEGREES
PR INTERVAL - MUSE: 204 MS
QRS DURATION - MUSE: 80 MS
QT - MUSE: 388 MS
QTC - MUSE: 419 MS
R AXIS - MUSE: 75 DEGREES
SYSTOLIC BLOOD PRESSURE - MUSE: NORMAL MMHG
T AXIS - MUSE: 33 DEGREES
VENTRICULAR RATE- MUSE: 70 BPM

## 2025-05-22 NOTE — TELEPHONE ENCOUNTER
----- Message from Rene Andrade sent at 5/22/2025  8:38 AM CDT -----  Regarding: Respiratory Symptoms prior to Bilateral Inguinal Hernia Repair  Okay sounds good.  I have cc'd the Anesthetists so that they are aware in case they would like any additional workup.    Jan  ----- Message -----  From: Libby Vanegas PA-C  Sent: 5/21/2025  10:34 AM CDT  To: Rene Andrade MD    Hi Dr. Andrade,     I will be sending you my note shortly, but I did want to let you know that Jeet had wheezing that did not clear with coughing on exam today. He has had an intermittent cough for the past couple of weeks but is otherwise asx.  I am getting a CXR and will be giving him an inhaler with the plan to recheck his lung sounds on Friday. He did start taking his claritin in the last couple days and suspects he has allergies.      Just wanted to let you know,       Libby

## 2025-05-27 ENCOUNTER — ANESTHESIA EVENT (OUTPATIENT)
Dept: SURGERY | Facility: HOSPITAL | Age: 70
End: 2025-05-27
Payer: MEDICARE

## 2025-05-27 ASSESSMENT — LIFESTYLE VARIABLES: TOBACCO_USE: 1

## 2025-05-27 ASSESSMENT — ENCOUNTER SYMPTOMS: DYSRHYTHMIAS: 1
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
## 2025-05-29 ENCOUNTER — APPOINTMENT (OUTPATIENT)
Dept: ULTRASOUND IMAGING | Facility: HOSPITAL | Age: 70
End: 2025-05-29
Attending: NURSE ANESTHETIST, CERTIFIED REGISTERED
Payer: MEDICARE

## 2025-05-29 ENCOUNTER — ANESTHESIA (OUTPATIENT)
Dept: SURGERY | Facility: HOSPITAL | Age: 70
End: 2025-05-29
Payer: MEDICARE

## 2025-05-29 ENCOUNTER — HOSPITAL ENCOUNTER (OUTPATIENT)
Facility: HOSPITAL | Age: 70
Discharge: HOME | End: 2025-05-29
Attending: SURGERY | Admitting: SURGERY
Payer: MEDICARE

## 2025-05-29 VITALS
HEIGHT: 66 IN | HEART RATE: 65 BPM | RESPIRATION RATE: 16 BRPM | TEMPERATURE: 97.5 F | SYSTOLIC BLOOD PRESSURE: 130 MMHG | BODY MASS INDEX: 30.79 KG/M2 | DIASTOLIC BLOOD PRESSURE: 70 MMHG | OXYGEN SATURATION: 100 % | WEIGHT: 191.6 LBS

## 2025-05-29 DIAGNOSIS — K40.20 NON-RECURRENT BILATERAL INGUINAL HERNIA WITHOUT OBSTRUCTION OR GANGRENE: Primary | ICD-10-CM

## 2025-05-29 PROCEDURE — 94640 AIRWAY INHALATION TREATMENT: CPT

## 2025-05-29 PROCEDURE — C1781 MESH (IMPLANTABLE): HCPCS | Performed by: SURGERY

## 2025-05-29 PROCEDURE — 258N000003 HC RX IP 258 OP 636: Performed by: NURSE ANESTHETIST, CERTIFIED REGISTERED

## 2025-05-29 PROCEDURE — 999N000141 HC STATISTIC PRE-PROCEDURE NURSING ASSESSMENT: Performed by: SURGERY

## 2025-05-29 PROCEDURE — 250N000009 HC RX 250: Performed by: NURSE ANESTHETIST, CERTIFIED REGISTERED

## 2025-05-29 PROCEDURE — 49505 PRP I/HERN INIT REDUC >5 YR: CPT | Mod: 50 | Performed by: SURGERY

## 2025-05-29 PROCEDURE — 999N000157 HC STATISTIC RCP TIME EA 10 MIN

## 2025-05-29 PROCEDURE — 272N000001 HC OR GENERAL SUPPLY STERILE: Performed by: SURGERY

## 2025-05-29 PROCEDURE — 250N000025 HC SEVOFLURANE, PER MIN: Performed by: SURGERY

## 2025-05-29 PROCEDURE — 710N000012 HC RECOVERY PHASE 2, PER MINUTE: Performed by: SURGERY

## 2025-05-29 PROCEDURE — 360N000075 HC SURGERY LEVEL 2, PER MIN: Performed by: SURGERY

## 2025-05-29 PROCEDURE — 250N000011 HC RX IP 250 OP 636: Mod: JZ | Performed by: NURSE ANESTHETIST, CERTIFIED REGISTERED

## 2025-05-29 PROCEDURE — 710N000010 HC RECOVERY PHASE 1, LEVEL 2, PER MIN: Performed by: SURGERY

## 2025-05-29 PROCEDURE — 250N000011 HC RX IP 250 OP 636: Performed by: SURGERY

## 2025-05-29 PROCEDURE — 370N000017 HC ANESTHESIA TECHNICAL FEE, PER MIN: Performed by: SURGERY

## 2025-05-29 DEVICE — MACROPOROUS MESH, MONOFILAMENT POLYPROPYLENE
Type: IMPLANTABLE DEVICE | Site: GROIN | Status: FUNCTIONAL
Brand: PARIETENE

## 2025-05-29 RX ORDER — OXYCODONE HYDROCHLORIDE 5 MG/1
5-10 TABLET ORAL EVERY 4 HOURS PRN
Qty: 10 TABLET | Refills: 0 | Status: SHIPPED | OUTPATIENT
Start: 2025-05-29

## 2025-05-29 RX ORDER — SODIUM CHLORIDE, SODIUM LACTATE, POTASSIUM CHLORIDE, CALCIUM CHLORIDE 600; 310; 30; 20 MG/100ML; MG/100ML; MG/100ML; MG/100ML
INJECTION, SOLUTION INTRAVENOUS CONTINUOUS
Status: DISCONTINUED | OUTPATIENT
Start: 2025-05-29 | End: 2025-05-29 | Stop reason: HOSPADM

## 2025-05-29 RX ORDER — HYDROMORPHONE HYDROCHLORIDE 1 MG/ML
0.2 INJECTION, SOLUTION INTRAMUSCULAR; INTRAVENOUS; SUBCUTANEOUS EVERY 5 MIN PRN
Status: DISCONTINUED | OUTPATIENT
Start: 2025-05-29 | End: 2025-05-29 | Stop reason: HOSPADM

## 2025-05-29 RX ORDER — IPRATROPIUM BROMIDE AND ALBUTEROL SULFATE 2.5; .5 MG/3ML; MG/3ML
3 SOLUTION RESPIRATORY (INHALATION) ONCE
Status: COMPLETED | OUTPATIENT
Start: 2025-05-29 | End: 2025-05-29

## 2025-05-29 RX ORDER — DEXAMETHASONE SODIUM PHOSPHATE 4 MG/ML
INJECTION, SOLUTION INTRA-ARTICULAR; INTRALESIONAL; INTRAMUSCULAR; INTRAVENOUS; SOFT TISSUE PRN
Status: DISCONTINUED | OUTPATIENT
Start: 2025-05-29 | End: 2025-05-29

## 2025-05-29 RX ORDER — LABETALOL HYDROCHLORIDE 5 MG/ML
10 INJECTION, SOLUTION INTRAVENOUS
Status: DISCONTINUED | OUTPATIENT
Start: 2025-05-29 | End: 2025-05-29 | Stop reason: HOSPADM

## 2025-05-29 RX ORDER — ALBUTEROL SULFATE 0.83 MG/ML
2.5 SOLUTION RESPIRATORY (INHALATION) EVERY 4 HOURS PRN
Status: DISCONTINUED | OUTPATIENT
Start: 2025-05-29 | End: 2025-05-29 | Stop reason: HOSPADM

## 2025-05-29 RX ORDER — OXYCODONE HYDROCHLORIDE 5 MG/1
10 TABLET ORAL
Status: DISCONTINUED | OUTPATIENT
Start: 2025-05-29 | End: 2025-05-29 | Stop reason: HOSPADM

## 2025-05-29 RX ORDER — FENTANYL CITRATE 50 UG/ML
50 INJECTION, SOLUTION INTRAMUSCULAR; INTRAVENOUS EVERY 5 MIN PRN
Status: DISCONTINUED | OUTPATIENT
Start: 2025-05-29 | End: 2025-05-29 | Stop reason: HOSPADM

## 2025-05-29 RX ORDER — ONDANSETRON 2 MG/ML
4 INJECTION INTRAMUSCULAR; INTRAVENOUS EVERY 30 MIN PRN
Status: DISCONTINUED | OUTPATIENT
Start: 2025-05-29 | End: 2025-05-29 | Stop reason: HOSPADM

## 2025-05-29 RX ORDER — PROPOFOL 10 MG/ML
INJECTION, EMULSION INTRAVENOUS PRN
Status: DISCONTINUED | OUTPATIENT
Start: 2025-05-29 | End: 2025-05-29

## 2025-05-29 RX ORDER — HYDRALAZINE HYDROCHLORIDE 20 MG/ML
2.5-5 INJECTION INTRAMUSCULAR; INTRAVENOUS EVERY 10 MIN PRN
Status: DISCONTINUED | OUTPATIENT
Start: 2025-05-29 | End: 2025-05-29 | Stop reason: HOSPADM

## 2025-05-29 RX ORDER — FENTANYL CITRATE 50 UG/ML
INJECTION, SOLUTION INTRAMUSCULAR; INTRAVENOUS PRN
Status: DISCONTINUED | OUTPATIENT
Start: 2025-05-29 | End: 2025-05-29

## 2025-05-29 RX ORDER — HYDROMORPHONE HYDROCHLORIDE 1 MG/ML
0.4 INJECTION, SOLUTION INTRAMUSCULAR; INTRAVENOUS; SUBCUTANEOUS EVERY 5 MIN PRN
Status: DISCONTINUED | OUTPATIENT
Start: 2025-05-29 | End: 2025-05-29 | Stop reason: HOSPADM

## 2025-05-29 RX ORDER — DEXAMETHASONE SODIUM PHOSPHATE 10 MG/ML
4 INJECTION, SOLUTION INTRAMUSCULAR; INTRAVENOUS
Status: DISCONTINUED | OUTPATIENT
Start: 2025-05-29 | End: 2025-05-29 | Stop reason: HOSPADM

## 2025-05-29 RX ORDER — DEXAMETHASONE SODIUM PHOSPHATE 4 MG/ML
4 INJECTION, SOLUTION INTRA-ARTICULAR; INTRALESIONAL; INTRAMUSCULAR; INTRAVENOUS; SOFT TISSUE
Status: DISCONTINUED | OUTPATIENT
Start: 2025-05-29 | End: 2025-05-29 | Stop reason: HOSPADM

## 2025-05-29 RX ORDER — FENTANYL CITRATE 50 UG/ML
25 INJECTION, SOLUTION INTRAMUSCULAR; INTRAVENOUS EVERY 5 MIN PRN
Status: DISCONTINUED | OUTPATIENT
Start: 2025-05-29 | End: 2025-05-29 | Stop reason: HOSPADM

## 2025-05-29 RX ORDER — BUPIVACAINE HYDROCHLORIDE 2.5 MG/ML
INJECTION, SOLUTION EPIDURAL; INFILTRATION; INTRACAUDAL; PERINEURAL
Status: COMPLETED | OUTPATIENT
Start: 2025-05-29 | End: 2025-05-29

## 2025-05-29 RX ORDER — CEFAZOLIN SODIUM/WATER 2 G/20 ML
2 SYRINGE (ML) INTRAVENOUS
Status: COMPLETED | OUTPATIENT
Start: 2025-05-29 | End: 2025-05-29

## 2025-05-29 RX ORDER — BUPIVACAINE HYDROCHLORIDE 5 MG/ML
INJECTION, SOLUTION PERINEURAL PRN
Status: DISCONTINUED | OUTPATIENT
Start: 2025-05-29 | End: 2025-05-29 | Stop reason: HOSPADM

## 2025-05-29 RX ORDER — ONDANSETRON 4 MG/1
4 TABLET, ORALLY DISINTEGRATING ORAL EVERY 30 MIN PRN
Status: DISCONTINUED | OUTPATIENT
Start: 2025-05-29 | End: 2025-05-29 | Stop reason: HOSPADM

## 2025-05-29 RX ORDER — NALOXONE HYDROCHLORIDE 0.4 MG/ML
0.1 INJECTION, SOLUTION INTRAMUSCULAR; INTRAVENOUS; SUBCUTANEOUS
Status: DISCONTINUED | OUTPATIENT
Start: 2025-05-29 | End: 2025-05-29 | Stop reason: HOSPADM

## 2025-05-29 RX ORDER — KETAMINE HYDROCHLORIDE 10 MG/ML
INJECTION INTRAMUSCULAR; INTRAVENOUS PRN
Status: DISCONTINUED | OUTPATIENT
Start: 2025-05-29 | End: 2025-05-29

## 2025-05-29 RX ORDER — ONDANSETRON 2 MG/ML
INJECTION INTRAMUSCULAR; INTRAVENOUS PRN
Status: DISCONTINUED | OUTPATIENT
Start: 2025-05-29 | End: 2025-05-29

## 2025-05-29 RX ORDER — LIDOCAINE HYDROCHLORIDE 20 MG/ML
INJECTION, SOLUTION INFILTRATION; PERINEURAL PRN
Status: DISCONTINUED | OUTPATIENT
Start: 2025-05-29 | End: 2025-05-29

## 2025-05-29 RX ORDER — ACETAMINOPHEN 325 MG/1
650 TABLET ORAL
Status: DISCONTINUED | OUTPATIENT
Start: 2025-05-29 | End: 2025-05-29 | Stop reason: HOSPADM

## 2025-05-29 RX ORDER — CEFAZOLIN SODIUM/WATER 2 G/20 ML
2 SYRINGE (ML) INTRAVENOUS SEE ADMIN INSTRUCTIONS
Status: DISCONTINUED | OUTPATIENT
Start: 2025-05-29 | End: 2025-05-29 | Stop reason: HOSPADM

## 2025-05-29 RX ORDER — LIDOCAINE 40 MG/G
CREAM TOPICAL
Status: DISCONTINUED | OUTPATIENT
Start: 2025-05-29 | End: 2025-05-29 | Stop reason: HOSPADM

## 2025-05-29 RX ADMIN — MIDAZOLAM 2 MG: 1 INJECTION INTRAMUSCULAR; INTRAVENOUS at 07:38

## 2025-05-29 RX ADMIN — BUPIVACAINE 10 ML: 13.3 INJECTION, SUSPENSION, LIPOSOMAL INFILTRATION at 07:49

## 2025-05-29 RX ADMIN — BUPIVACAINE 10 ML: 13.3 INJECTION, SUSPENSION, LIPOSOMAL INFILTRATION at 07:42

## 2025-05-29 RX ADMIN — PHENYLEPHRINE HYDROCHLORIDE 200 MCG: 10 INJECTION INTRAVENOUS at 07:55

## 2025-05-29 RX ADMIN — Medication 10 MG: at 09:34

## 2025-05-29 RX ADMIN — DEXMEDETOMIDINE HYDROCHLORIDE 8 MCG: 100 INJECTION, SOLUTION INTRAVENOUS at 09:34

## 2025-05-29 RX ADMIN — PHENYLEPHRINE HYDROCHLORIDE 200 MCG: 10 INJECTION INTRAVENOUS at 07:45

## 2025-05-29 RX ADMIN — Medication 10 MG: at 09:39

## 2025-05-29 RX ADMIN — FENTANYL CITRATE 25 MCG: 50 INJECTION INTRAMUSCULAR; INTRAVENOUS at 08:40

## 2025-05-29 RX ADMIN — LIDOCAINE HYDROCHLORIDE 80 MG: 20 INJECTION, SOLUTION INFILTRATION; PERINEURAL at 07:38

## 2025-05-29 RX ADMIN — DEXAMETHASONE SODIUM PHOSPHATE 10 MG: 4 INJECTION, SOLUTION INTRA-ARTICULAR; INTRALESIONAL; INTRAMUSCULAR; INTRAVENOUS; SOFT TISSUE at 07:56

## 2025-05-29 RX ADMIN — IPRATROPIUM BROMIDE AND ALBUTEROL SULFATE 3 ML: .5; 3 SOLUTION RESPIRATORY (INHALATION) at 07:18

## 2025-05-29 RX ADMIN — PHENYLEPHRINE HYDROCHLORIDE 200 MCG: 10 INJECTION INTRAVENOUS at 09:04

## 2025-05-29 RX ADMIN — FENTANYL CITRATE 25 MCG: 50 INJECTION INTRAMUSCULAR; INTRAVENOUS at 09:36

## 2025-05-29 RX ADMIN — SODIUM CHLORIDE, SODIUM LACTATE, POTASSIUM CHLORIDE, AND CALCIUM CHLORIDE: .6; .31; .03; .02 INJECTION, SOLUTION INTRAVENOUS at 09:20

## 2025-05-29 RX ADMIN — SODIUM CHLORIDE, SODIUM LACTATE, POTASSIUM CHLORIDE, AND CALCIUM CHLORIDE: .6; .31; .03; .02 INJECTION, SOLUTION INTRAVENOUS at 07:29

## 2025-05-29 RX ADMIN — PHENYLEPHRINE HYDROCHLORIDE 100 MCG: 10 INJECTION INTRAVENOUS at 08:50

## 2025-05-29 RX ADMIN — DEXMEDETOMIDINE HYDROCHLORIDE 8 MCG: 100 INJECTION, SOLUTION INTRAVENOUS at 08:48

## 2025-05-29 RX ADMIN — FENTANYL CITRATE 25 MCG: 50 INJECTION INTRAMUSCULAR; INTRAVENOUS at 10:36

## 2025-05-29 RX ADMIN — DEXMEDETOMIDINE HYDROCHLORIDE 12 MCG: 100 INJECTION, SOLUTION INTRAVENOUS at 08:54

## 2025-05-29 RX ADMIN — PHENYLEPHRINE HYDROCHLORIDE 200 MCG: 10 INJECTION INTRAVENOUS at 08:19

## 2025-05-29 RX ADMIN — BUPIVACAINE HYDROCHLORIDE 15 ML: 2.5 INJECTION, SOLUTION EPIDURAL; INFILTRATION; INTRACAUDAL at 07:49

## 2025-05-29 RX ADMIN — Medication 20 MG: at 08:09

## 2025-05-29 RX ADMIN — BUPIVACAINE HYDROCHLORIDE 15 ML: 2.5 INJECTION, SOLUTION EPIDURAL; INFILTRATION; INTRACAUDAL at 07:42

## 2025-05-29 RX ADMIN — Medication 2 G: at 07:25

## 2025-05-29 RX ADMIN — Medication 10 MG: at 08:50

## 2025-05-29 RX ADMIN — FENTANYL CITRATE 50 MCG: 50 INJECTION INTRAMUSCULAR; INTRAVENOUS at 07:38

## 2025-05-29 RX ADMIN — FENTANYL CITRATE 25 MCG: 50 INJECTION INTRAMUSCULAR; INTRAVENOUS at 08:10

## 2025-05-29 RX ADMIN — FENTANYL CITRATE 50 MCG: 50 INJECTION INTRAMUSCULAR; INTRAVENOUS at 09:44

## 2025-05-29 RX ADMIN — ONDANSETRON 4 MG: 2 INJECTION INTRAMUSCULAR; INTRAVENOUS at 10:36

## 2025-05-29 RX ADMIN — PROPOFOL 160 MG: 10 INJECTION, EMULSION INTRAVENOUS at 07:38

## 2025-05-29 ASSESSMENT — ACTIVITIES OF DAILY LIVING (ADL)
ADLS_ACUITY_SCORE: 23

## 2025-05-29 NOTE — ANESTHESIA PROCEDURE NOTES
Airway       Patient location during procedure: OR       Procedure Start/Stop Times: 5/29/2025 7:39 AM  Staff -        CRNA: Anita Weber APRN CRNA       Other Anesthesia Staff: Carisa Narayan       Performed By: CRNA and SRNAIndications and Patient Condition       Indications for airway management: gay-procedural       Induction type:intravenous       Mask difficulty assessment: 0 - not attempted    Final Airway Details       Final airway type: supraglottic airway    Supraglottic Airway Details        Type: LMA       Brand: I-Gel       LMA size: 4    Post intubation assessment        Placement verified by: capnometry and equal breath sounds        Number of attempts at approach: 2       Ease of procedure: easy       Dentition: Intact and Unchanged    Medication(s) Administered   Medication Administration Time: 5/29/2025 7:39 AM

## 2025-05-29 NOTE — OR NURSING
Patient and responsible adult given discharge instructions with no questions regarding instructions. Deepika score 19/20. Pain level 1/10.  Discharged from unit via ambulation. Patient discharged to home with spouse. Patient up to void, 400ml ora urine.   Checked bladder scan post void and 33ml.    Patient without complaints.    Discharge home with spouse.,

## 2025-05-29 NOTE — ANESTHESIA POSTPROCEDURE EVALUATION
Patient: Maxx Canales    Procedure: Procedure(s):  BILATERAL OPEN INGUINAL HERNIA REPAIR WITH MESH       Anesthesia Type:  General    Note:  Disposition: Outpatient   Postop Pain Control: Uneventful            Sign Out: Well controlled pain   PONV: No   Neuro/Psych: Uneventful            Sign Out: Acceptable/Baseline neuro status   Airway/Respiratory: Uneventful            Sign Out: Acceptable/Baseline resp. status   CV/Hemodynamics: Uneventful            Sign Out: Acceptable CV status; No obvious hypovolemia; No obvious fluid overload   Other NRE: NONE   DID A NON-ROUTINE EVENT OCCUR? No       Last vitals:  Vitals Value Taken Time   /61 05/29/25 11:10   Temp 99.1  F (37.3  C) 05/29/25 11:10   Pulse 61 05/29/25 11:10   Resp 20 05/29/25 11:11   SpO2 95 % 05/29/25 11:11   Vitals shown include unfiled device data.    Electronically Signed By: SEPIDEH To CRNA  May 29, 2025  1:40 PM

## 2025-05-29 NOTE — ANESTHESIA PROCEDURE NOTES
TAP Procedure Note    Pre-Procedure   Staff -        CRNA: Anita Weber APRN CRNA       Other Anesthesia Staff: Carisa Narayan       Performed By: CRNA and CAMILLE       Location: OR       Procedure Start/Stop Times: 5/29/2025 7:42 AM and 5/29/2025 8:53 AM       Pre-Anesthestic Checklist: patient identified, IV checked, site marked, risks and benefits discussed, informed consent, monitors and equipment checked, pre-op evaluation, at physician/surgeon's request and post-op pain management  Timeout:       Correct Patient: Yes        Correct Procedure: Yes        Correct Site: Yes        Correct Position: Yes        Correct Laterality: Yes        Site Marked: Yes  Procedure Documentation  Procedure: TAP         Diagnosis: POST-OP PAIN MANAGEMENT       Laterality: bilateral       Patient Position: supine       Skin prep: Chloraprep       Needle Type: insulated and short bevel       Needle Gauge: 20.        Needle Length (Inches): 4        Ultrasound guided       1. Ultrasound was used to identify targeted nerve, plexus, vascular marker, or fascial plane and place a needle adjacent to it in real-time.       2. Ultrasound was used to visualize the spread of anesthetic in close proximity to the above referenced structure.       3. A permanent image is entered into the patient's record.       4. The visualized anatomic structures appeared normal.       5. There were no apparent abnormal pathologic findings.    Assessment/Narrative         The placement was negative for: blood aspirated, painful injection and site bleeding       Paresthesias: No.       Bolus given via needle. no blood aspirated via catheter.        Secured via.        Insertion/Infusion Method: Single Shot       Complications: none       Injection made incrementally with aspirations every 5 mL.    Medication(s) Administered   Bupivacaine 0.25% PF (Infiltration) - Infiltration   15 mL - 5/29/2025 7:42:00 AM   15 mL - 5/29/2025 7:49:00 AM  Bupivacaine liposome  "(Exparel) 1.3% LA inj susp (Infiltration) - Infiltration   10 mL - 5/29/2025 7:42:00 AM   10 mL - 5/29/2025 7:49:00 AM  Medication Administration Time: 5/29/2025 7:42 AM     Comments:  Added 10 ml of preservative free saline to bupivacaine 0.25% for each block      FOR [facility] ([facility] ONLY:   Pain Team Contact information: please page the Pain Team Via [employer]. Search \"Pain\". During daytime hours, please page the attending first. At night please page the resident first.      "

## 2025-05-29 NOTE — OP NOTE
REPORT OF OPERATION  DATE OF PROCEDURE: 5/29/2025    PATIENT: Maxx Canales    Operation: Procedure(s):  BILATERAL OPEN INGUINAL HERNIA REPAIR WITH MESH     PREOPERATIVE DIAGNOSIS: Bilateral Inguinal hernia    POSTOPERATIVE DIAGNOSIS: Same.  Right sided indirect inguinal hernia.  Left sided cord lipoma with direct hernia    SURGEON: Rene Andrade MD    ASSISTANTS: Whitley Noland.  Her assistance was required because of the technical aspects of the case.    ANESTHESIA: General    COMPLICATIONS: None    ESTIMATED BLOOD LOSS: 25 cc    TRANSFUSIONS: None    PATHOLOGY: None    FINDINGS: Right sided indirect inguinal hernia.  Left sided cord lipoma with direct hernia    INDICATIONS:  This is a 69 year old male presenting with asymptomatic bilateral inguinal hernias with CT scan demonstrating presence of small bowel within the right inguinal hernia.  This hernia was nonreducible in the clinic.   He was recommended for right sided inguinal hernia repair.  Given the presence of the left sided hernia, he was offered repair here as well.  Patient opted for this.  He was consented for bilateral inguinal hernia repair with mesh.    DESCRIPTIONS OF PROCEDURE IN DETAIL: After consent was obtained the patient was taken to the operative suite and dennise in the supine position.  The patient was identified and the correct patient was confirmed.  General endotracheal anesthesia was induced by anesthesia team.  The patient was sterilely prepped and draped in the usual fashion.  A time out was performed verifying the correct patient and the correct procedure.  The entire operative team was in agreement.  All necessary equipment and supplies were in the room.    We began on the right side.  A groin incision was made and dissection was carried down to the external oblique fascia.  This was opened sharply.  The inferior aspect of the external oblique fascia was dissected free of surrounding tissue bluntly.  The ilioinguinal nerve was  sacrificed during this step to prevent postoperative nerve pain.  The spermatic cord was encircled with a penrose drain.  We began dissecting the spermatic cord.  The was a large cord lipoma alongside the hernia sac.  The cord lipoma and hernia sac were dissected free from the spermatic cord.  The hernia sac was opened sharply and there were no bowel contents present.  The hernia sac was clamped, excised, and tied off with a 3 O vicryl suture.  The floor was intact.  The field was irrigated and hemostasis was excellent.  A macroporous, polypropylene mesh was sutured at the pubis, inferior shelving edge and internal oblique.  The internal inguinal ring was bolstered with a suture.  The external oblique was reapproximated with 3 O vicryl.  The subcutaneous tissue was closed with 3 O vicryl.  The skin was closed with 4 O monocryl and dermabond was placed over the incision.    We then began on the left side.  A groin incision was made and dissection was carried down to the external oblique fascia.  This was opened sharply.  The inferior aspect of the external oblique fascia was bluntly dissected away from surrounding tissue.  The spermatic cord was encircled with a penrose drain.  We began dissecting contents within the cord.  No hernia sac was found, but there were two large cord lipomas.  These were dissected free from the cord and reduced back into the abdomen.  There was a direct hernia defect.  This was repaired with O ethibond simple suture x2.  The field was irrigated and hemostasis was excellent.  A macroporous polypropylene mesh was sutured to the pubis, inferior shelving edge and internal oblique.  The external oblique fascia was reapproximated with 3 O vicryl.  The subcutaneous tissue was closed with 3 O vicryl.  The skin was closed with 4 O monocryl and dermabond was applied over the wound.  The patient tolerated the procedure well and was transferred to PACU in stable condition.

## 2025-05-29 NOTE — OR NURSING
Patient tolerating po intake well.  Denies pain at surgical site.  Is uncomfortable from sciatica pain, bed repositioned per pt request with relief.  Explained we need patient to void when he feels able and we are waiting for MD.  Spouse at bedside.  Call light within reach.

## 2025-05-29 NOTE — DISCHARGE INSTRUCTIONS
Thank you for allowing [facility] to care for you today.  If you have any questions and/or concerns please reach out to us Monday-Friday 0800-4:00 PM at [phone].  After hours please go to the Urgent Care and/or Emergency Room.  If you feel like it is an emergency call 911.

## 2025-05-29 NOTE — OR NURSING
PACU Respiratory Event Documentation     1) Episodes of Apnea greater than or equal to 10 seconds: 0    2) Bradypnea - less than 8 breaths per minute: 0    3) Pain score on 0 to 10 scale: 0    4) Pain-sedation mismatch (yes or no): no    5) Repeated 02 desaturation less than 90% (yes or no): no    Anesthesia notified? (yes or no): na    Any of the above events occuring repeatedly in separate 30 minute intervals may be considered recurrent PACU respiratory events.

## 2025-05-29 NOTE — ANESTHESIA CARE TRANSFER NOTE
Patient: Maxx Canales    Procedure: Procedure(s):  BILATERAL OPEN INGUINAL HERNIA REPAIR WITH MESH       Diagnosis: Bilateral inguinal hernia [K40.20]  Diagnosis Additional Information: No value filed.    Anesthesia Type:   General     Note:    Oropharynx: oral airway in place  Level of Consciousness: drowsy  Oxygen Supplementation: room air    Independent Airway: airway patency satisfactory and stable  Dentition: dentition unchanged  Vital Signs Stable: post-procedure vital signs reviewed and stable  Report to RN Given: handoff report given  Patient transferred to: PACU    Handoff Report: Identifed the Patient, Identified the Reponsible Provider, Reviewed the pertinent medical history, Discussed the surgical course, Reviewed Intra-OP anesthesia mangement and issues during anesthesia, Set expectations for post-procedure period and Allowed opportunity for questions and acknowledgement of understanding      Vitals:  Vitals Value Taken Time   BP     Temp     Pulse 73 05/29/25 10:49   Resp 20 05/29/25 10:49   SpO2 92 % 05/29/25 10:49   Vitals shown include unfiled device data.    Electronically Signed By: Carisa Narayan  May 29, 2025  10:49 AM

## 2025-05-29 NOTE — OR NURSING
MD in to see patient. Per MD patient needs to void prior to discharge.   Patient aware and asks for more apple juice.

## 2025-06-26 DIAGNOSIS — E78.00 PURE HYPERCHOLESTEROLEMIA: ICD-10-CM

## 2025-06-26 RX ORDER — SIMVASTATIN 80 MG
80 TABLET ORAL AT BEDTIME
Qty: 90 TABLET | Refills: 3 | Status: SHIPPED | OUTPATIENT
Start: 2025-06-26

## 2025-07-02 DIAGNOSIS — I10 BENIGN ESSENTIAL HYPERTENSION: Primary | ICD-10-CM

## 2025-07-03 RX ORDER — LISINOPRIL 40 MG/1
40 TABLET ORAL DAILY
Qty: 90 TABLET | Refills: 0 | Status: SHIPPED | OUTPATIENT
Start: 2025-07-03

## 2025-07-03 NOTE — TELEPHONE ENCOUNTER
Lisinopril      Last Written Prescription Date:  3.18.25  Last Fill Quantity: #90,   # refills: 0  Last Office Visit: 5.23.25  Future Office visit:    Next 5 appointments (look out 90 days)      Aug 21, 2025 3:00 PM  (Arrive by 2:45 PM)  Return Visit with Dawna Tello MD  [facility] ([facility] ) [address] [place] [id number]  [phone]             Routing refill request to provider for review/approval because:

## 2025-08-13 ENCOUNTER — LAB (OUTPATIENT)
Dept: LAB | Facility: OTHER | Age: 70
End: 2025-08-13
Attending: INTERNAL MEDICINE
Payer: MEDICARE

## 2025-08-13 ENCOUNTER — ONCOLOGY VISIT (OUTPATIENT)
Dept: ONCOLOGY | Facility: OTHER | Age: 70
End: 2025-08-13
Attending: INTERNAL MEDICINE
Payer: MEDICARE

## 2025-08-13 VITALS
WEIGHT: 184.97 LBS | HEIGHT: 66 IN | HEART RATE: 86 BPM | DIASTOLIC BLOOD PRESSURE: 64 MMHG | BODY MASS INDEX: 29.73 KG/M2 | SYSTOLIC BLOOD PRESSURE: 114 MMHG | OXYGEN SATURATION: 97 % | RESPIRATION RATE: 20 BRPM | TEMPERATURE: 97.7 F

## 2025-08-13 DIAGNOSIS — R79.89 ELEVATED FERRITIN: Primary | ICD-10-CM

## 2025-08-13 DIAGNOSIS — E83.10 DISORDER OF IRON METABOLISM, UNSPECIFIED: ICD-10-CM

## 2025-08-13 DIAGNOSIS — D56.1 BETA THALASSEMIA (H): ICD-10-CM

## 2025-08-13 DIAGNOSIS — E83.19 IRON EXCESS: ICD-10-CM

## 2025-08-13 LAB
BASOPHILS # BLD AUTO: 0.05 10E3/UL (ref 0–0.2)
BASOPHILS NFR BLD AUTO: 0.5 %
EOSINOPHIL # BLD AUTO: 0.13 10E3/UL (ref 0–0.7)
EOSINOPHIL NFR BLD AUTO: 1.3 %
ERYTHROCYTE [DISTWIDTH] IN BLOOD BY AUTOMATED COUNT: 17.2 % (ref 10–15)
FERRITIN SERPL-MCNC: 1060 NG/ML (ref 31–409)
HCT VFR BLD AUTO: 35.3 % (ref 40–53)
HGB BLD-MCNC: 11.6 G/DL (ref 13.3–17.7)
IMM GRANULOCYTES # BLD: 0.03 10E3/UL
IMM GRANULOCYTES NFR BLD: 0.3 %
IRON BINDING CAPACITY (ROCHE): 246 UG/DL (ref 240–430)
IRON SATN MFR SERPL: 18 % (ref 15–46)
IRON SERPL-MCNC: 45 UG/DL (ref 61–157)
LYMPHOCYTES # BLD AUTO: 2.98 10E3/UL (ref 0.8–5.3)
LYMPHOCYTES NFR BLD AUTO: 29.7 %
MCH RBC QN AUTO: 20.6 PG (ref 26.5–33)
MCHC RBC AUTO-ENTMCNC: 32.9 G/DL (ref 31.5–36.5)
MCV RBC AUTO: 62.7 FL (ref 78–100)
MONOCYTES # BLD AUTO: 0.94 10E3/UL (ref 0–1.3)
MONOCYTES NFR BLD AUTO: 9.4 %
NEUTROPHILS # BLD AUTO: 5.91 10E3/UL (ref 1.6–8.3)
NEUTROPHILS NFR BLD AUTO: 58.8 %
NRBC # BLD AUTO: <0.03 10E3/UL
NRBC BLD AUTO-RTO: 0 /100
PLATELET # BLD AUTO: 262 10E3/UL (ref 150–450)
RBC # BLD AUTO: 5.63 10E6/UL (ref 4.4–5.9)
WBC # BLD AUTO: 10.04 10E3/UL (ref 4–11)

## 2025-08-13 PROCEDURE — 83550 IRON BINDING TEST: CPT | Mod: ZL

## 2025-08-13 PROCEDURE — G0463 HOSPITAL OUTPT CLINIC VISIT: HCPCS

## 2025-08-13 PROCEDURE — 85025 COMPLETE CBC W/AUTO DIFF WBC: CPT | Mod: ZL

## 2025-08-13 PROCEDURE — 36415 COLL VENOUS BLD VENIPUNCTURE: CPT | Mod: ZL

## 2025-08-13 PROCEDURE — 82728 ASSAY OF FERRITIN: CPT | Mod: ZL

## 2025-08-13 ASSESSMENT — PAIN SCALES - GENERAL: PAINLEVEL_OUTOF10: MILD PAIN (3)

## (undated) DEVICE — SOL WATER 1500ML

## (undated) DEVICE — CATH URETERAL 5FRX70CM OPEN END FLEX TIP G14521

## (undated) DEVICE — CATH INTERMITTENT CLEAN-CATH 16FR 16" VINYL LF 421716

## (undated) DEVICE — SU ETHIBOND 0 MO-7 CR 8X18" CX41D

## (undated) DEVICE — SOL NACL 0.9% IRRIG 1000ML BOTTLE 2F7124

## (undated) DEVICE — TUOGHY BORST ADAPTER WITH SIDE ARM

## (undated) DEVICE — PAD CHUX UNDERPAD 30X36" P3036C

## (undated) DEVICE — PUMP SYSTEM SINGLE ACTION M0067201000

## (undated) DEVICE — BLADE CLIPPER DISP 4406

## (undated) DEVICE — COVER LT HANDLE 2/PK 5160-2FG

## (undated) DEVICE — FORCEP-COLON BIOPSY LARGE W/NEEDLE 240CM

## (undated) DEVICE — TUBING-SUCTION 20FT

## (undated) DEVICE — GUIDEWIRE AMPLATZ SUPER STIFF .038"X145CM M0066401061

## (undated) DEVICE — IRRIGATION-H2O 1000ML

## (undated) DEVICE — CANISTER-SUCTION 2000CC

## (undated) DEVICE — Device

## (undated) DEVICE — PACK CYSTO SBA15CSFCA

## (undated) DEVICE — PENROSE DRAIN 1/2 X 18 LATEX] 30416-050

## (undated) DEVICE — BASKET NITINOL TIPLESS HALO  1.5FRX120CM 554120

## (undated) DEVICE — GLOVE PROTEXIS POWDER FREE SMT 8.5 2D72PT85X

## (undated) DEVICE — SU VICRYL 3-0 SH 8X18" UND J864D

## (undated) DEVICE — CANISTER SUCTION MEDI-VAC GUARDIAN 2000ML 90D 65651-220

## (undated) DEVICE — SENSOR-OXISENSOR II ADULT

## (undated) DEVICE — GUIDEWIRE SENSOR DUAL FLEX STR 0.035"X150CM M0066703080

## (undated) DEVICE — SU VICRYL 3-0 SH 27" UND J416H

## (undated) DEVICE — LABEL STERILE PREPRINTED FOR OR FRRH01-2M

## (undated) DEVICE — GOWN SURG XL LVL 3 REINFORCED 9541

## (undated) DEVICE — SU MONOCRYL 4-0 PS-2 18" UND Y496G

## (undated) DEVICE — DRSG STERI STRIP 1/2X4" R1547

## (undated) DEVICE — CATH FOLEY COUDE TIEMAN 18FR 5ML LUBRICATH LATEX 0102L18

## (undated) DEVICE — SLEEVE SCD EXPRESS KNEE LENGTH MED 9529

## (undated) DEVICE — SLEEVE COMPRESSION SCD KNEE MED 74022

## (undated) DEVICE — PACK LAPAROTOMY CUSTOM SBA32LPMBG

## (undated) DEVICE — SUTURE-VICRYL 3-0 TIE J104T

## (undated) DEVICE — SHEATH URETERAL ACCESS NAVIGATOR HD 11/13FRX46CM M0062502230

## (undated) DEVICE — CONNECTOR-ERBEFLO 2 PORT

## (undated) DEVICE — TUBING SUCTION 20FT N620A

## (undated) DEVICE — BIN-COVIDIEN MESH BIN BN50

## (undated) DEVICE — ESU GROUND PAD ADULT W/CORD E7507

## (undated) DEVICE — PREP CHLORAPREP 26ML TINTED HI-LITE ORANGE 930815

## (undated) DEVICE — PACK BASIN SET UP SUTCNBSBBA

## (undated) RX ORDER — DEXAMETHASONE SODIUM PHOSPHATE 4 MG/ML
INJECTION, SOLUTION INTRA-ARTICULAR; INTRALESIONAL; INTRAMUSCULAR; INTRAVENOUS; SOFT TISSUE
Status: DISPENSED
Start: 2021-02-19

## (undated) RX ORDER — LIDOCAINE HYDROCHLORIDE 20 MG/ML
INJECTION, SOLUTION EPIDURAL; INFILTRATION; INTRACAUDAL; PERINEURAL
Status: DISPENSED
Start: 2018-10-04

## (undated) RX ORDER — PROPOFOL 10 MG/ML
INJECTION, EMULSION INTRAVENOUS
Status: DISPENSED
Start: 2018-10-04

## (undated) RX ORDER — DEXAMETHASONE SODIUM PHOSPHATE 10 MG/ML
INJECTION, SOLUTION INTRAMUSCULAR; INTRAVENOUS
Status: DISPENSED
Start: 2025-05-29

## (undated) RX ORDER — PROPOFOL 10 MG/ML
INJECTION, EMULSION INTRAVENOUS
Status: DISPENSED
Start: 2021-02-19

## (undated) RX ORDER — FENTANYL CITRATE 50 UG/ML
INJECTION, SOLUTION INTRAMUSCULAR; INTRAVENOUS
Status: DISPENSED
Start: 2025-05-29

## (undated) RX ORDER — CEFUROXIME AXETIL 250 MG/1
TABLET ORAL
Status: DISPENSED
Start: 2021-03-01

## (undated) RX ORDER — LIDOCAINE HYDROCHLORIDE 20 MG/ML
INJECTION, SOLUTION EPIDURAL; INFILTRATION; INTRACAUDAL; PERINEURAL
Status: DISPENSED
Start: 2021-02-19

## (undated) RX ORDER — LIDOCAINE HYDROCHLORIDE 20 MG/ML
INJECTION, SOLUTION EPIDURAL; INFILTRATION; INTRACAUDAL; PERINEURAL
Status: DISPENSED
Start: 2022-02-24

## (undated) RX ORDER — BUPIVACAINE HYDROCHLORIDE 2.5 MG/ML
INJECTION, SOLUTION EPIDURAL; INFILTRATION; INTRACAUDAL; PERINEURAL
Status: DISPENSED
Start: 2025-05-29

## (undated) RX ORDER — FENTANYL CITRATE 50 UG/ML
INJECTION, SOLUTION INTRAMUSCULAR; INTRAVENOUS
Status: DISPENSED
Start: 2021-02-09

## (undated) RX ORDER — CIPROFLOXACIN 2 MG/ML
INJECTION, SOLUTION INTRAVENOUS
Status: DISPENSED
Start: 2021-02-19

## (undated) RX ORDER — FENTANYL CITRATE 50 UG/ML
INJECTION, SOLUTION INTRAMUSCULAR; INTRAVENOUS
Status: DISPENSED
Start: 2021-02-19

## (undated) RX ORDER — PROPOFOL 10 MG/ML
INJECTION, EMULSION INTRAVENOUS
Status: DISPENSED
Start: 2021-02-09

## (undated) RX ORDER — LIDOCAINE HYDROCHLORIDE 20 MG/ML
INJECTION, SOLUTION EPIDURAL; INFILTRATION; INTRACAUDAL; PERINEURAL
Status: DISPENSED
Start: 2021-02-09

## (undated) RX ORDER — PROPOFOL 10 MG/ML
INJECTION, EMULSION INTRAVENOUS
Status: DISPENSED
Start: 2022-02-24

## (undated) RX ORDER — KETOROLAC TROMETHAMINE 30 MG/ML
INJECTION, SOLUTION INTRAMUSCULAR; INTRAVENOUS
Status: DISPENSED
Start: 2021-02-19

## (undated) RX ORDER — ONDANSETRON 2 MG/ML
INJECTION INTRAMUSCULAR; INTRAVENOUS
Status: DISPENSED
Start: 2021-02-19

## (undated) RX ORDER — ONDANSETRON 2 MG/ML
INJECTION INTRAMUSCULAR; INTRAVENOUS
Status: DISPENSED
Start: 2025-05-29

## (undated) RX ORDER — DEXMEDETOMIDINE HYDROCHLORIDE 4 UG/ML
INJECTION, SOLUTION INTRAVENOUS
Status: DISPENSED
Start: 2025-05-29

## (undated) RX ORDER — PROPOFOL 10 MG/ML
INJECTION, EMULSION INTRAVENOUS
Status: DISPENSED
Start: 2025-05-29

## (undated) RX ORDER — LIDOCAINE HYDROCHLORIDE 10 MG/ML
INJECTION, SOLUTION EPIDURAL; INFILTRATION; INTRACAUDAL; PERINEURAL
Status: DISPENSED
Start: 2021-02-19